# Patient Record
Sex: FEMALE | Race: WHITE | NOT HISPANIC OR LATINO | Employment: OTHER | URBAN - NONMETROPOLITAN AREA
[De-identification: names, ages, dates, MRNs, and addresses within clinical notes are randomized per-mention and may not be internally consistent; named-entity substitution may affect disease eponyms.]

---

## 2017-12-07 ENCOUNTER — APPOINTMENT (OUTPATIENT)
Dept: GENERAL RADIOLOGY | Facility: HOSPITAL | Age: 57
End: 2017-12-07

## 2017-12-07 ENCOUNTER — HOSPITAL ENCOUNTER (EMERGENCY)
Facility: HOSPITAL | Age: 57
Discharge: HOME | End: 2017-12-07
Attending: EMERGENCY MEDICINE | Admitting: EMERGENCY MEDICINE

## 2017-12-07 VITALS
WEIGHT: 150 LBS | SYSTOLIC BLOOD PRESSURE: 129 MMHG | TEMPERATURE: 97.8 F | DIASTOLIC BLOOD PRESSURE: 80 MMHG | OXYGEN SATURATION: 100 % | RESPIRATION RATE: 18 BRPM | HEART RATE: 91 BPM | HEIGHT: 65 IN | BODY MASS INDEX: 24.99 KG/M2

## 2017-12-07 DIAGNOSIS — S93.402A SPRAIN OF LEFT ANKLE, UNSPECIFIED LIGAMENT, INITIAL ENCOUNTER: Primary | ICD-10-CM

## 2017-12-07 DIAGNOSIS — S46.911A STRAIN OF RIGHT UPPER ARM, INITIAL ENCOUNTER: ICD-10-CM

## 2017-12-07 PROCEDURE — 73060 X-RAY EXAM OF HUMERUS: CPT

## 2017-12-07 PROCEDURE — 73060 X-RAY EXAM OF HUMERUS: CPT | Performed by: RADIOLOGY

## 2017-12-07 PROCEDURE — 99283 EMERGENCY DEPT VISIT LOW MDM: CPT

## 2017-12-07 PROCEDURE — 25010000002 KETOROLAC TROMETHAMINE PER 15 MG: Performed by: EMERGENCY MEDICINE

## 2017-12-07 PROCEDURE — 73090 X-RAY EXAM OF FOREARM: CPT

## 2017-12-07 PROCEDURE — 73090 X-RAY EXAM OF FOREARM: CPT | Performed by: RADIOLOGY

## 2017-12-07 PROCEDURE — 96372 THER/PROPH/DIAG INJ SC/IM: CPT

## 2017-12-07 PROCEDURE — 73610 X-RAY EXAM OF ANKLE: CPT | Performed by: RADIOLOGY

## 2017-12-07 PROCEDURE — 73610 X-RAY EXAM OF ANKLE: CPT

## 2017-12-07 RX ORDER — HYDROCODONE BITARTRATE AND ACETAMINOPHEN 5; 325 MG/1; MG/1
1 TABLET ORAL ONCE
Status: COMPLETED | OUTPATIENT
Start: 2017-12-07 | End: 2017-12-07

## 2017-12-07 RX ORDER — KETOROLAC TROMETHAMINE 30 MG/ML
30 INJECTION, SOLUTION INTRAMUSCULAR; INTRAVENOUS ONCE
Status: COMPLETED | OUTPATIENT
Start: 2017-12-07 | End: 2017-12-07

## 2017-12-07 RX ADMIN — HYDROCODONE BITARTRATE AND ACETAMINOPHEN 1 TABLET: 5; 325 TABLET ORAL at 22:52

## 2017-12-07 RX ADMIN — KETOROLAC TROMETHAMINE 30 MG: 30 INJECTION, SOLUTION INTRAMUSCULAR at 22:56

## 2017-12-08 NOTE — ED PROVIDER NOTES
Subjective   Patient is a 57 y.o. female presenting with lower extremity pain and upper extremity pain.   History provided by:  Patient   used: No    Lower Extremity Issue   Location:  Ankle  Ankle location:  L ankle  Associated symptoms: no fever    Upper Extremity Issue   Location:  Elbow  Elbow location:  R elbow  Injury: yes    Mechanism of injury: fall    Fall:     Fall occurred:  Walking (stepped off a sidewalk)    Impact surface:  [place]    Point of impact:  Unable to specify    Entrapped after fall: no    Pain details:     Quality:  Throbbing    Radiates to:  Does not radiate    Severity:  Moderate    Duration:  1 hour    Timing:  Constant    Progression:  Unchanged  Handedness:  Right-handed  Dislocation: no    Foreign body present:  No foreign bodies  Prior injury to area:  No  Relieved by:  Nothing  Worsened by:  Bearing weight and movement  Ineffective treatments:  None tried  Associated symptoms: decreased range of motion and swelling    Associated symptoms: no fever    Risk factors: no concern for non-accidental trauma and no frequent fractures        Review of Systems   Constitutional: Negative.  Negative for fever.   HENT: Negative.    Respiratory: Negative.    Cardiovascular: Negative.  Negative for chest pain.   Gastrointestinal: Negative.  Negative for abdominal pain.   Endocrine: Negative.    Genitourinary: Negative.  Negative for dysuria.   Musculoskeletal:        (+) left ankle pain, right elbow pain   Skin: Negative.    Neurological: Negative.    Psychiatric/Behavioral: Negative.    All other systems reviewed and are negative.      History reviewed. No pertinent past medical history.    No Known Allergies    Past Surgical History:   Procedure Laterality Date   • BLADDER REPAIR     • CHOLECYSTECTOMY     • GASTRIC BYPASS     • HYSTERECTOMY     • LASIK     • LUMBAR DISC SURGERY      L5 fusion       History reviewed. No pertinent family history.    Social History     Social  History   • Marital status:      Spouse name: N/A   • Number of children: N/A   • Years of education: N/A     Social History Main Topics   • Smoking status: Never Smoker   • Smokeless tobacco: Never Used   • Alcohol use No   • Drug use: No   • Sexual activity: Defer     Other Topics Concern   • None     Social History Narrative   • None           Objective   Physical Exam   Constitutional: She is oriented to person, place, and time. She appears well-developed and well-nourished. No distress.   HENT:   Head: Normocephalic and atraumatic.   Right Ear: External ear normal.   Left Ear: External ear normal.   Nose: Nose normal.   Eyes: Conjunctivae and EOM are normal. Pupils are equal, round, and reactive to light.   Neck: Normal range of motion. Neck supple. No JVD present. No tracheal deviation present.   Cardiovascular: Normal rate, regular rhythm and normal heart sounds.    No murmur heard.  Pulmonary/Chest: Effort normal and breath sounds normal. No respiratory distress. She has no wheezes.   Abdominal: Soft. Bowel sounds are normal. There is no tenderness.   Musculoskeletal: She exhibits no edema or deformity.        Right elbow: She exhibits decreased range of motion and swelling. Tenderness found.        Left ankle: She exhibits decreased range of motion and swelling. Tenderness. Lateral malleolus tenderness found.   Neurological: She is alert and oriented to person, place, and time. No cranial nerve deficit.   Skin: Skin is warm and dry. No rash noted. She is not diaphoretic. No erythema. No pallor.   Psychiatric: She has a normal mood and affect. Her behavior is normal. Thought content normal.   Nursing note and vitals reviewed.      Splint - Cast - Strapping  Date/Time: 12/7/2017 10:55 PM  Performed by: KALLIE SILVA  Authorized by: JUAN TOM     Consent:     Consent obtained:  Verbal    Consent given by:  Patient    Risks discussed:  Pain    Alternatives discussed:  Delayed treatment,  observation and referral  Universal protocol:     Procedure explained and questions answered to patient or proxy's satisfaction: yes      Relevant documents present and verified: yes      Test results available and properly labeled: yes      Imaging studies available: yes      Required blood products, implants, devices, and special equipment available: yes      Site/side marked: yes      Immediately prior to procedure a time out was called: yes      Patient identity confirmed:  Verbally with patient, arm band, provided demographic data and hospital-assigned identification number  Pre-procedure details:     Sensation:  Normal    Skin color:  Pink  Procedure details:     Laterality:  Right    Location:  Elbow    Elbow:  R elbow    Strapping: no      Supplies:  Elastic bandage  Post-procedure details:     Pain:  Unchanged    Sensation:  Normal    Skin color:  Pink    Patient tolerance of procedure:  Tolerated well, no immediate complications  Comments:      Tech applied wrap in my presence. Pt tolerated procedure well. No acute complications. Neurovascularly intact.  Splint - Cast - Strapping  Date/Time: 12/7/2017 10:59 PM  Performed by: KALLIE SILVA  Authorized by: JUAN TOM     Consent:     Consent obtained:  Verbal    Consent given by:  Patient    Risks discussed:  Pain and swelling    Alternatives discussed:  No treatment, alternative treatment and referral  Universal protocol:     Procedure explained and questions answered to patient or proxy's satisfaction: yes      Relevant documents present and verified: yes      Test results available and properly labeled: yes      Imaging studies available: yes      Required blood products, implants, devices, and special equipment available: yes      Site/side marked: yes      Immediately prior to procedure a time out was called: yes      Patient identity confirmed:  Verbally with patient, arm band, provided demographic data and hospital-assigned  identification number  Pre-procedure details:     Sensation:  Normal    Skin color:  Pink  Procedure details:     Laterality:  Left    Location:  Ankle    Ankle:  L ankle    Strapping: no      Splint type: walking boot.  Post-procedure details:     Pain:  Unchanged    Sensation:  Normal    Skin color:  Pink    Patient tolerance of procedure:  Tolerated well, no immediate complications  Comments:      Tech applied walking boot in my presence. No acute complications. Tolerated application well. Neurovascularly intact.             ED Course  ED Course                  MDM  Number of Diagnoses or Management Options  new and requires workup  new and requires workup     Amount and/or Complexity of Data Reviewed  Tests in the radiology section of CPT®: ordered and reviewed  Discuss the patient with other providers: yes    Risk of Complications, Morbidity, and/or Mortality  Presenting problems: moderate  Diagnostic procedures: moderate  Management options: moderate    Patient Progress  Patient progress: stable      Final diagnoses:   Sprain of left ankle, unspecified ligament, initial encounter   Strain of right upper arm, initial encounter            Federico Gramajo PA-C  12/08/17 0239

## 2017-12-08 NOTE — ED NOTES
Pt states that she was walking down a sidewalk when she stepped off of it by accident, states that she fell and twisted her left ankle, hurt her right elbow/forearm, states that she also hit the right side of her face. Pt denies loss of consciousness. Pt has swelling and tenderness noted to left ankle. No swelling noted to arm, pt is guarding right arm. Pt has no bruising or swelling noted to face     Karin Sutherland RN  12/07/17 8001

## 2017-12-08 NOTE — ED NOTES
Pt has ace wrap in place to right arm, states her arm feels better now. Pt has walking boot in place to left foot, pt states her pain is starting to improve. Neurovascular status remains in tact. NAD noted     Karin Sutherland RN  12/08/17 [id number]

## 2019-03-04 ENCOUNTER — HOSPITAL ENCOUNTER (EMERGENCY)
Facility: HOSPITAL | Age: 59
Discharge: HOME | End: 2019-03-04
Attending: EMERGENCY MEDICINE | Admitting: EMERGENCY MEDICINE

## 2019-03-04 ENCOUNTER — APPOINTMENT (OUTPATIENT)
Dept: GENERAL RADIOLOGY | Facility: HOSPITAL | Age: 59
End: 2019-03-04

## 2019-03-04 VITALS
RESPIRATION RATE: 20 BRPM | TEMPERATURE: 97 F | DIASTOLIC BLOOD PRESSURE: 75 MMHG | WEIGHT: 160 LBS | HEART RATE: 87 BPM | OXYGEN SATURATION: 100 % | HEIGHT: 65 IN | SYSTOLIC BLOOD PRESSURE: 156 MMHG | BODY MASS INDEX: 26.66 KG/M2

## 2019-03-04 DIAGNOSIS — S42.001A CLOSED NONDISPLACED FRACTURE OF RIGHT CLAVICLE, UNSPECIFIED PART OF CLAVICLE, INITIAL ENCOUNTER: Primary | ICD-10-CM

## 2019-03-04 PROCEDURE — 73060 X-RAY EXAM OF HUMERUS: CPT | Performed by: RADIOLOGY

## 2019-03-04 PROCEDURE — 73060 X-RAY EXAM OF HUMERUS: CPT

## 2019-03-04 PROCEDURE — 73000 X-RAY EXAM OF COLLAR BONE: CPT | Performed by: RADIOLOGY

## 2019-03-04 PROCEDURE — 73030 X-RAY EXAM OF SHOULDER: CPT | Performed by: RADIOLOGY

## 2019-03-04 PROCEDURE — 73030 X-RAY EXAM OF SHOULDER: CPT

## 2019-03-04 PROCEDURE — 99283 EMERGENCY DEPT VISIT LOW MDM: CPT

## 2019-03-04 PROCEDURE — 73000 X-RAY EXAM OF COLLAR BONE: CPT

## 2019-03-04 RX ORDER — HYDROCODONE BITARTRATE AND ACETAMINOPHEN 5; 325 MG/1; MG/1
1 TABLET ORAL EVERY 4 HOURS PRN
Qty: 12 TABLET | Refills: 0 | Status: ON HOLD | OUTPATIENT
Start: 2019-03-04 | End: 2020-08-30

## 2019-03-04 NOTE — ED NOTES
Orange fall bracelet placed on pt and education given, pt verb. understanding     Estephanie Kirkpatrick, TONG  03/04/19 8192

## 2019-03-04 NOTE — ED PROVIDER NOTES
Subjective   This is a 58-year-old female who comes in with chief complaint fall times 1 day ago.  Patient states that she tripped falling on bedroom floor injuring her right shoulder. Patient states that she had a mix drink, got up to use the bathroom when fell.  Patient denies any head injury loss conscious.  Patient only complaint is right shoulder and clavicle pain.         History provided by:  Patient   used: No    Fall   Mechanism of injury: fall    Time since incident:  1 day  Arrived directly from scene: no    Fall:     Fall occurred:  Tripped and walking    Impact surface:  Hard floor    Entrapped after fall: no    Suspicion of alcohol use: no    Suspicion of drug use: no    Tetanus status:  Unknown  Prior to arrival data:     Bystander interventions:  None    Patient ambulatory at scene: no      Blood loss:  None    Orientation at scene:  Person, place, time and situation    Loss of consciousness: no      Amnesic to event: no      Airway interventions:  None    Breathing interventions:  None    IV access status:  None    IO access:  None    Fluids administered:  None    Cardiac interventions:  None    Medications administered:  None    Immobilization:  None  Associated symptoms: no abdominal pain, no back pain, no difficulty breathing, no headaches, no hearing loss, no nausea and no neck pain    Risk factors: no AICD, no beta blocker therapy, no CABG, no COPD, no dialysis, no kidney disease, no pacemaker and not pregnant        Review of Systems   Constitutional: Negative.    HENT: Negative for hearing loss.    Eyes: Negative.    Gastrointestinal: Negative for abdominal pain and nausea.   Musculoskeletal: Positive for arthralgias, joint swelling and myalgias. Negative for back pain and neck pain.   Skin: Negative.  Negative for color change, pallor and wound.   Neurological: Negative for headaches.   All other systems reviewed and are negative.      No past medical history on  file.    No Known Allergies    Past Surgical History:   Procedure Laterality Date   • BLADDER REPAIR     • CHOLECYSTECTOMY     • GASTRIC BYPASS     • HYSTERECTOMY     • LASIK     • LUMBAR DISC SURGERY      L5 fusion       No family history on file.    Social History     Socioeconomic History   • Marital status:      Spouse name: Not on file   • Number of children: Not on file   • Years of education: Not on file   • Highest education level: Not on file   Tobacco Use   • Smoking status: Never Smoker   • Smokeless tobacco: Never Used   Substance and Sexual Activity   • Alcohol use: No   • Drug use: No   • Sexual activity: Defer           Objective   Physical Exam   Constitutional: She is oriented to person, place, and time. She appears well-developed and well-nourished. No distress.   HENT:   Head: Normocephalic.   Right Ear: External ear normal.   Left Ear: External ear normal.   Nose: Nose normal.   Mouth/Throat: Oropharynx is clear and moist. No oropharyngeal exudate.   Eyes: Conjunctivae and EOM are normal. Pupils are equal, round, and reactive to light. Right eye exhibits no discharge. Left eye exhibits no discharge. No scleral icterus.   Neck: Normal range of motion. Neck supple. No JVD present. No tracheal deviation present. No thyromegaly present.   Cardiovascular: Normal rate, regular rhythm, normal heart sounds and intact distal pulses. Exam reveals no gallop and no friction rub.   No murmur heard.  Pulmonary/Chest: Effort normal and breath sounds normal. No stridor. No respiratory distress. She has no wheezes. She has no rales.   Abdominal: Soft. Bowel sounds are normal. She exhibits no distension and no mass. There is no tenderness. There is no rebound and no guarding. No hernia.   Musculoskeletal: She exhibits edema and tenderness.        Right shoulder: She exhibits decreased range of motion, tenderness, swelling, pain and spasm.   Neurological: She is alert and oriented to person, place, and time.  She displays normal reflexes. No cranial nerve deficit. She exhibits normal muscle tone. Coordination normal.   Skin: Skin is warm and dry. Capillary refill takes less than 2 seconds. No rash noted. She is not diaphoretic. No erythema. No pallor.   Psychiatric: She has a normal mood and affect. Her behavior is normal. Judgment and thought content normal.   Nursing note and vitals reviewed.      Splint - Cast - Strapping  Date/Time: 3/4/2019 1:59 PM  Performed by: Preston Escoto PA-C  Authorized by: Khurram Bauer MD     Consent:     Consent obtained:  Verbal    Consent given by:  Patient    Risks discussed:  Discoloration    Alternatives discussed:  No treatment  Pre-procedure details:     Sensation:  Normal    Skin color:  Pink   Procedure details:     Laterality:  Right    Location:  Shoulder    Shoulder:  R shoulder    Strapping: no      Cast type:  Long arm    Supplies:  Prefabricated splint and sling  Post-procedure details:     Pain:  Improved    Sensation:  Normal    Skin color:  Pink     Patient tolerance of procedure:  Tolerated well, no immediate complications  Comments:      N/V intact                ED Course  ED Course as of Mar 04 1402   Mon Mar 04, 2019   1348 Distal right clavicle fracture.  []   1402 Ronaldo obtained   []      ED Course User Index  [] Preston Escoto PA-C                  [facility]      Final diagnoses:   Closed nondisplaced fracture of right clavicle, unspecified part of clavicle, initial encounter            Preston Escoto PA-C  03/04/19 1402

## 2020-08-30 ENCOUNTER — APPOINTMENT (OUTPATIENT)
Dept: CT IMAGING | Facility: HOSPITAL | Age: 60
End: 2020-08-30

## 2020-08-30 ENCOUNTER — APPOINTMENT (OUTPATIENT)
Dept: GENERAL RADIOLOGY | Facility: HOSPITAL | Age: 60
End: 2020-08-30

## 2020-08-30 ENCOUNTER — ANESTHESIA EVENT (OUTPATIENT)
Dept: PERIOP | Facility: HOSPITAL | Age: 60
End: 2020-08-30

## 2020-08-30 ENCOUNTER — HOSPITAL ENCOUNTER (INPATIENT)
Facility: HOSPITAL | Age: 60
LOS: 1 days | Discharge: HOME | End: 2020-08-31
Attending: FAMILY MEDICINE | Admitting: INTERNAL MEDICINE

## 2020-08-30 ENCOUNTER — ANESTHESIA (OUTPATIENT)
Dept: PERIOP | Facility: HOSPITAL | Age: 60
End: 2020-08-30

## 2020-08-30 DIAGNOSIS — N30.00 ACUTE CYSTITIS WITHOUT HEMATURIA: ICD-10-CM

## 2020-08-30 DIAGNOSIS — S42.211A CLOSED DISPLACED FRACTURE OF SURGICAL NECK OF RIGHT HUMERUS, UNSPECIFIED FRACTURE MORPHOLOGY, INITIAL ENCOUNTER: Primary | ICD-10-CM

## 2020-08-30 LAB
6-ACETYL MORPHINE: NEGATIVE
ABO GROUP BLD: NORMAL
ALBUMIN SERPL-MCNC: 4.17 G/DL (ref 3.5–5.2)
ALBUMIN/GLOB SERPL: 1.4 G/DL
ALP SERPL-CCNC: 87 U/L (ref 39–117)
ALT SERPL W P-5'-P-CCNC: 20 U/L (ref 1–33)
AMPHET+METHAMPHET UR QL: NEGATIVE
ANION GAP SERPL CALCULATED.3IONS-SCNC: 16.6 MMOL/L (ref 5–15)
APTT PPP: 26 SECONDS (ref 25.6–35.3)
AST SERPL-CCNC: 29 U/L (ref 1–32)
BACTERIA UR QL AUTO: ABNORMAL /HPF
BARBITURATES UR QL SCN: NEGATIVE
BASOPHILS # BLD AUTO: 0.04 10*3/MM3 (ref 0–0.2)
BASOPHILS NFR BLD AUTO: 0.4 % (ref 0–1.5)
BENZODIAZ UR QL SCN: NEGATIVE
BILIRUB SERPL-MCNC: 0.2 MG/DL (ref 0–1.2)
BILIRUB UR QL STRIP: NEGATIVE
BLD GP AB SCN SERPL QL: NEGATIVE
BUN SERPL-MCNC: 8 MG/DL (ref 6–20)
BUN/CREAT SERPL: 11.8 (ref 7–25)
BUPRENORPHINE SERPL-MCNC: NEGATIVE NG/ML
CALCIUM SPEC-SCNC: 8.9 MG/DL (ref 8.6–10.5)
CANNABINOIDS SERPL QL: NEGATIVE
CHLORIDE SERPL-SCNC: 102 MMOL/L (ref 98–107)
CLARITY UR: CLEAR
CO2 SERPL-SCNC: 19.4 MMOL/L (ref 22–29)
COCAINE UR QL: NEGATIVE
COLOR UR: YELLOW
CREAT SERPL-MCNC: 0.68 MG/DL (ref 0.57–1)
DEPRECATED RDW RBC AUTO: 53.4 FL (ref 37–54)
EOSINOPHIL # BLD AUTO: 0.02 10*3/MM3 (ref 0–0.4)
EOSINOPHIL NFR BLD AUTO: 0.2 % (ref 0.3–6.2)
ERYTHROCYTE [DISTWIDTH] IN BLOOD BY AUTOMATED COUNT: 18.4 % (ref 12.3–15.4)
ETHANOL BLD-MCNC: 194 MG/DL (ref 0–10)
ETHANOL UR QL: 0.19 %
GFR SERPL CREATININE-BSD FRML MDRD: 89 ML/MIN/1.73
GLOBULIN UR ELPH-MCNC: 3 GM/DL
GLUCOSE SERPL-MCNC: 103 MG/DL (ref 65–99)
GLUCOSE UR STRIP-MCNC: NEGATIVE MG/DL
HCT VFR BLD AUTO: 36.5 % (ref 34–46.6)
HGB BLD-MCNC: 11 G/DL (ref 12–15.9)
HGB UR QL STRIP.AUTO: NEGATIVE
HYALINE CASTS UR QL AUTO: ABNORMAL /LPF
IMM GRANULOCYTES # BLD AUTO: 0.05 10*3/MM3 (ref 0–0.05)
IMM GRANULOCYTES NFR BLD AUTO: 0.5 % (ref 0–0.5)
INR PPP: 0.95 (ref 0.9–1.1)
KETONES UR QL STRIP: NEGATIVE
LEUKOCYTE ESTERASE UR QL STRIP.AUTO: ABNORMAL
LYMPHOCYTES # BLD AUTO: 1.21 10*3/MM3 (ref 0.7–3.1)
LYMPHOCYTES NFR BLD AUTO: 11.9 % (ref 19.6–45.3)
MAGNESIUM SERPL-MCNC: 1.9 MG/DL (ref 1.6–2.6)
MCH RBC QN AUTO: 24.4 PG (ref 26.6–33)
MCHC RBC AUTO-ENTMCNC: 30.1 G/DL (ref 31.5–35.7)
MCV RBC AUTO: 80.9 FL (ref 79–97)
METHADONE UR QL SCN: NEGATIVE
MONOCYTES # BLD AUTO: 0.45 10*3/MM3 (ref 0.1–0.9)
MONOCYTES NFR BLD AUTO: 4.4 % (ref 5–12)
NEUTROPHILS NFR BLD AUTO: 8.36 10*3/MM3 (ref 1.7–7)
NEUTROPHILS NFR BLD AUTO: 82.6 % (ref 42.7–76)
NITRITE UR QL STRIP: NEGATIVE
NRBC BLD AUTO-RTO: 0 /100 WBC (ref 0–0.2)
OPIATES UR QL: NEGATIVE
OXYCODONE UR QL SCN: NEGATIVE
PCP UR QL SCN: NEGATIVE
PH UR STRIP.AUTO: 6 [PH] (ref 5–8)
PLATELET # BLD AUTO: 333 10*3/MM3 (ref 140–450)
PMV BLD AUTO: 9.1 FL (ref 6–12)
POTASSIUM SERPL-SCNC: 3.2 MMOL/L (ref 3.5–5.2)
PROT SERPL-MCNC: 7.2 G/DL (ref 6–8.5)
PROT UR QL STRIP: NEGATIVE
PROTHROMBIN TIME: 12.5 SECONDS (ref 11.9–14.1)
RBC # BLD AUTO: 4.51 10*6/MM3 (ref 3.77–5.28)
RBC # UR: ABNORMAL /HPF
REF LAB TEST METHOD: ABNORMAL
RH BLD: POSITIVE
SARS-COV-2 RDRP RESP QL NAA+PROBE: NOT DETECTED
SODIUM SERPL-SCNC: 138 MMOL/L (ref 136–145)
SP GR UR STRIP: <=1.005 (ref 1–1.03)
SQUAMOUS #/AREA URNS HPF: ABNORMAL /HPF
T&S EXPIRATION DATE: NORMAL
UROBILINOGEN UR QL STRIP: ABNORMAL
WBC # BLD AUTO: 10.13 10*3/MM3 (ref 3.4–10.8)
WBC UR QL AUTO: ABNORMAL /HPF

## 2020-08-30 PROCEDURE — 80307 DRUG TEST PRSMV CHEM ANLYZR: CPT | Performed by: PHYSICIAN ASSISTANT

## 2020-08-30 PROCEDURE — 85610 PROTHROMBIN TIME: CPT | Performed by: INTERNAL MEDICINE

## 2020-08-30 PROCEDURE — 25010000002 ROPIVACAINE PER 1 MG: Performed by: ANESTHESIOLOGY

## 2020-08-30 PROCEDURE — 94799 UNLISTED PULMONARY SVC/PX: CPT

## 2020-08-30 PROCEDURE — 81001 URINALYSIS AUTO W/SCOPE: CPT | Performed by: PHYSICIAN ASSISTANT

## 2020-08-30 PROCEDURE — 86901 BLOOD TYPING SEROLOGIC RH(D): CPT | Performed by: PHYSICIAN ASSISTANT

## 2020-08-30 PROCEDURE — 25010000002 CEFTRIAXONE: Performed by: PHYSICIAN ASSISTANT

## 2020-08-30 PROCEDURE — 25010000002 MIDAZOLAM PER 1 MG: Performed by: ANESTHESIOLOGY

## 2020-08-30 PROCEDURE — 86850 RBC ANTIBODY SCREEN: CPT | Performed by: PHYSICIAN ASSISTANT

## 2020-08-30 PROCEDURE — 87086 URINE CULTURE/COLONY COUNT: CPT | Performed by: PHYSICIAN ASSISTANT

## 2020-08-30 PROCEDURE — 73030 X-RAY EXAM OF SHOULDER: CPT

## 2020-08-30 PROCEDURE — 93005 ELECTROCARDIOGRAM TRACING: CPT | Performed by: PHYSICIAN ASSISTANT

## 2020-08-30 PROCEDURE — C1713 ANCHOR/SCREW BN/BN,TIS/BN: HCPCS | Performed by: ORTHOPAEDIC SURGERY

## 2020-08-30 PROCEDURE — 76000 FLUOROSCOPY <1 HR PHYS/QHP: CPT

## 2020-08-30 PROCEDURE — 25010000002 MORPHINE PER 10 MG: Performed by: FAMILY MEDICINE

## 2020-08-30 PROCEDURE — 25010000002 ONDANSETRON PER 1 MG: Performed by: NURSE ANESTHETIST, CERTIFIED REGISTERED

## 2020-08-30 PROCEDURE — 85730 THROMBOPLASTIN TIME PARTIAL: CPT | Performed by: INTERNAL MEDICINE

## 2020-08-30 PROCEDURE — 87186 SC STD MICRODIL/AGAR DIL: CPT | Performed by: PHYSICIAN ASSISTANT

## 2020-08-30 PROCEDURE — 0PSF04Z REPOSITION RIGHT HUMERAL SHAFT WITH INTERNAL FIXATION DEVICE, OPEN APPROACH: ICD-10-PCS | Performed by: ORTHOPAEDIC SURGERY

## 2020-08-30 PROCEDURE — 99223 1ST HOSP IP/OBS HIGH 75: CPT | Performed by: PHYSICIAN ASSISTANT

## 2020-08-30 PROCEDURE — 25010000002 BUPRENORPHINE PER 0.1 MG: Performed by: ANESTHESIOLOGY

## 2020-08-30 PROCEDURE — 83735 ASSAY OF MAGNESIUM: CPT | Performed by: PHYSICIAN ASSISTANT

## 2020-08-30 PROCEDURE — 25010000002 PROPOFOL 10 MG/ML EMULSION: Performed by: NURSE ANESTHETIST, CERTIFIED REGISTERED

## 2020-08-30 PROCEDURE — 25010000002 MORPHINE PER 10 MG: Performed by: ORTHOPAEDIC SURGERY

## 2020-08-30 PROCEDURE — 25010000002 FENTANYL CITRATE (PF) 100 MCG/2ML SOLUTION: Performed by: ANESTHESIOLOGY

## 2020-08-30 PROCEDURE — 76000 FLUOROSCOPY <1 HR PHYS/QHP: CPT | Performed by: RADIOLOGY

## 2020-08-30 PROCEDURE — 99284 EMERGENCY DEPT VISIT MOD MDM: CPT

## 2020-08-30 PROCEDURE — 86900 BLOOD TYPING SEROLOGIC ABO: CPT

## 2020-08-30 PROCEDURE — 87635 SARS-COV-2 COVID-19 AMP PRB: CPT | Performed by: NURSE PRACTITIONER

## 2020-08-30 PROCEDURE — 85025 COMPLETE CBC W/AUTO DIFF WBC: CPT | Performed by: PHYSICIAN ASSISTANT

## 2020-08-30 PROCEDURE — 70486 CT MAXILLOFACIAL W/O DYE: CPT

## 2020-08-30 PROCEDURE — 25010000002 MORPHINE PER 10 MG: Performed by: INTERNAL MEDICINE

## 2020-08-30 PROCEDURE — 71045 X-RAY EXAM CHEST 1 VIEW: CPT

## 2020-08-30 PROCEDURE — 80053 COMPREHEN METABOLIC PANEL: CPT | Performed by: PHYSICIAN ASSISTANT

## 2020-08-30 PROCEDURE — 73200 CT UPPER EXTREMITY W/O DYE: CPT

## 2020-08-30 PROCEDURE — 25010000002 ONDANSETRON PER 1 MG: Performed by: PHYSICIAN ASSISTANT

## 2020-08-30 PROCEDURE — 87077 CULTURE AEROBIC IDENTIFY: CPT | Performed by: PHYSICIAN ASSISTANT

## 2020-08-30 PROCEDURE — 93010 ELECTROCARDIOGRAM REPORT: CPT | Performed by: INTERNAL MEDICINE

## 2020-08-30 PROCEDURE — 73000 X-RAY EXAM OF COLLAR BONE: CPT

## 2020-08-30 PROCEDURE — 25010000002 DEXAMETHASONE PER 1 MG: Performed by: NURSE ANESTHETIST, CERTIFIED REGISTERED

## 2020-08-30 PROCEDURE — 86900 BLOOD TYPING SEROLOGIC ABO: CPT | Performed by: PHYSICIAN ASSISTANT

## 2020-08-30 PROCEDURE — 25010000002 FENTANYL CITRATE (PF) 100 MCG/2ML SOLUTION: Performed by: NURSE ANESTHETIST, CERTIFIED REGISTERED

## 2020-08-30 PROCEDURE — 86901 BLOOD TYPING SEROLOGIC RH(D): CPT

## 2020-08-30 DEVICE — SCRW LK S/TAP STRDRV 3.5X30MM
Type: IMPLANTABLE DEVICE | Site: HUMERUS | Status: NON-FUNCTIONAL
Removed: 2020-12-14

## 2020-08-30 DEVICE — SCRW CORT S/TAP 3.5X28MM: Type: IMPLANTABLE DEVICE | Site: HUMERUS | Status: NON-FUNCTIONAL

## 2020-08-30 DEVICE — GII QUICKANCHOR PLUS SIZE 2 (5 METRIC) PANACRYL BRAIDED ABSORBABLE SUTURE 36 INCHES (91CM), DOUBLE ARMED WITH CP-2 NEEDLES, WITH DISPOSABLE INSERTER.
Type: IMPLANTABLE DEVICE | Site: HUMERUS | Status: NON-FUNCTIONAL
Brand: GII QUICKANCHOR PANACRYL

## 2020-08-30 DEVICE — SCRW LK S/TAP STRDRV 3.5X42MM: Type: IMPLANTABLE DEVICE | Site: HUMERUS | Status: NON-FUNCTIONAL

## 2020-08-30 DEVICE — SCRW LK S/TAP STRDRV 3.5X48MM: Type: IMPLANTABLE DEVICE | Site: HUMERUS | Status: NON-FUNCTIONAL

## 2020-08-30 DEVICE — IMPLANTABLE DEVICE: Type: IMPLANTABLE DEVICE | Site: HUMERUS | Status: NON-FUNCTIONAL

## 2020-08-30 RX ORDER — NALOXONE HCL 0.4 MG/ML
0.4 VIAL (ML) INJECTION
Status: DISCONTINUED | OUTPATIENT
Start: 2020-08-30 | End: 2020-08-30

## 2020-08-30 RX ORDER — MIDAZOLAM HYDROCHLORIDE 1 MG/ML
1 INJECTION INTRAMUSCULAR; INTRAVENOUS
Status: DISCONTINUED | OUTPATIENT
Start: 2020-08-30 | End: 2020-08-30 | Stop reason: HOSPADM

## 2020-08-30 RX ORDER — SODIUM CHLORIDE 0.9 % (FLUSH) 0.9 %
10 SYRINGE (ML) INJECTION EVERY 12 HOURS SCHEDULED
Status: DISCONTINUED | OUTPATIENT
Start: 2020-08-30 | End: 2020-08-31 | Stop reason: HOSPADM

## 2020-08-30 RX ORDER — CEFAZOLIN SODIUM 2 G/50ML
2 SOLUTION INTRAVENOUS ONCE
Status: DISCONTINUED | OUTPATIENT
Start: 2020-08-30 | End: 2020-08-30 | Stop reason: HOSPADM

## 2020-08-30 RX ORDER — ACETAMINOPHEN 160 MG/5ML
650 SOLUTION ORAL EVERY 4 HOURS PRN
Status: DISCONTINUED | OUTPATIENT
Start: 2020-08-30 | End: 2020-08-31 | Stop reason: HOSPADM

## 2020-08-30 RX ORDER — BUPRENORPHINE HYDROCHLORIDE 0.32 MG/ML
INJECTION INTRAMUSCULAR; INTRAVENOUS AS NEEDED
Status: DISCONTINUED | OUTPATIENT
Start: 2020-08-30 | End: 2020-08-30 | Stop reason: SURG

## 2020-08-30 RX ORDER — MIDAZOLAM HYDROCHLORIDE 1 MG/ML
INJECTION INTRAMUSCULAR; INTRAVENOUS AS NEEDED
Status: DISCONTINUED | OUTPATIENT
Start: 2020-08-30 | End: 2020-08-30 | Stop reason: SURG

## 2020-08-30 RX ORDER — BUPRENORPHINE HYDROCHLORIDE 0.32 MG/ML
INJECTION INTRAMUSCULAR; INTRAVENOUS AS NEEDED
Status: DISCONTINUED | OUTPATIENT
Start: 2020-08-30 | End: 2020-08-30

## 2020-08-30 RX ORDER — HYDROCODONE BITARTRATE AND ACETAMINOPHEN 5; 325 MG/1; MG/1
1 TABLET ORAL EVERY 6 HOURS PRN
Status: DISCONTINUED | OUTPATIENT
Start: 2020-08-30 | End: 2020-08-31 | Stop reason: HOSPADM

## 2020-08-30 RX ORDER — ASPIRIN 81 MG/1
81 TABLET, CHEWABLE ORAL DAILY
COMMUNITY
End: 2023-02-03

## 2020-08-30 RX ORDER — SODIUM CHLORIDE 0.9 % (FLUSH) 0.9 %
10 SYRINGE (ML) INJECTION AS NEEDED
Status: DISCONTINUED | OUTPATIENT
Start: 2020-08-30 | End: 2020-08-31 | Stop reason: HOSPADM

## 2020-08-30 RX ORDER — ONDANSETRON 2 MG/ML
4 INJECTION INTRAMUSCULAR; INTRAVENOUS ONCE
Status: COMPLETED | OUTPATIENT
Start: 2020-08-30 | End: 2020-08-30

## 2020-08-30 RX ORDER — CHOLECALCIFEROL (VITAMIN D3) 125 MCG
5 CAPSULE ORAL NIGHTLY PRN
Status: DISCONTINUED | OUTPATIENT
Start: 2020-08-30 | End: 2020-08-31 | Stop reason: HOSPADM

## 2020-08-30 RX ORDER — SODIUM CHLORIDE 9 MG/ML
100 INJECTION, SOLUTION INTRAVENOUS CONTINUOUS
Status: DISCONTINUED | OUTPATIENT
Start: 2020-08-30 | End: 2020-08-31

## 2020-08-30 RX ORDER — SODIUM CHLORIDE 0.9 % (FLUSH) 0.9 %
10 SYRINGE (ML) INJECTION EVERY 12 HOURS SCHEDULED
Status: DISCONTINUED | OUTPATIENT
Start: 2020-08-30 | End: 2020-08-30 | Stop reason: HOSPADM

## 2020-08-30 RX ORDER — SODIUM CHLORIDE, SODIUM LACTATE, POTASSIUM CHLORIDE, CALCIUM CHLORIDE 600; 310; 30; 20 MG/100ML; MG/100ML; MG/100ML; MG/100ML
125 INJECTION, SOLUTION INTRAVENOUS CONTINUOUS
Status: DISCONTINUED | OUTPATIENT
Start: 2020-08-30 | End: 2020-08-31 | Stop reason: HOSPADM

## 2020-08-30 RX ORDER — ACETAMINOPHEN 650 MG/1
650 SUPPOSITORY RECTAL EVERY 4 HOURS PRN
Status: DISCONTINUED | OUTPATIENT
Start: 2020-08-30 | End: 2020-08-31 | Stop reason: HOSPADM

## 2020-08-30 RX ORDER — ASPIRIN 81 MG/1
81 TABLET, CHEWABLE ORAL DAILY
Status: CANCELLED | OUTPATIENT
Start: 2020-08-30

## 2020-08-30 RX ORDER — ROPIVACAINE HYDROCHLORIDE 5 MG/ML
INJECTION, SOLUTION EPIDURAL; INFILTRATION; PERINEURAL
Status: COMPLETED | OUTPATIENT
Start: 2020-08-30 | End: 2020-08-30

## 2020-08-30 RX ORDER — FENTANYL CITRATE 50 UG/ML
50 INJECTION, SOLUTION INTRAMUSCULAR; INTRAVENOUS
Status: COMPLETED | OUTPATIENT
Start: 2020-08-30 | End: 2020-08-30

## 2020-08-30 RX ORDER — SODIUM CHLORIDE 0.9 % (FLUSH) 0.9 %
10 SYRINGE (ML) INJECTION AS NEEDED
Status: DISCONTINUED | OUTPATIENT
Start: 2020-08-30 | End: 2020-08-30 | Stop reason: HOSPADM

## 2020-08-30 RX ORDER — FENTANYL CITRATE 50 UG/ML
INJECTION, SOLUTION INTRAMUSCULAR; INTRAVENOUS AS NEEDED
Status: DISCONTINUED | OUTPATIENT
Start: 2020-08-30 | End: 2020-08-30 | Stop reason: SURG

## 2020-08-30 RX ORDER — ONDANSETRON 2 MG/ML
4 INJECTION INTRAMUSCULAR; INTRAVENOUS EVERY 6 HOURS PRN
Status: DISCONTINUED | OUTPATIENT
Start: 2020-08-30 | End: 2020-08-31 | Stop reason: HOSPADM

## 2020-08-30 RX ORDER — OXYCODONE HYDROCHLORIDE AND ACETAMINOPHEN 5; 325 MG/1; MG/1
1 TABLET ORAL ONCE AS NEEDED
Status: DISCONTINUED | OUTPATIENT
Start: 2020-08-30 | End: 2020-08-30 | Stop reason: HOSPADM

## 2020-08-30 RX ORDER — PROPOFOL 10 MG/ML
VIAL (ML) INTRAVENOUS AS NEEDED
Status: DISCONTINUED | OUTPATIENT
Start: 2020-08-30 | End: 2020-08-30 | Stop reason: SURG

## 2020-08-30 RX ORDER — ONDANSETRON 2 MG/ML
4 INJECTION INTRAMUSCULAR; INTRAVENOUS AS NEEDED
Status: DISCONTINUED | OUTPATIENT
Start: 2020-08-30 | End: 2020-08-30 | Stop reason: HOSPADM

## 2020-08-30 RX ORDER — IPRATROPIUM BROMIDE AND ALBUTEROL SULFATE 2.5; .5 MG/3ML; MG/3ML
3 SOLUTION RESPIRATORY (INHALATION) ONCE AS NEEDED
Status: DISCONTINUED | OUTPATIENT
Start: 2020-08-30 | End: 2020-08-30 | Stop reason: HOSPADM

## 2020-08-30 RX ORDER — NALOXONE HCL 0.4 MG/ML
0.4 VIAL (ML) INJECTION
Status: DISCONTINUED | OUTPATIENT
Start: 2020-08-30 | End: 2020-08-31 | Stop reason: HOSPADM

## 2020-08-30 RX ORDER — POTASSIUM CHLORIDE 20MEQ/15ML
40 LIQUID (ML) ORAL ONCE
Status: DISCONTINUED | OUTPATIENT
Start: 2020-08-30 | End: 2020-08-31 | Stop reason: HOSPADM

## 2020-08-30 RX ORDER — CEFAZOLIN SODIUM 2 G/50ML
2 SOLUTION INTRAVENOUS EVERY 8 HOURS
Status: DISCONTINUED | OUTPATIENT
Start: 2020-08-31 | End: 2020-08-31 | Stop reason: HOSPADM

## 2020-08-30 RX ORDER — ACETAMINOPHEN 325 MG/1
650 TABLET ORAL EVERY 4 HOURS PRN
Status: DISCONTINUED | OUTPATIENT
Start: 2020-08-30 | End: 2020-08-31 | Stop reason: HOSPADM

## 2020-08-30 RX ORDER — AMOXICILLIN 250 MG
2 CAPSULE ORAL 2 TIMES DAILY
Status: DISCONTINUED | OUTPATIENT
Start: 2020-08-30 | End: 2020-08-31 | Stop reason: HOSPADM

## 2020-08-30 RX ORDER — DEXAMETHASONE SODIUM PHOSPHATE 4 MG/ML
INJECTION, SOLUTION INTRA-ARTICULAR; INTRALESIONAL; INTRAMUSCULAR; INTRAVENOUS; SOFT TISSUE AS NEEDED
Status: DISCONTINUED | OUTPATIENT
Start: 2020-08-30 | End: 2020-08-30 | Stop reason: SURG

## 2020-08-30 RX ORDER — ROCURONIUM BROMIDE 10 MG/ML
INJECTION, SOLUTION INTRAVENOUS AS NEEDED
Status: DISCONTINUED | OUTPATIENT
Start: 2020-08-30 | End: 2020-08-30 | Stop reason: SURG

## 2020-08-30 RX ORDER — MORPHINE SULFATE 2 MG/ML
1 INJECTION, SOLUTION INTRAMUSCULAR; INTRAVENOUS EVERY 4 HOURS PRN
Status: DISCONTINUED | OUTPATIENT
Start: 2020-08-30 | End: 2020-08-30

## 2020-08-30 RX ORDER — ONDANSETRON 2 MG/ML
INJECTION INTRAMUSCULAR; INTRAVENOUS AS NEEDED
Status: DISCONTINUED | OUTPATIENT
Start: 2020-08-30 | End: 2020-08-30 | Stop reason: SURG

## 2020-08-30 RX ORDER — SODIUM CHLORIDE, SODIUM LACTATE, POTASSIUM CHLORIDE, CALCIUM CHLORIDE 600; 310; 30; 20 MG/100ML; MG/100ML; MG/100ML; MG/100ML
INJECTION, SOLUTION INTRAVENOUS CONTINUOUS PRN
Status: DISCONTINUED | OUTPATIENT
Start: 2020-08-30 | End: 2020-08-30 | Stop reason: SURG

## 2020-08-30 RX ADMIN — FENTANYL CITRATE 50 MCG: 50 INJECTION INTRAMUSCULAR; INTRAVENOUS at 18:58

## 2020-08-30 RX ADMIN — ONDANSETRON 4 MG: 2 INJECTION INTRAMUSCULAR; INTRAVENOUS at 18:10

## 2020-08-30 RX ADMIN — FENTANYL CITRATE 100 MCG: 50 INJECTION INTRAMUSCULAR; INTRAVENOUS at 18:07

## 2020-08-30 RX ADMIN — FENTANYL CITRATE 100 MCG: 50 INJECTION INTRAMUSCULAR; INTRAVENOUS at 15:10

## 2020-08-30 RX ADMIN — CEFAZOLIN 2 G: 1 INJECTION, POWDER, FOR SOLUTION INTRAMUSCULAR; INTRAVENOUS; PARENTERAL at 16:17

## 2020-08-30 RX ADMIN — ROCURONIUM BROMIDE 20 MG: 10 SOLUTION INTRAVENOUS at 16:20

## 2020-08-30 RX ADMIN — SODIUM CHLORIDE, POTASSIUM CHLORIDE, SODIUM LACTATE AND CALCIUM CHLORIDE: 600; 310; 30; 20 INJECTION, SOLUTION INTRAVENOUS at 15:59

## 2020-08-30 RX ADMIN — FENTANYL CITRATE 50 MCG: 50 INJECTION INTRAMUSCULAR; INTRAVENOUS at 19:03

## 2020-08-30 RX ADMIN — DOCUSATE SODIUM 50 MG AND SENNOSIDES 8.6 MG 2 TABLET: 8.6; 5 TABLET, FILM COATED ORAL at 20:29

## 2020-08-30 RX ADMIN — DEXAMETHASONE SODIUM PHOSPHATE 4 MG: 4 INJECTION, SOLUTION INTRAMUSCULAR; INTRAVENOUS at 18:10

## 2020-08-30 RX ADMIN — SODIUM CHLORIDE 100 ML/HR: 9 INJECTION, SOLUTION INTRAVENOUS at 12:08

## 2020-08-30 RX ADMIN — SODIUM CHLORIDE, PRESERVATIVE FREE 10 ML: 5 INJECTION INTRAVENOUS at 20:29

## 2020-08-30 RX ADMIN — MORPHINE SULFATE 4 MG: 4 INJECTION, SOLUTION INTRAMUSCULAR; INTRAVENOUS at 13:25

## 2020-08-30 RX ADMIN — ONDANSETRON 4 MG: 2 INJECTION INTRAMUSCULAR; INTRAVENOUS at 05:55

## 2020-08-30 RX ADMIN — CEFTRIAXONE 1 G: 1 INJECTION, POWDER, FOR SOLUTION INTRAMUSCULAR; INTRAVENOUS at 07:12

## 2020-08-30 RX ADMIN — MORPHINE SULFATE 1 MG: 2 INJECTION, SOLUTION INTRAMUSCULAR; INTRAVENOUS at 09:40

## 2020-08-30 RX ADMIN — HYDROCODONE BITARTRATE AND ACETAMINOPHEN 1 TABLET: 5; 325 TABLET ORAL at 10:07

## 2020-08-30 RX ADMIN — ROPIVACAINE HYDROCHLORIDE 15 MG: 5 INJECTION, SOLUTION EPIDURAL; INFILTRATION; PERINEURAL at 15:10

## 2020-08-30 RX ADMIN — MORPHINE SULFATE 4 MG: 4 INJECTION, SOLUTION INTRAMUSCULAR; INTRAVENOUS at 05:55

## 2020-08-30 RX ADMIN — BUPRENORPHINE HYDROCHLORIDE 0.3 MCG: 0.32 INJECTION INTRAMUSCULAR; INTRAVENOUS at 17:10

## 2020-08-30 RX ADMIN — SODIUM CHLORIDE, POTASSIUM CHLORIDE, SODIUM LACTATE AND CALCIUM CHLORIDE 125 ML/HR: 600; 310; 30; 20 INJECTION, SOLUTION INTRAVENOUS at 20:33

## 2020-08-30 RX ADMIN — MIDAZOLAM HYDROCHLORIDE 2 MG: 1 INJECTION, SOLUTION INTRAMUSCULAR; INTRAVENOUS at 15:10

## 2020-08-30 RX ADMIN — MORPHINE SULFATE 4 MG: 4 INJECTION, SOLUTION INTRAMUSCULAR; INTRAVENOUS at 20:29

## 2020-08-30 RX ADMIN — PROPOFOL 140 MG: 10 INJECTION, EMULSION INTRAVENOUS at 16:20

## 2020-08-30 NOTE — ANESTHESIA PROCEDURE NOTES
Airway  Urgency: elective    Date/Time: 8/30/2020 4:21 PM  End Time:8/30/2020 4:21 PM  Airway not difficult    General Information and Staff    Patient location during procedure: OR  CRNA: Chandler Hoffman CRNA    Indications and Patient Condition  Indications for airway management: airway protection    Preoxygenated: yes  MILS maintained throughout  Mask difficulty assessment: 0 - not attempted    Final Airway Details  Final airway type: endotracheal airway      Successful airway: ETT  Cuffed: yes   Successful intubation technique: direct laryngoscopy  Endotracheal tube insertion site: oral  Blade: Albino  Blade size: 3  ETT size (mm): 7.0  Cormack-Lehane Classification: grade IIa - partial view of glottis  Placement verified by: chest auscultation, capnometry and palpation of cuff   Cuff volume (mL): 8  Measured from: lips  ETT/EBT  to lips (cm): 22  Number of attempts at approach: 1  Assessment: lips, teeth, and gum same as pre-op and atraumatic intubation

## 2020-08-30 NOTE — ANESTHESIA PROCEDURE NOTES
Peripheral Block    Pre-sedation assessment completed: 8/30/2020 3:10 PM    Patient location during procedure: holding area  Start time: 8/30/2020 3:10 PM  Stop time: 8/30/2020 3:16 PM  Reason for block: post-op pain management  Performed by  Anesthesiologist: Isaac Murry MD  Preanesthetic Checklist  Completed: patient identified, site marked, surgical consent, pre-op evaluation, timeout performed, IV checked, risks and benefits discussed and monitors and equipment checked  Prep:  Pt Position: supine  Sterile barriers:cap, gloves and sterile barriers  Prep: ChloraPrep  Patient monitoring: blood pressure monitoring, continuous pulse oximetry and EKG  Procedure  Sedation:yes  Performed under: MAC  Guidance:landmark technique  Images:still images not obtained  Loss of twitch: 0.5 mA  Laterality:right  Block Type:supraclavicular  Injection Technique:single-shotNeedle Gauge:20 G  Resistance on Injection: less than 15 psi    Medications Used: ropivacaine (NAROPIN) injection 0.5 %, 15 mg  Med admintered at 8/30/2020 3:10 PM      Post Assessment  Patient Tolerance:comfortable throughout block  Complications:no

## 2020-08-30 NOTE — ANESTHESIA PREPROCEDURE EVALUATION
Anesthesia Evaluation     Patient summary reviewed and Nursing notes reviewed   no history of anesthetic complications:  NPO Solid Status: > 8 hours  NPO Liquid Status: > 8 hours           Airway   Mallampati: I  TM distance: >3 FB  Neck ROM: full  No difficulty expected  Dental - normal exam     Pulmonary - negative pulmonary ROS and normal exam   Cardiovascular - negative cardio ROS and normal exam  Exercise tolerance: good (4-7 METS)    NYHA Classification: II        Neuro/Psych- negative ROS  GI/Hepatic/Renal/Endo - negative ROS     Musculoskeletal (-) negative ROS    Abdominal  - normal exam    Bowel sounds: normal.   Substance History - negative use     OB/GYN negative ob/gyn ROS         Other - negative ROS                       Anesthesia Plan    ASA 2     general with block     intravenous induction     Anesthetic plan, all risks, benefits, and alternatives have been provided, discussed and informed consent has been obtained with: patient.

## 2020-08-30 NOTE — ANESTHESIA POSTPROCEDURE EVALUATION
Patient: Ana Laura Masters    Procedure Summary     Date:  08/30/20 Room / Location:  [facility] OR  /  [facility] OR    Anesthesia Start:  1616 Anesthesia Stop:  1831    Procedure:  RIGHT SHOULDER HUMERUS PROXIMAL OPEN REDUCTION INTERNAL FIXATION WITH PLATE AND SCREW (Right Shoulder) Diagnosis:       Closed displaced fracture of surgical neck of right humerus, unspecified fracture morphology, initial encounter      (Closed displaced fracture of surgical neck of right humerus, unspecified fracture morphology, initial encounter [S42.211A])    Surgeon:  Eleazar Núñez MD Provider:  Isaac Murry MD    Anesthesia Type:  general with block ASA Status:  2          Anesthesia Type: general with block    Vitals  Vitals Value Taken Time   /86 8/30/2020  6:32 PM   Temp 97.1 °F (36.2 °C) 8/30/2020  6:32 PM   Pulse 89 8/30/2020  6:32 PM   Resp 17 8/30/2020  6:32 PM   SpO2 100 % 8/30/2020  6:32 PM           Post Anesthesia Care and Evaluation    Patient location during evaluation: PHASE II  Patient participation: complete - patient participated  Level of consciousness: awake and alert  Pain score: 4  Pain management: adequate  Airway patency: patent  Anesthetic complications: No anesthetic complications  PONV Status: controlled  Cardiovascular status: acceptable  Respiratory status: acceptable  Hydration status: acceptable

## 2020-08-31 ENCOUNTER — READMISSION MANAGEMENT (OUTPATIENT)
Dept: CALL CENTER | Facility: HOSPITAL | Age: 60
End: 2020-08-31

## 2020-08-31 VITALS
WEIGHT: 183.4 LBS | BODY MASS INDEX: 30.56 KG/M2 | OXYGEN SATURATION: 95 % | RESPIRATION RATE: 18 BRPM | DIASTOLIC BLOOD PRESSURE: 76 MMHG | HEART RATE: 87 BPM | HEIGHT: 65 IN | TEMPERATURE: 97.6 F | SYSTOLIC BLOOD PRESSURE: 149 MMHG

## 2020-08-31 LAB
25(OH)D3 SERPL-MCNC: 39.8 NG/ML (ref 30–100)
ABO GROUP BLD: NORMAL
ANION GAP SERPL CALCULATED.3IONS-SCNC: 10.4 MMOL/L (ref 5–15)
BUN SERPL-MCNC: 9 MG/DL (ref 6–20)
BUN/CREAT SERPL: 12.3 (ref 7–25)
CALCIUM SPEC-SCNC: 8.4 MG/DL (ref 8.6–10.5)
CHLORIDE SERPL-SCNC: 105 MMOL/L (ref 98–107)
CO2 SERPL-SCNC: 17.6 MMOL/L (ref 22–29)
CREAT SERPL-MCNC: 0.73 MG/DL (ref 0.57–1)
DEPRECATED RDW RBC AUTO: 59.7 FL (ref 37–54)
ERYTHROCYTE [DISTWIDTH] IN BLOOD BY AUTOMATED COUNT: 18.8 % (ref 12.3–15.4)
GFR SERPL CREATININE-BSD FRML MDRD: 82 ML/MIN/1.73
GLUCOSE BLDC GLUCOMTR-MCNC: 98 MG/DL (ref 70–130)
GLUCOSE SERPL-MCNC: 227 MG/DL (ref 65–99)
HBA1C MFR BLD: 5.3 % (ref 4.8–5.6)
HCT VFR BLD AUTO: 32.7 % (ref 34–46.6)
HGB BLD-MCNC: 9 G/DL (ref 12–15.9)
MAGNESIUM SERPL-MCNC: 1.8 MG/DL (ref 1.6–2.6)
MCH RBC QN AUTO: 23.9 PG (ref 26.6–33)
MCHC RBC AUTO-ENTMCNC: 27.5 G/DL (ref 31.5–35.7)
MCV RBC AUTO: 87 FL (ref 79–97)
PLATELET # BLD AUTO: 238 10*3/MM3 (ref 140–450)
PMV BLD AUTO: 9.9 FL (ref 6–12)
POTASSIUM SERPL-SCNC: 4.2 MMOL/L (ref 3.5–5.2)
RBC # BLD AUTO: 3.76 10*6/MM3 (ref 3.77–5.28)
RH BLD: POSITIVE
SODIUM SERPL-SCNC: 133 MMOL/L (ref 136–145)
WBC # BLD AUTO: 8.86 10*3/MM3 (ref 3.4–10.8)

## 2020-08-31 PROCEDURE — 83036 HEMOGLOBIN GLYCOSYLATED A1C: CPT | Performed by: PHYSICIAN ASSISTANT

## 2020-08-31 PROCEDURE — 83735 ASSAY OF MAGNESIUM: CPT | Performed by: ORTHOPAEDIC SURGERY

## 2020-08-31 PROCEDURE — 85027 COMPLETE CBC AUTOMATED: CPT | Performed by: ORTHOPAEDIC SURGERY

## 2020-08-31 PROCEDURE — 80048 BASIC METABOLIC PNL TOTAL CA: CPT | Performed by: ORTHOPAEDIC SURGERY

## 2020-08-31 PROCEDURE — 25010000002 ONDANSETRON PER 1 MG: Performed by: ORTHOPAEDIC SURGERY

## 2020-08-31 PROCEDURE — 86901 BLOOD TYPING SEROLOGIC RH(D): CPT

## 2020-08-31 PROCEDURE — 82306 VITAMIN D 25 HYDROXY: CPT | Performed by: ORTHOPAEDIC SURGERY

## 2020-08-31 PROCEDURE — 25010000002 MORPHINE PER 10 MG: Performed by: ORTHOPAEDIC SURGERY

## 2020-08-31 PROCEDURE — 82962 GLUCOSE BLOOD TEST: CPT

## 2020-08-31 PROCEDURE — 86900 BLOOD TYPING SEROLOGIC ABO: CPT

## 2020-08-31 PROCEDURE — 99239 HOSP IP/OBS DSCHRG MGMT >30: CPT | Performed by: PHYSICIAN ASSISTANT

## 2020-08-31 PROCEDURE — 25010000003 CEFAZOLIN SODIUM-DEXTROSE 2-3 GM-%(50ML) RECONSTITUTED SOLUTION: Performed by: ORTHOPAEDIC SURGERY

## 2020-08-31 RX ORDER — CEFDINIR 300 MG/1
300 CAPSULE ORAL 2 TIMES DAILY
Qty: 10 CAPSULE | Refills: 0 | Status: SHIPPED | OUTPATIENT
Start: 2020-08-31 | End: 2020-11-12

## 2020-08-31 RX ADMIN — CEFAZOLIN SODIUM 2 G: 2 SOLUTION INTRAVENOUS at 01:25

## 2020-08-31 RX ADMIN — MORPHINE SULFATE 4 MG: 4 INJECTION, SOLUTION INTRAMUSCULAR; INTRAVENOUS at 00:26

## 2020-08-31 RX ADMIN — HYDROCODONE BITARTRATE AND ACETAMINOPHEN 1 TABLET: 5; 325 TABLET ORAL at 16:31

## 2020-08-31 RX ADMIN — DOCUSATE SODIUM 50 MG AND SENNOSIDES 8.6 MG 2 TABLET: 8.6; 5 TABLET, FILM COATED ORAL at 08:23

## 2020-08-31 RX ADMIN — MORPHINE SULFATE 4 MG: 4 INJECTION, SOLUTION INTRAMUSCULAR; INTRAVENOUS at 06:38

## 2020-08-31 RX ADMIN — ONDANSETRON 4 MG: 2 INJECTION INTRAMUSCULAR; INTRAVENOUS at 08:32

## 2020-08-31 RX ADMIN — CEFAZOLIN SODIUM 2 G: 2 SOLUTION INTRAVENOUS at 08:23

## 2020-08-31 RX ADMIN — SODIUM CHLORIDE, PRESERVATIVE FREE 10 ML: 5 INJECTION INTRAVENOUS at 08:24

## 2020-08-31 RX ADMIN — HYDROCODONE BITARTRATE AND ACETAMINOPHEN 1 TABLET: 5; 325 TABLET ORAL at 10:39

## 2020-09-01 ENCOUNTER — TRANSITIONAL CARE MANAGEMENT TELEPHONE ENCOUNTER (OUTPATIENT)
Dept: CALL CENTER | Facility: HOSPITAL | Age: 60
End: 2020-09-01

## 2020-09-01 LAB — BACTERIA SPEC AEROBE CULT: ABNORMAL

## 2020-09-01 NOTE — OUTREACH NOTE
Call Center TCM Note      Responses   [facility] patient discharged from?  [facility]   Does the patient have one of the following disease processes/diagnoses(primary or secondary)?  General Surgery   TCM attempt successful?  Yes   Call start time  1741   Discharge diagnosis  S/P fall w/ rt shoulder injury,  s/p right shoulder proximal humerus orif with plate and screws    Meds reviewed with patient/caregiver?  Yes   Is the patient having any side effects they believe may be caused by any medication additions or changes?  No   Does the patient have all medications related to this admission filled (includes all antibiotics, pain medications, etc.)  Yes   Is the patient taking all medications as directed (includes completed medication regime)?  Yes   Does the patient have a follow up appointment scheduled with their surgeon?  No   What is preventing the patient from scheduling follow up appointments?  Waiting on return call   Nursing Interventions  Educated patient on importance of making appointment, Advised patient to make appointment, Verified appointment date/time/provider   Has the patient kept scheduled appointments due by today?  N/A   Has home health visited the patient within 72 hours of discharge?  N/A   Psychosocial issues?  No   Did the patient receive a copy of their discharge instructions?  Yes   Nursing interventions  Reviewed instructions with patient   What is the patient's perception of their health status since discharge?  Improving   Nursing interventions  Nurse provided patient education   Is the patient /caregiver able to teach back basic post-op care?  Take showers only when approved by MD-sponge bathe until then, No tub bath, swimming, or hot tub until instructed by MD   Is the patient/caregiver able to teach back signs and symptoms of incisional infection?  Increased redness, swelling or pain at the incisonal site, Increased drainage or bleeding, Fever   Is the patient/caregiver able to  teach back steps to recovery at home?  Set small, achievable goals for return to baseline health, Rest and rebuild strength, gradually increase activity, Make a list of questions for surgeon's appointment   Is the patient/caregiver able to teach back the hierarchy of who to call/visit for symptoms/problems? PCP, Specialist, Home health nurse, Urgent Care, ED, 911  Yes          Azael Jackman RN    9/1/2020, 17:47

## 2020-09-04 ENCOUNTER — OFFICE VISIT (OUTPATIENT)
Dept: FAMILY MEDICINE CLINIC | Facility: CLINIC | Age: 60
End: 2020-09-04

## 2020-09-04 VITALS
HEART RATE: 93 BPM | WEIGHT: 189 LBS | OXYGEN SATURATION: 98 % | SYSTOLIC BLOOD PRESSURE: 112 MMHG | HEIGHT: 65 IN | TEMPERATURE: 97.3 F | DIASTOLIC BLOOD PRESSURE: 82 MMHG | BODY MASS INDEX: 31.49 KG/M2

## 2020-09-04 DIAGNOSIS — S42.211A CLOSED DISPLACED FRACTURE OF SURGICAL NECK OF RIGHT HUMERUS, UNSPECIFIED FRACTURE MORPHOLOGY, INITIAL ENCOUNTER: Primary | ICD-10-CM

## 2020-09-04 DIAGNOSIS — N39.0 ACUTE UTI: ICD-10-CM

## 2020-09-04 PROCEDURE — 99203 OFFICE O/P NEW LOW 30 MIN: CPT | Performed by: NURSE PRACTITIONER

## 2020-09-04 RX ORDER — IBUPROFEN 800 MG/1
800 TABLET ORAL EVERY 6 HOURS PRN
COMMUNITY
End: 2020-09-04 | Stop reason: SDUPTHER

## 2020-09-04 RX ORDER — CHOLECALCIFEROL (VITAMIN D3) 125 MCG
10 CAPSULE ORAL NIGHTLY PRN
COMMUNITY

## 2020-09-04 RX ORDER — IBUPROFEN 800 MG/1
800 TABLET ORAL EVERY 8 HOURS PRN
Qty: 90 TABLET | Refills: 1 | OUTPATIENT
Start: 2020-09-04 | End: 2022-05-17

## 2020-09-04 NOTE — PROGRESS NOTES
Subjective   Ana Laura Masters is a 60 y.o. female.     Chief Complaint: Transitional Care Management    History of Present Illness   Patient is here to establish care today.  Patient has not been following with a primary care provider.  Patient was hospitalized at [facility] on 8/30/2020 and discharged home on 8/31/2020.  The following notes were reviewed from her hospital discharge summary:  Ms. Masters is a 60 y.o. female presented to [facility] complaining of fall with subsequent shoulder pain. She had experienced a recent death in the family and had mixed an alcoholic beverage and became intoxicated. She reported tripping on her rug and falling into her breaker box. She came to the ED for further evaluation and therapy. She denies regular alcohol intake.  Please see the admitting history and physical for further details.    She was admitted to the medical/surgical unit, made NPO status, and given PRN IV analgesics and IV fluids. She had mild hypokalemia, which was replaced. She was noted to have asymptomatic bacteruria with very few WBCs and it was therefore not treated. Orthopedic surgery was consulted and she underwent ORIF of the right humerus on the day of admission which she tolerated well without any acute complications. She was monitored overnight without any significant events. Vitamin D is sufficient. On 08/31/20, she was ambulating and using the restroom on her own. She was anxious for discharge home second to a death in the family. Orthopedic surgery evaluated and felt she was stable for discharge home. She is to keep the right upper extremity in the sling, change her dressing daily, and keep the incision dry. Follow up with orthopedic surgery in 10 days. The patient is to stop by their office today to  a prescription for pain medications.   After discharge, it was noted that the patient's urine culture is growing >100,000 CFU gram - bacilli. Omnicef 500mg PO BID was sent  in to her [facility] pharmacy. I attempted to contact the patient over the phone with no answer. Will try again tomorrow. She is to follow up with PCP regarding final culture results. She was set up with PAULA Andre at discharge.     Patient states that she is doing really well since her surgery.  She has a follow-up appointment with Dr. rodríguez, orthopedic surgeon, on 9/15/2020 to have staples removed from her surgical procedure.  She was given a prescription for narcotic pain medicine; however, she states that ibuprofen helps with her pain and she does not take any of the narcotics.  She is doing well and her pain is minimal at this point.  Patient is also taking the Omnicef that was prescribed for her for her UTI.  She denies any issues with dysuria.  She states that she will continue taking the Omnicef until it is completely finished.    Family History   Problem Relation Age of Onset   • Stomach cancer Mother    • No Known Problems Father        Social History     Socioeconomic History   • Marital status:      Spouse name: Not on file   • Number of children: Not on file   • Years of education: Not on file   • Highest education level: Not on file   Tobacco Use   • Smoking status: Never Smoker   • Smokeless tobacco: Never Used   Substance and Sexual Activity   • Alcohol use: No   • Drug use: No   • Sexual activity: Defer       Past Medical History:   Diagnosis Date   • Shoulder fracture        Review of Systems   Constitutional: Negative.    HENT: Negative.    Respiratory: Negative.    Cardiovascular: Negative.    Gastrointestinal: Negative.    Musculoskeletal: Negative.    Skin: Negative.    Neurological: Negative.    Psychiatric/Behavioral: Negative.        Objective   Physical Exam   Constitutional: She is oriented to person, place, and time. She appears well-developed and well-nourished.   Neck: Normal range of motion. Neck supple.   Cardiovascular: Normal rate, regular rhythm and normal heart sounds.  "  Pulmonary/Chest: Effort normal and breath sounds normal.   Neurological: She is alert and oriented to person, place, and time.   Skin: Skin is warm and dry.   Right shoulder surgical incision clean and dry; no drainage noted dressing intact; patient wearing arm sling   Psychiatric: She has a normal mood and affect. Her behavior is normal. Judgment and thought content normal.   Nursing note and vitals reviewed.      Procedures    Vitals: Blood pressure 112/82, pulse 93, temperature 97.3 °F (36.3 °C), height 165.1 cm (65\"), weight 85.7 kg (189 lb), SpO2 98 %, not currently breastfeeding.    Body mass index is 31.45 kg/m².     Allergies:   Allergies   Allergen Reactions   • Vancomycin Rash        During this visit the following were done:  Labs Reviewed []    Labs Ordered []    Radiology Reports Reviewed []    Radiology Ordered []    PCP Records Reviewed []    Referring Provider Records Reviewed []    ER Records Reviewed []    Hospital Records Reviewed []    History Obtained From Family []    Radiology Images Reviewed []    Other Reviewed []    Records Requested []      Assessment/Plan   Ana Laura was seen today for transitional care management.    Diagnoses and all orders for this visit:    Closed displaced fracture of surgical neck of right humerus, unspecified fracture morphology, initial encounter  -     ibuprofen (ADVIL,MOTRIN) 800 MG tablet; Take 1 tablet by mouth Every 8 (Eight) Hours As Needed for Mild Pain  or Moderate Pain .    Acute UTI      Patient to keep her appointment with Dr. rodríguez is scheduled.  Patient to completely finish antibiotics for UTI.  Patient will return to the office in 1 month for flu immunization.         "

## 2020-09-11 ENCOUNTER — APPOINTMENT (OUTPATIENT)
Dept: GENERAL RADIOLOGY | Facility: HOSPITAL | Age: 60
End: 2020-09-11

## 2020-09-11 ENCOUNTER — APPOINTMENT (OUTPATIENT)
Dept: ULTRASOUND IMAGING | Facility: HOSPITAL | Age: 60
End: 2020-09-11

## 2020-09-11 ENCOUNTER — APPOINTMENT (OUTPATIENT)
Dept: CT IMAGING | Facility: HOSPITAL | Age: 60
End: 2020-09-11

## 2020-09-11 ENCOUNTER — HOSPITAL ENCOUNTER (EMERGENCY)
Facility: HOSPITAL | Age: 60
Discharge: HOME | End: 2020-09-11
Attending: EMERGENCY MEDICINE | Admitting: EMERGENCY MEDICINE

## 2020-09-11 VITALS
RESPIRATION RATE: 18 BRPM | HEART RATE: 88 BPM | HEIGHT: 65 IN | OXYGEN SATURATION: 99 % | BODY MASS INDEX: 30.82 KG/M2 | WEIGHT: 185 LBS | SYSTOLIC BLOOD PRESSURE: 177 MMHG | DIASTOLIC BLOOD PRESSURE: 77 MMHG | TEMPERATURE: 98.8 F

## 2020-09-11 DIAGNOSIS — Z87.81 HISTORY OF HUMERUS FRACTURE: ICD-10-CM

## 2020-09-11 DIAGNOSIS — M79.601 PAIN OF RIGHT UPPER EXTREMITY: Primary | ICD-10-CM

## 2020-09-11 LAB
ALBUMIN SERPL-MCNC: 4.22 G/DL (ref 3.5–5.2)
ALBUMIN/GLOB SERPL: 1.3 G/DL
ALP SERPL-CCNC: 111 U/L (ref 39–117)
ALT SERPL W P-5'-P-CCNC: 19 U/L (ref 1–33)
ANION GAP SERPL CALCULATED.3IONS-SCNC: 14.1 MMOL/L (ref 5–15)
ANISOCYTOSIS BLD QL: NORMAL
APTT PPP: 27.3 SECONDS (ref 25.6–35.3)
AST SERPL-CCNC: 27 U/L (ref 1–32)
BASOPHILS # BLD AUTO: 0.04 10*3/MM3 (ref 0–0.2)
BASOPHILS NFR BLD AUTO: 0.4 % (ref 0–1.5)
BILIRUB SERPL-MCNC: 0.4 MG/DL (ref 0–1.2)
BUN SERPL-MCNC: 13 MG/DL (ref 6–20)
BUN/CREAT SERPL: 17.8 (ref 7–25)
CALCIUM SPEC-SCNC: 9.5 MG/DL (ref 8.6–10.5)
CHLORIDE SERPL-SCNC: 106 MMOL/L (ref 98–107)
CO2 SERPL-SCNC: 20.9 MMOL/L (ref 22–29)
CREAT SERPL-MCNC: 0.73 MG/DL (ref 0.57–1)
D DIMER PPP FEU-MCNC: 3.13 MCGFEU/ML (ref 0–0.5)
DEPRECATED RDW RBC AUTO: 59.6 FL (ref 37–54)
EOSINOPHIL # BLD AUTO: 0.12 10*3/MM3 (ref 0–0.4)
EOSINOPHIL NFR BLD AUTO: 1.3 % (ref 0.3–6.2)
ERYTHROCYTE [DISTWIDTH] IN BLOOD BY AUTOMATED COUNT: 19.9 % (ref 12.3–15.4)
ETHANOL BLD-MCNC: <10 MG/DL (ref 0–10)
ETHANOL UR QL: <0.01 %
GFR SERPL CREATININE-BSD FRML MDRD: 82 ML/MIN/1.73
GLOBULIN UR ELPH-MCNC: 3.3 GM/DL
GLUCOSE SERPL-MCNC: 98 MG/DL (ref 65–99)
HCT VFR BLD AUTO: 35.6 % (ref 34–46.6)
HGB BLD-MCNC: 10.1 G/DL (ref 12–15.9)
HOLD SPECIMEN: NORMAL
HOLD SPECIMEN: NORMAL
HYPOCHROMIA BLD QL: NORMAL
IMM GRANULOCYTES # BLD AUTO: 0.03 10*3/MM3 (ref 0–0.05)
IMM GRANULOCYTES NFR BLD AUTO: 0.3 % (ref 0–0.5)
INR PPP: 0.93 (ref 0.9–1.1)
LYMPHOCYTES # BLD AUTO: 1.38 10*3/MM3 (ref 0.7–3.1)
LYMPHOCYTES NFR BLD AUTO: 15.4 % (ref 19.6–45.3)
MCH RBC QN AUTO: 23.7 PG (ref 26.6–33)
MCHC RBC AUTO-ENTMCNC: 28.4 G/DL (ref 31.5–35.7)
MCV RBC AUTO: 83.4 FL (ref 79–97)
MONOCYTES # BLD AUTO: 0.63 10*3/MM3 (ref 0.1–0.9)
MONOCYTES NFR BLD AUTO: 7 % (ref 5–12)
NEUTROPHILS NFR BLD AUTO: 6.77 10*3/MM3 (ref 1.7–7)
NEUTROPHILS NFR BLD AUTO: 75.6 % (ref 42.7–76)
NRBC BLD AUTO-RTO: 0 /100 WBC (ref 0–0.2)
NT-PROBNP SERPL-MCNC: 64.4 PG/ML (ref 0–900)
PLAT MORPH BLD: NORMAL
PLATELET # BLD AUTO: 504 10*3/MM3 (ref 140–450)
PMV BLD AUTO: 9.2 FL (ref 6–12)
POTASSIUM SERPL-SCNC: 4.3 MMOL/L (ref 3.5–5.2)
PROT SERPL-MCNC: 7.5 G/DL (ref 6–8.5)
PROTHROMBIN TIME: 12.2 SECONDS (ref 11.9–14.1)
RBC # BLD AUTO: 4.27 10*6/MM3 (ref 3.77–5.28)
SODIUM SERPL-SCNC: 141 MMOL/L (ref 136–145)
TROPONIN T SERPL-MCNC: <0.01 NG/ML (ref 0–0.03)
WBC # BLD AUTO: 8.97 10*3/MM3 (ref 3.4–10.8)
WHOLE BLOOD HOLD SPECIMEN: NORMAL
WHOLE BLOOD HOLD SPECIMEN: NORMAL

## 2020-09-11 PROCEDURE — 93010 ELECTROCARDIOGRAM REPORT: CPT | Performed by: INTERNAL MEDICINE

## 2020-09-11 PROCEDURE — 71045 X-RAY EXAM CHEST 1 VIEW: CPT

## 2020-09-11 PROCEDURE — 84484 ASSAY OF TROPONIN QUANT: CPT | Performed by: EMERGENCY MEDICINE

## 2020-09-11 PROCEDURE — 99284 EMERGENCY DEPT VISIT MOD MDM: CPT

## 2020-09-11 PROCEDURE — 0 IOVERSOL 74 % SOLUTION: Performed by: EMERGENCY MEDICINE

## 2020-09-11 PROCEDURE — 73030 X-RAY EXAM OF SHOULDER: CPT | Performed by: RADIOLOGY

## 2020-09-11 PROCEDURE — 71275 CT ANGIOGRAPHY CHEST: CPT

## 2020-09-11 PROCEDURE — 85007 BL SMEAR W/DIFF WBC COUNT: CPT | Performed by: EMERGENCY MEDICINE

## 2020-09-11 PROCEDURE — 73080 X-RAY EXAM OF ELBOW: CPT | Performed by: RADIOLOGY

## 2020-09-11 PROCEDURE — 83880 ASSAY OF NATRIURETIC PEPTIDE: CPT | Performed by: EMERGENCY MEDICINE

## 2020-09-11 PROCEDURE — 93005 ELECTROCARDIOGRAM TRACING: CPT | Performed by: EMERGENCY MEDICINE

## 2020-09-11 PROCEDURE — 85610 PROTHROMBIN TIME: CPT | Performed by: EMERGENCY MEDICINE

## 2020-09-11 PROCEDURE — 85730 THROMBOPLASTIN TIME PARTIAL: CPT | Performed by: EMERGENCY MEDICINE

## 2020-09-11 PROCEDURE — 73090 X-RAY EXAM OF FOREARM: CPT | Performed by: RADIOLOGY

## 2020-09-11 PROCEDURE — 73030 X-RAY EXAM OF SHOULDER: CPT

## 2020-09-11 PROCEDURE — 80307 DRUG TEST PRSMV CHEM ANLYZR: CPT | Performed by: EMERGENCY MEDICINE

## 2020-09-11 PROCEDURE — 93971 EXTREMITY STUDY: CPT

## 2020-09-11 PROCEDURE — 73080 X-RAY EXAM OF ELBOW: CPT

## 2020-09-11 PROCEDURE — 73090 X-RAY EXAM OF FOREARM: CPT

## 2020-09-11 PROCEDURE — 85025 COMPLETE CBC W/AUTO DIFF WBC: CPT | Performed by: EMERGENCY MEDICINE

## 2020-09-11 PROCEDURE — 71045 X-RAY EXAM CHEST 1 VIEW: CPT | Performed by: RADIOLOGY

## 2020-09-11 PROCEDURE — 85379 FIBRIN DEGRADATION QUANT: CPT | Performed by: EMERGENCY MEDICINE

## 2020-09-11 PROCEDURE — 80053 COMPREHEN METABOLIC PANEL: CPT | Performed by: EMERGENCY MEDICINE

## 2020-09-11 RX ORDER — SODIUM CHLORIDE 0.9 % (FLUSH) 0.9 %
10 SYRINGE (ML) INJECTION AS NEEDED
Status: DISCONTINUED | OUTPATIENT
Start: 2020-09-11 | End: 2020-09-11 | Stop reason: HOSPADM

## 2020-09-11 RX ADMIN — IOVERSOL 70 ML: 741 INJECTION INTRA-ARTERIAL; INTRAVENOUS at 20:26

## 2020-09-11 NOTE — ED PROVIDER NOTES
Subjective   Patient is a 60-year-old female who had open reduction internal fixation of a displaced right humeral neck fracture on August 31.  She presents today complaining that the nerve block to her right arm just wore off day before yesterday, and since that time she has been having pain in her right elbow and proximal right forearm, wonders if she had broken her arm here as well.  She also reports concerned that she might have a blood clot in her right arm, as she does have a remote history of DVT/pulmonary embolism after her gastric bypass surgery several years ago; she states that she has never been on anticoagulation other than an aspirin 81 mg daily.  She reports that she noted 2 days ago that when she exerts herself she feels mildly dyspneic.  She denies fever, chills, diaphoresis, chest pain, cough, abdominal pain, vomiting, numbness, tingling, weakness, other symptoms or other complaints.          Review of Systems   Constitutional: Negative for chills, diaphoresis and fever.   HENT: Negative for ear pain, sore throat and trouble swallowing.    Eyes: Negative for photophobia and pain.   Respiratory: Positive for shortness of breath. Negative for cough, wheezing and stridor.    Cardiovascular: Negative for chest pain and palpitations.   Gastrointestinal: Negative for abdominal distention, abdominal pain, blood in stool, diarrhea, nausea and vomiting.   Endocrine: Negative for polydipsia and polyphagia.   Genitourinary: Negative for difficulty urinating and flank pain.   Musculoskeletal: Negative for back pain, neck pain and neck stiffness.   Skin: Negative for pallor.   Neurological: Negative for seizures, syncope and speech difficulty.   Psychiatric/Behavioral: Negative for confusion.   All other systems reviewed and are negative.      Past Medical History:   Diagnosis Date   • Shoulder fracture        Allergies   Allergen Reactions   • Vancomycin Rash       Past Surgical History:   Procedure Laterality  Date   • BLADDER REPAIR     • CHOLECYSTECTOMY     • GASTRIC BYPASS     • HYSTERECTOMY     • LASIK     • LUMBAR DISC SURGERY      L5 fusion   • ORIF HUMERUS FRACTURE Right 8/30/2020    Procedure: RIGHT SHOULDER HUMERUS PROXIMAL OPEN REDUCTION INTERNAL FIXATION WITH PLATE AND SCREW;  Surgeon: Eleazar Núñez MD;  Location: [facility];  Service: Orthopedics;  Laterality: Right;   • TOTAL SHOULDER REPLACEMENT      right        Family History   Problem Relation Age of Onset   • Stomach cancer Mother    • No Known Problems Father        Social History     Socioeconomic History   • Marital status:      Spouse name: Not on file   • Number of children: Not on file   • Years of education: Not on file   • Highest education level: Not on file   Tobacco Use   • Smoking status: Never Smoker   • Smokeless tobacco: Never Used   Substance and Sexual Activity   • Alcohol use: No   • Drug use: No   • Sexual activity: Defer           Objective   Physical Exam   Constitutional: She is oriented to person, place, and time. She appears well-developed and well-nourished. No distress.   Patient guards her right upper extremity, does not otherwise to be in apparent distress.   HENT:   Head: Normocephalic and atraumatic.   Eyes: Pupils are equal, round, and reactive to light. EOM are normal. No scleral icterus.   Neck: Normal range of motion. Neck supple. No neck rigidity. No tracheal deviation present.   Cardiovascular: Normal rate, regular rhythm and intact distal pulses.   Pulmonary/Chest: Effort normal and breath sounds normal. No respiratory distress. She exhibits no tenderness.   Abdominal: Soft. Bowel sounds are normal. There is no tenderness. There is no rebound and no guarding.   Musculoskeletal: Normal range of motion.   There is old bruising about the right shoulder and upper arm.  Range of motion is painful.  She will not allow much range of motion of the shoulder, she is able to flex and extend the elbow, as well as  supinate and pronate the arm.  Distal neurovascular is intact.  There is no redness, edema, or increased warmth.   Neurological: She is alert and oriented to person, place, and time. She has normal strength. No sensory deficit. She exhibits normal muscle tone. Coordination normal. GCS eye subscore is 4. GCS verbal subscore is 5. GCS motor subscore is 6.   Skin: Skin is warm and dry. Capillary refill takes less than 2 seconds. She is not diaphoretic. No cyanosis. No pallor.   Psychiatric: She has a normal mood and affect. Her behavior is normal.   Nursing note and vitals reviewed.      Procedures  EKG shows sinus rhythm with rate 79.  No apparent acute ischemia.  CT Chest Pulmonary Embolism   Final Result   1. Negative for pulmonary embolus.      2. No acute findings in the chest.      Signer Name: Kevin Cortez MD    Signed: 9/11/2020 8:42 PM    Workstation Name: [id number]     [facility]      US Venous Doppler Upper Extremity Right (duplex)   Final Result   Negative examination.  No evidence of right upper extremity venous thrombosis.      Signer Name: Luis Alberto Christian MD    Signed: 9/11/2020 6:42 PM    Workstation Name: [id number]     [facility]      XR Shoulder 2+ View Right   Final Result   As above.       COMMUNICATION: Per this written report.       This report was finalized on 9/11/2020 5:02 PM by Dr. Daniel Baptiste MD.          XR Forearm 2 View Right   Final Result   No acute or destructive bony abnormality.       COMMUNICATION: Per this written report.       This report was finalized on 9/11/2020 5:02 PM by Dr. Daniel Baptiste MD.          XR Elbow 3+ View Right   Final Result   No acute or destructive bony abnormality.       COMMUNICATION: Per this written report.       This report was finalized on 9/11/2020 5:02 PM by Dr. Daniel Baptiste MD.          XR Chest 1 View   Final Result   No radiographic evidence of acute cardiac or pulmonary   disease.       This  report was finalized on 9/11/2020 5:02 PM by Dr. Daniel Baptiste MD.            Results for orders placed or performed during the hospital encounter of 09/11/20   Comprehensive Metabolic Panel   Result Value Ref Range    Glucose 98 65 - 99 mg/dL    BUN 13 6 - 20 mg/dL    Creatinine 0.73 0.57 - 1.00 mg/dL    Sodium 141 136 - 145 mmol/L    Potassium 4.3 3.5 - 5.2 mmol/L    Chloride 106 98 - 107 mmol/L    CO2 20.9 (L) 22.0 - 29.0 mmol/L    Calcium 9.5 8.6 - 10.5 mg/dL    Total Protein 7.5 6.0 - 8.5 g/dL    Albumin 4.22 3.50 - 5.20 g/dL    ALT (SGPT) 19 1 - 33 U/L    AST (SGOT) 27 1 - 32 U/L    Alkaline Phosphatase 111 39 - 117 U/L    Total Bilirubin 0.4 0.0 - 1.2 mg/dL    eGFR Non African Amer 82 >60 mL/min/1.73    Globulin 3.3 gm/dL    A/G Ratio 1.3 g/dL    BUN/Creatinine Ratio 17.8 7.0 - 25.0    Anion Gap 14.1 5.0 - 15.0 mmol/L   Protime-INR   Result Value Ref Range    Protime 12.2 11.9 - 14.1 Seconds    INR 0.93 0.90 - 1.10   aPTT   Result Value Ref Range    PTT 27.3 25.6 - 35.3 seconds   BNP   Result Value Ref Range    proBNP 64.4 0.0 - 900.0 pg/mL   D-dimer, Quantitative   Result Value Ref Range    D-Dimer, Quantitative 3.13 (C) 0.00 - 0.50 MCGFEU/mL   Troponin   Result Value Ref Range    Troponin T <0.010 0.000 - 0.030 ng/mL   CBC Auto Differential   Result Value Ref Range    WBC 8.97 3.40 - 10.80 10*3/mm3    RBC 4.27 3.77 - 5.28 10*6/mm3    Hemoglobin 10.1 (L) 12.0 - 15.9 g/dL    Hematocrit 35.6 34.0 - 46.6 %    MCV 83.4 79.0 - 97.0 fL    MCH 23.7 (L) 26.6 - 33.0 pg    MCHC 28.4 (L) 31.5 - 35.7 g/dL    RDW 19.9 (H) 12.3 - 15.4 %    RDW-SD 59.6 (H) 37.0 - 54.0 fl    MPV 9.2 6.0 - 12.0 fL    Platelets 504 (H) 140 - 450 10*3/mm3    Neutrophil % 75.6 42.7 - 76.0 %    Lymphocyte % 15.4 (L) 19.6 - 45.3 %    Monocyte % 7.0 5.0 - 12.0 %    Eosinophil % 1.3 0.3 - 6.2 %    Basophil % 0.4 0.0 - 1.5 %    Immature Grans % 0.3 0.0 - 0.5 %    Neutrophils, Absolute 6.77 1.70 - 7.00 10*3/mm3    Lymphocytes, Absolute 1.38 0.70 -  3.10 10*3/mm3    Monocytes, Absolute 0.63 0.10 - 0.90 10*3/mm3    Eosinophils, Absolute 0.12 0.00 - 0.40 10*3/mm3    Basophils, Absolute 0.04 0.00 - 0.20 10*3/mm3    Immature Grans, Absolute 0.03 0.00 - 0.05 10*3/mm3    nRBC 0.0 0.0 - 0.2 /100 WBC   Ethanol   Result Value Ref Range    Ethanol <10 0 - 10 mg/dL    Ethanol % <0.010 %   Scan Slide   Result Value Ref Range    Anisocytosis Mod/2+ None Seen    Hypochromia Mod/2+ None Seen    Platelet Morphology Normal Normal   Light Blue Top   Result Value Ref Range    Extra Tube hold for add-on    Green Top (Gel)   Result Value Ref Range    Extra Tube Hold for add-ons.    Lavender Top   Result Value Ref Range    Extra Tube hold for add-on    Gold Top - SST   Result Value Ref Range    Extra Tube Hold for add-ons.                 ED Course  ED Course as of Sep 11 2118   Fri Sep 11, 2020   1942 Endorsed to Dr. Oliveira at shift change.    [CM]      ED Course User Index  [CM] Khurram Bauer MD                                           MDM  Number of Diagnoses or Management Options  History of humerus fracture:   Pain of right upper extremity:      Amount and/or Complexity of Data Reviewed  Clinical lab tests: reviewed  Tests in the radiology section of CPT®: reviewed  Tests in the medicine section of CPT®: reviewed    Risk of Complications, Morbidity, and/or Mortality  Presenting problems: high  Diagnostic procedures: high  Management options: moderate        Final diagnoses:   Pain of right upper extremity   History of humerus fracture            Ismael Oliveira MD  09/11/20 2118

## 2020-09-12 NOTE — ED NOTES
Assisted patient in lacing her sling to Left upper extremity on prior to arrival.     Johnny Marmolejo, RN  09/11/20 2137       Johnny Marmolejo, RN  09/11/20 2137

## 2020-11-12 ENCOUNTER — OFFICE VISIT (OUTPATIENT)
Dept: FAMILY MEDICINE CLINIC | Facility: CLINIC | Age: 60
End: 2020-11-12

## 2020-11-12 VITALS
HEART RATE: 98 BPM | OXYGEN SATURATION: 98 % | HEIGHT: 65 IN | DIASTOLIC BLOOD PRESSURE: 78 MMHG | WEIGHT: 183.2 LBS | SYSTOLIC BLOOD PRESSURE: 130 MMHG | TEMPERATURE: 96.9 F | BODY MASS INDEX: 30.52 KG/M2

## 2020-11-12 DIAGNOSIS — Z23 IMMUNIZATION DUE: ICD-10-CM

## 2020-11-12 DIAGNOSIS — M79.601 RIGHT ARM PAIN: Primary | ICD-10-CM

## 2020-11-12 PROCEDURE — 90471 IMMUNIZATION ADMIN: CPT | Performed by: NURSE PRACTITIONER

## 2020-11-12 PROCEDURE — 90686 IIV4 VACC NO PRSV 0.5 ML IM: CPT | Performed by: NURSE PRACTITIONER

## 2020-11-12 PROCEDURE — 99213 OFFICE O/P EST LOW 20 MIN: CPT | Performed by: NURSE PRACTITIONER

## 2020-11-12 RX ORDER — TRAMADOL HYDROCHLORIDE 50 MG/1
50 TABLET ORAL EVERY 6 HOURS PRN
Qty: 12 TABLET | Refills: 0 | OUTPATIENT
Start: 2020-11-12 | End: 2022-05-17

## 2020-11-12 NOTE — PROGRESS NOTES
Subjective   Ana Laura Masters is a 60 y.o. female.     Chief Complaint: Arm Pain    Arm Pain   The incident occurred more than 1 week ago. The incident occurred at home. The injury mechanism was a fall. The pain is present in the right shoulder. The quality of the pain is described as aching and stabbing. The pain does not radiate. The pain is at a severity of 9/10. The pain has been fluctuating since the incident. Pertinent negatives include no numbness or tingling. The symptoms are aggravated by movement. She has tried NSAIDs for the symptoms. The treatment provided no relief.   Patient had surgical intervention on her right shoulder by Dr Núñez on 8/31.  She did well for a couple of weeks and started having a lot more pain in her shoulder.  She returned to see Dr Núñez and had xrays done and was told that the screws he placed were backing out.  He has apparently done a DEXA scan also and she has a follow up appt scheduled to see him on 11/24 to evaluate for any further intervention.  She is having a lot of pain and has requested pain medication until she can return to Dr Núñez.     Family History   Problem Relation Age of Onset   • Stomach cancer Mother    • No Known Problems Father        Social History     Socioeconomic History   • Marital status:      Spouse name: Not on file   • Number of children: Not on file   • Years of education: Not on file   • Highest education level: Not on file   Tobacco Use   • Smoking status: Never Smoker   • Smokeless tobacco: Never Used   Substance and Sexual Activity   • Alcohol use: No   • Drug use: No   • Sexual activity: Defer       Past Medical History:   Diagnosis Date   • Shoulder fracture        Review of Systems   Constitutional: Negative.    HENT: Negative.    Respiratory: Negative.    Cardiovascular: Negative.    Gastrointestinal: Negative.    Musculoskeletal: Positive for arthralgias.   Skin: Negative.    Neurological: Negative.  Negative for tingling and numbness.  "  Psychiatric/Behavioral: Negative.        Objective   Physical Exam  Vitals signs and nursing note reviewed.   Constitutional:       Appearance: She is well-developed.   Neck:      Musculoskeletal: Normal range of motion and neck supple.   Cardiovascular:      Rate and Rhythm: Normal rate and regular rhythm.      Heart sounds: Normal heart sounds.   Pulmonary:      Effort: Pulmonary effort is normal.      Breath sounds: Normal breath sounds.   Musculoskeletal:      Comments: LROM to right arm/shoulder; patient wearing brace to shoulder as instructed by Dr Núñez   Skin:     General: Skin is warm and dry.   Neurological:      Mental Status: She is alert and oriented to person, place, and time.   Psychiatric:         Behavior: Behavior normal.         Thought Content: Thought content normal.         Judgment: Judgment normal.         Procedures    Vitals: Blood pressure 130/78, pulse 98, temperature 96.9 °F (36.1 °C), height 165.1 cm (65\"), weight 83.1 kg (183 lb 3.2 oz), SpO2 98 %, not currently breastfeeding.    Body mass index is 30.49 kg/m².     Allergies:   Allergies   Allergen Reactions   • Vancomycin Rash        During this visit the following were done:  Labs Reviewed []    Labs Ordered []    Radiology Reports Reviewed []    Radiology Ordered []    PCP Records Reviewed []    Referring Provider Records Reviewed []    ER Records Reviewed []    Hospital Records Reviewed []    History Obtained From Family []    Radiology Images Reviewed []    Other Reviewed []    Records Requested []      Assessment/Plan   Diagnoses and all orders for this visit:    1. Right arm pain (Primary)  -     traMADol (ULTRAM) 50 MG tablet; Take 1 tablet by mouth Every 6 (Six) Hours As Needed for Moderate Pain .  Dispense: 12 tablet; Refill: 0    2. Immunization due  -     FluLaval Quad >6 Months ([id number])      Keep appointment with Dr Núñez as scheduled         "

## 2020-11-13 ENCOUNTER — CLINICAL SUPPORT (OUTPATIENT)
Dept: FAMILY MEDICINE CLINIC | Facility: CLINIC | Age: 60
End: 2020-11-13

## 2020-11-13 DIAGNOSIS — Z23 IMMUNIZATION DUE: Primary | ICD-10-CM

## 2020-11-13 PROCEDURE — 90732 PPSV23 VACC 2 YRS+ SUBQ/IM: CPT | Performed by: NURSE PRACTITIONER

## 2020-11-13 PROCEDURE — 90471 IMMUNIZATION ADMIN: CPT | Performed by: NURSE PRACTITIONER

## 2020-12-14 ENCOUNTER — HOSPITAL ENCOUNTER (EMERGENCY)
Facility: HOSPITAL | Age: 60
Discharge: HOME | End: 2020-12-14
Attending: EMERGENCY MEDICINE | Admitting: EMERGENCY MEDICINE

## 2020-12-14 ENCOUNTER — APPOINTMENT (OUTPATIENT)
Dept: GENERAL RADIOLOGY | Facility: HOSPITAL | Age: 60
End: 2020-12-14

## 2020-12-14 VITALS
OXYGEN SATURATION: 98 % | DIASTOLIC BLOOD PRESSURE: 80 MMHG | HEIGHT: 65 IN | BODY MASS INDEX: 30.82 KG/M2 | HEART RATE: 90 BPM | TEMPERATURE: 97.3 F | RESPIRATION RATE: 18 BRPM | WEIGHT: 185 LBS | SYSTOLIC BLOOD PRESSURE: 150 MMHG

## 2020-12-14 DIAGNOSIS — S46.911A STRAIN OF RIGHT SHOULDER, INITIAL ENCOUNTER: Primary | ICD-10-CM
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
## 2020-12-14 PROCEDURE — 73060 X-RAY EXAM OF HUMERUS: CPT

## 2020-12-14 PROCEDURE — 73030 X-RAY EXAM OF SHOULDER: CPT | Performed by: RADIOLOGY

## 2020-12-14 PROCEDURE — 73030 X-RAY EXAM OF SHOULDER: CPT

## 2020-12-14 PROCEDURE — 73060 X-RAY EXAM OF HUMERUS: CPT | Performed by: RADIOLOGY

## 2020-12-14 PROCEDURE — 99282 EMERGENCY DEPT VISIT SF MDM: CPT
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
## 2021-10-10 ENCOUNTER — APPOINTMENT (OUTPATIENT)
Dept: GENERAL RADIOLOGY | Facility: HOSPITAL | Age: 61
End: 2021-10-10

## 2021-10-10 ENCOUNTER — HOSPITAL ENCOUNTER (EMERGENCY)
Facility: HOSPITAL | Age: 61
Discharge: HOME | End: 2021-10-10
Admitting: EMERGENCY MEDICINE

## 2021-10-10 VITALS
HEART RATE: 88 BPM | OXYGEN SATURATION: 98 % | DIASTOLIC BLOOD PRESSURE: 97 MMHG | SYSTOLIC BLOOD PRESSURE: 161 MMHG | BODY MASS INDEX: 28.32 KG/M2 | WEIGHT: 170 LBS | RESPIRATION RATE: 18 BRPM | HEIGHT: 65 IN | TEMPERATURE: 98.5 F

## 2021-10-10 DIAGNOSIS — M25.511 ACUTE PAIN OF RIGHT SHOULDER: Primary | ICD-10-CM
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
## 2021-10-10 PROCEDURE — 25010000002 KETOROLAC TROMETHAMINE PER 15 MG: Performed by: PHYSICIAN ASSISTANT

## 2021-10-10 PROCEDURE — 96372 THER/PROPH/DIAG INJ SC/IM: CPT

## 2021-10-10 PROCEDURE — 25010000002 ORPHENADRINE CITRATE PER 60 MG: Performed by: PHYSICIAN ASSISTANT

## 2021-10-10 PROCEDURE — 73030 X-RAY EXAM OF SHOULDER: CPT

## 2021-10-10 PROCEDURE — 25010000002 METHYLPREDNISOLONE PER 80 MG: Performed by: PHYSICIAN ASSISTANT

## 2021-10-10 PROCEDURE — 99283 EMERGENCY DEPT VISIT LOW MDM: CPT

## 2021-10-10 RX ORDER — KETOROLAC TROMETHAMINE 30 MG/ML
60 INJECTION, SOLUTION INTRAMUSCULAR; INTRAVENOUS ONCE
Status: COMPLETED | OUTPATIENT
Start: 2021-10-10 | End: 2021-10-10

## 2021-10-10 RX ORDER — METHYLPREDNISOLONE ACETATE 80 MG/ML
120 INJECTION, SUSPENSION INTRA-ARTICULAR; INTRALESIONAL; INTRAMUSCULAR; SOFT TISSUE ONCE
Status: COMPLETED | OUTPATIENT
Start: 2021-10-10 | End: 2021-10-10

## 2021-10-10 RX ORDER — CYCLOBENZAPRINE HCL 10 MG
10 TABLET ORAL 3 TIMES DAILY PRN
Qty: 21 TABLET | Refills: 0 | OUTPATIENT
Start: 2021-10-10 | End: 2022-05-17

## 2021-10-10 RX ORDER — IBUPROFEN 600 MG/1
600 TABLET ORAL 3 TIMES DAILY
Qty: 30 TABLET | Refills: 0 | Status: SHIPPED | OUTPATIENT
Start: 2021-10-10 | End: 2022-05-17 | Stop reason: SDUPTHER

## 2021-10-10 RX ORDER — ORPHENADRINE CITRATE 30 MG/ML
60 INJECTION INTRAMUSCULAR; INTRAVENOUS ONCE
Status: COMPLETED | OUTPATIENT
Start: 2021-10-10 | End: 2021-10-10

## 2021-10-10 RX ADMIN — KETOROLAC TROMETHAMINE 60 MG: 30 INJECTION, SOLUTION INTRAMUSCULAR; INTRAVENOUS at 21:34

## 2021-10-10 RX ADMIN — METHYLPREDNISOLONE ACETATE 120 MG: 80 INJECTION, SUSPENSION INTRA-ARTICULAR; INTRALESIONAL; INTRAMUSCULAR; SOFT TISSUE at 21:30

## 2021-10-10 RX ADMIN — ORPHENADRINE CITRATE 60 MG: 30 INJECTION INTRAMUSCULAR; INTRAVENOUS at 21:37

## 2021-10-10 NOTE — ED NOTES
MEDICAL SCREENING:    Reason for Visit: Fall, shoulder pain    Patient initially seen in triage.  The patient was advised further evaluation and diagnostic testing will be needed, some of the treatment and testing will be initiated in the lobby in order to begin the process.  The patient will be returned to the waiting area for the time being and possibly be re-assessed by a subsequent ED provider.  The patient will be brought back to the treatment area in as timely manner as possible.       Johana Cesar, APRN  10/10/21 9584

## 2021-10-11 NOTE — ED PROVIDER NOTES
Subjective     History provided by:  Patient   used: No    Shoulder Injury  Location:  Right shoulder pain   Severity:  Mild  Onset quality:  Sudden  Duration:  1 hour  Timing:  Constant  Progression:  Worsening  Chronicity:  New  Context:  Patient states that her dog caused her to fall this evening and she landed on her shoulder. Pt has hx of shoulder surgery on the same shoulder. fabrizio reports decreased ROM and pain with movement.    Relieved by:  Rest   Worsened by:  Movement       Review of Systems   Constitutional: Negative.    HENT: Negative.    Eyes: Negative.    Respiratory: Negative.    Cardiovascular: Negative.    Gastrointestinal: Negative.    Endocrine: Negative.    Genitourinary: Negative.    Musculoskeletal:        Right shoulder pain    Skin: Negative.    Allergic/Immunologic: Negative.    Neurological: Negative.    Hematological: Negative.    Psychiatric/Behavioral: Negative.    All other systems reviewed and are negative.      Past Medical History:   Diagnosis Date   • Shoulder fracture        Allergies   Allergen Reactions   • Vancomycin Rash       Past Surgical History:   Procedure Laterality Date   • BLADDER REPAIR     • CHOLECYSTECTOMY     • GASTRIC BYPASS     • HYSTERECTOMY     • LASIK     • LUMBAR DISC SURGERY      L5 fusion   • ORIF HUMERUS FRACTURE Right 8/30/2020    Procedure: RIGHT SHOULDER HUMERUS PROXIMAL OPEN REDUCTION INTERNAL FIXATION WITH PLATE AND SCREW;  Surgeon: Eleazar Núñez MD;  Location: [facility];  Service: Orthopedics;  Laterality: Right;   • TOTAL SHOULDER REPLACEMENT      right        Family History   Problem Relation Age of Onset   • Stomach cancer Mother    • No Known Problems Father        Social History     Socioeconomic History   • Marital status:    Tobacco Use   • Smoking status: Never Smoker   • Smokeless tobacco: Never Used   Substance and Sexual Activity   • Alcohol use: No   • Drug use: No   • Sexual activity: Defer            Objective   Physical Exam  Vitals and nursing note reviewed.   Constitutional:       Appearance: Normal appearance. She is normal weight.   HENT:      Head: Normocephalic and atraumatic.      Right Ear: External ear normal.      Left Ear: External ear normal.      Nose: Nose normal.      Mouth/Throat:      Mouth: Mucous membranes are moist.      Pharynx: Oropharynx is clear.   Eyes:      Extraocular Movements: Extraocular movements intact.      Conjunctiva/sclera: Conjunctivae normal.      Pupils: Pupils are equal, round, and reactive to light.   Cardiovascular:      Rate and Rhythm: Normal rate and regular rhythm.      Pulses: Normal pulses.      Heart sounds: Normal heart sounds.   Pulmonary:      Effort: Pulmonary effort is normal.      Breath sounds: Normal breath sounds.   Abdominal:      General: Abdomen is flat. Bowel sounds are normal.      Palpations: Abdomen is soft.   Musculoskeletal:         General: Normal range of motion.      Right shoulder: Tenderness present.      Cervical back: Normal range of motion and neck supple.   Skin:     General: Skin is warm and dry.      Capillary Refill: Capillary refill takes less than 2 seconds.   Neurological:      General: No focal deficit present.      Mental Status: She is alert and oriented to person, place, and time. Mental status is at baseline.   Psychiatric:         Mood and Affect: Mood normal.         Behavior: Behavior normal.         Thought Content: Thought content normal.         Judgment: Judgment normal.         Procedures           ED Course  ED Course as of 10/10/21 2300   Sun Oct 10, 2021   2121 XR Shoulder 2+ View Right  IMPRESSION:     1. No acute appearing fracture. No dislocation.  2. Old fracture deformities of the right humeral head and neck as well as the distal right clavicle.  3. Mild AC joint arthrosis.     Signer Name: Andi Woods MD   Signed: 10/10/2021 7:55 PM   Workstation Name: [id number]    [facility]          Specimen Collected: 10/10/21 19:55         [ML]      ED Course User Index  [ML] Karin Erazo PA MDM  Number of Diagnoses or Management Options     Amount and/or Complexity of Data Reviewed  Tests in the radiology section of CPT®: reviewed    Risk of Complications, Morbidity, and/or Mortality  Presenting problems: low  Diagnostic procedures: low  Management options: low    Patient Progress  Patient progress: improved      Final diagnoses:   Acute pain of right shoulder   Fall, initial encounter       ED Disposition  ED Disposition     ED Disposition Condition Comment    Discharge Stable           Annita Adames, APRN  [address] [facility] [address]  [facility] [address]  [phone]    Schedule an appointment as soon as possible for a visit in 1 day           Medication List      New Prescriptions    cyclobenzaprine 10 MG tablet  Commonly known as: FLEXERIL  Take 1 tablet by mouth 3 (Three) Times a Day As Needed for Muscle Spasms.        Changed    * ibuprofen 800 MG tablet  Commonly known as: ADVIL,MOTRIN  Take 1 tablet by mouth Every 8 (Eight) Hours As Needed for Mild Pain  or Moderate Pain .  What changed: Another medication with the same name was added. Make sure you understand how and when to take each.     * ibuprofen 600 MG tablet  Commonly known as: ADVIL,MOTRIN  Take 1 tablet by mouth 3 (Three) Times a Day.  What changed: You were already taking a medication with the same name, and this prescription was added. Make sure you understand how and when to take each.         * This list has 2 medication(s) that are the same as other medications prescribed for you. Read the directions carefully, and ask your doctor or other care provider to review them with you.               Where to Get Your Medications      You can get these medications from any pharmacy    Bring a paper prescription for each of these medications  · cyclobenzaprine 10 MG  tablet  · ibuprofen 600 MG tablet          Karin Erazo PA  10/10/21 0888

## 2021-10-11 NOTE — ED NOTES
Report to Nima RN triage nurse, pt getting medical screening by provider at this time     Estephanie Kirkpatrick, RN  10/10/21 2034

## 2022-05-17 ENCOUNTER — APPOINTMENT (OUTPATIENT)
Dept: GENERAL RADIOLOGY | Facility: HOSPITAL | Age: 62
End: 2022-05-17

## 2022-05-17 ENCOUNTER — HOSPITAL ENCOUNTER (EMERGENCY)
Facility: HOSPITAL | Age: 62
Discharge: HOME | End: 2022-05-17
Attending: EMERGENCY MEDICINE | Admitting: EMERGENCY MEDICINE

## 2022-05-17 VITALS
SYSTOLIC BLOOD PRESSURE: 143 MMHG | BODY MASS INDEX: 28.32 KG/M2 | HEIGHT: 65 IN | HEART RATE: 98 BPM | RESPIRATION RATE: 18 BRPM | TEMPERATURE: 97.3 F | DIASTOLIC BLOOD PRESSURE: 99 MMHG | OXYGEN SATURATION: 100 % | WEIGHT: 170 LBS

## 2022-05-17 DIAGNOSIS — J20.9 ACUTE BRONCHITIS, UNSPECIFIED ORGANISM: ICD-10-CM

## 2022-05-17 DIAGNOSIS — R07.81 PLEURITIC CHEST PAIN: Primary | ICD-10-CM

## 2022-05-17 LAB
A-A DO2: 30.2 MMHG (ref 0–300)
ALBUMIN SERPL-MCNC: 3.78 G/DL (ref 3.5–5.2)
ALBUMIN/GLOB SERPL: 1.1 G/DL
ALP SERPL-CCNC: 103 U/L (ref 39–117)
ALT SERPL W P-5'-P-CCNC: 19 U/L (ref 1–33)
ANION GAP SERPL CALCULATED.3IONS-SCNC: 12 MMOL/L (ref 5–15)
ANISOCYTOSIS BLD QL: NORMAL
ARTERIAL PATENCY WRIST A: ABNORMAL
AST SERPL-CCNC: 31 U/L (ref 1–32)
ATMOSPHERIC PRESS: 727 MMHG
BASE EXCESS BLDA CALC-SCNC: 1.4 MMOL/L (ref 0–2)
BASOPHILS # BLD AUTO: 0.04 10*3/MM3 (ref 0–0.2)
BASOPHILS NFR BLD AUTO: 0.6 % (ref 0–1.5)
BDY SITE: ABNORMAL
BILIRUB SERPL-MCNC: 0.5 MG/DL (ref 0–1.2)
BODY TEMPERATURE: 0 C
BUN SERPL-MCNC: 9 MG/DL (ref 8–23)
BUN/CREAT SERPL: 13.2 (ref 7–25)
CALCIUM SPEC-SCNC: 9.3 MG/DL (ref 8.6–10.5)
CHLORIDE SERPL-SCNC: 101 MMOL/L (ref 98–107)
CO2 BLDA-SCNC: 25 MMOL/L (ref 22–33)
CO2 SERPL-SCNC: 20 MMOL/L (ref 22–29)
COHGB MFR BLD: 0.9 % (ref 0–5)
CREAT SERPL-MCNC: 0.68 MG/DL (ref 0.57–1)
DEPRECATED RDW RBC AUTO: 58.8 FL (ref 37–54)
EGFRCR SERPLBLD CKD-EPI 2021: 99.8 ML/MIN/1.73
EOSINOPHIL # BLD AUTO: 0.02 10*3/MM3 (ref 0–0.4)
EOSINOPHIL NFR BLD AUTO: 0.3 % (ref 0.3–6.2)
ERYTHROCYTE [DISTWIDTH] IN BLOOD BY AUTOMATED COUNT: 21.4 % (ref 12.3–15.4)
GLOBULIN UR ELPH-MCNC: 3.3 GM/DL
GLUCOSE SERPL-MCNC: 115 MG/DL (ref 65–99)
HCO3 BLDA-SCNC: 24.1 MMOL/L (ref 20–26)
HCT VFR BLD AUTO: 41.8 % (ref 34–46.6)
HCT VFR BLD CALC: 33.2 % (ref 38–51)
HGB BLD-MCNC: 11.6 G/DL (ref 12–15.9)
HGB BLDA-MCNC: 10.8 G/DL (ref 13.5–17.5)
HYPOCHROMIA BLD QL: NORMAL
IMM GRANULOCYTES # BLD AUTO: 0.05 10*3/MM3 (ref 0–0.05)
IMM GRANULOCYTES NFR BLD AUTO: 0.7 % (ref 0–0.5)
INHALED O2 CONCENTRATION: 21 %
LYMPHOCYTES # BLD AUTO: 0.98 10*3/MM3 (ref 0.7–3.1)
LYMPHOCYTES NFR BLD AUTO: 13.5 % (ref 19.6–45.3)
Lab: ABNORMAL
MCH RBC QN AUTO: 22 PG (ref 26.6–33)
MCHC RBC AUTO-ENTMCNC: 27.8 G/DL (ref 31.5–35.7)
MCV RBC AUTO: 79.3 FL (ref 79–97)
METHGB BLD QL: 0 % (ref 0–3)
MICROCYTES BLD QL: NORMAL
MODALITY: ABNORMAL
MONOCYTES # BLD AUTO: 0.61 10*3/MM3 (ref 0.1–0.9)
MONOCYTES NFR BLD AUTO: 8.4 % (ref 5–12)
NEUTROPHILS NFR BLD AUTO: 5.54 10*3/MM3 (ref 1.7–7)
NEUTROPHILS NFR BLD AUTO: 76.5 % (ref 42.7–76)
NOTE: ABNORMAL
NRBC BLD AUTO-RTO: 0 /100 WBC (ref 0–0.2)
OXYHGB MFR BLDV: 95.5 % (ref 94–99)
PCO2 BLDA: 30.6 MM HG (ref 35–45)
PCO2 TEMP ADJ BLD: ABNORMAL MM[HG]
PH BLDA: 7.5 PH UNITS (ref 7.35–7.45)
PH, TEMP CORRECTED: ABNORMAL
PLAT MORPH BLD: NORMAL
PLATELET # BLD AUTO: 346 10*3/MM3 (ref 140–450)
PMV BLD AUTO: 9 FL (ref 6–12)
PO2 BLDA: 78 MM HG (ref 83–108)
PO2 TEMP ADJ BLD: ABNORMAL MM[HG]
POTASSIUM SERPL-SCNC: 3.8 MMOL/L (ref 3.5–5.2)
PROT SERPL-MCNC: 7.1 G/DL (ref 6–8.5)
RBC # BLD AUTO: 5.27 10*6/MM3 (ref 3.77–5.28)
SAO2 % BLDCOA: 96.4 % (ref 94–99)
SODIUM SERPL-SCNC: 133 MMOL/L (ref 136–145)
TROPONIN T SERPL-MCNC: <0.01 NG/ML (ref 0–0.03)
TROPONIN T SERPL-MCNC: <0.01 NG/ML (ref 0–0.03)
VENTILATOR MODE: ABNORMAL
WBC NRBC COR # BLD: 7.24 10*3/MM3 (ref 3.4–10.8)

## 2022-05-17 PROCEDURE — 85007 BL SMEAR W/DIFF WBC COUNT: CPT | Performed by: EMERGENCY MEDICINE

## 2022-05-17 PROCEDURE — 85025 COMPLETE CBC W/AUTO DIFF WBC: CPT | Performed by: EMERGENCY MEDICINE

## 2022-05-17 PROCEDURE — 82375 ASSAY CARBOXYHB QUANT: CPT

## 2022-05-17 PROCEDURE — 99284 EMERGENCY DEPT VISIT MOD MDM: CPT

## 2022-05-17 PROCEDURE — 87040 BLOOD CULTURE FOR BACTERIA: CPT | Performed by: EMERGENCY MEDICINE

## 2022-05-17 PROCEDURE — 83050 HGB METHEMOGLOBIN QUAN: CPT

## 2022-05-17 PROCEDURE — 36415 COLL VENOUS BLD VENIPUNCTURE: CPT

## 2022-05-17 PROCEDURE — 36600 WITHDRAWAL OF ARTERIAL BLOOD: CPT

## 2022-05-17 PROCEDURE — 82805 BLOOD GASES W/O2 SATURATION: CPT

## 2022-05-17 PROCEDURE — 93010 ELECTROCARDIOGRAM REPORT: CPT | Performed by: INTERNAL MEDICINE

## 2022-05-17 PROCEDURE — 71045 X-RAY EXAM CHEST 1 VIEW: CPT

## 2022-05-17 PROCEDURE — 93005 ELECTROCARDIOGRAM TRACING: CPT | Performed by: EMERGENCY MEDICINE

## 2022-05-17 PROCEDURE — 25010000002 KETOROLAC TROMETHAMINE PER 15 MG: Performed by: EMERGENCY MEDICINE

## 2022-05-17 PROCEDURE — 71045 X-RAY EXAM CHEST 1 VIEW: CPT | Performed by: RADIOLOGY

## 2022-05-17 PROCEDURE — 80053 COMPREHEN METABOLIC PANEL: CPT | Performed by: EMERGENCY MEDICINE

## 2022-05-17 PROCEDURE — 96372 THER/PROPH/DIAG INJ SC/IM: CPT

## 2022-05-17 PROCEDURE — 84484 ASSAY OF TROPONIN QUANT: CPT | Performed by: EMERGENCY MEDICINE

## 2022-05-17 RX ORDER — GUAIFENESIN AND CODEINE PHOSPHATE 100; 10 MG/5ML; MG/5ML
5 SOLUTION ORAL 4 TIMES DAILY PRN
Qty: 120 ML | Refills: 0 | Status: SHIPPED | OUTPATIENT
Start: 2022-05-17 | End: 2023-02-03

## 2022-05-17 RX ORDER — KETOROLAC TROMETHAMINE 30 MG/ML
60 INJECTION, SOLUTION INTRAMUSCULAR; INTRAVENOUS ONCE
Status: COMPLETED | OUTPATIENT
Start: 2022-05-17 | End: 2022-05-17

## 2022-05-17 RX ORDER — IBUPROFEN 600 MG/1
600 TABLET ORAL EVERY 6 HOURS PRN
Qty: 30 TABLET | Refills: 0 | Status: SHIPPED | OUTPATIENT
Start: 2022-05-17 | End: 2023-02-03

## 2022-05-17 RX ORDER — SODIUM CHLORIDE 0.9 % (FLUSH) 0.9 %
10 SYRINGE (ML) INJECTION AS NEEDED
Status: DISCONTINUED | OUTPATIENT
Start: 2022-05-17 | End: 2022-05-17 | Stop reason: HOSPADM

## 2022-05-17 RX ADMIN — KETOROLAC TROMETHAMINE 60 MG: 30 INJECTION, SOLUTION INTRAMUSCULAR at 18:34

## 2022-05-17 NOTE — ED PROVIDER NOTES
Subjective   62-year-old white female complains of chest pain.  Patient started with right ear pain 3 to 4 days ago.  She then began to have nonproductive cough, and today had right-sided chest pain.  The pain is worse with cough or deep breathing.  She denies any nausea, diaphoresis, radiation of the pain.  She has not had any fever, chills, leg pain or other complaints.          Review of Systems   All other systems reviewed and are negative.      Past Medical History:   Diagnosis Date   • Shoulder fracture        Allergies   Allergen Reactions   • Vancomycin Rash       Past Surgical History:   Procedure Laterality Date   • BLADDER REPAIR     • CHOLECYSTECTOMY     • GASTRIC BYPASS     • HYSTERECTOMY     • LASIK     • LUMBAR DISC SURGERY      L5 fusion   • ORIF HUMERUS FRACTURE Right 8/30/2020    Procedure: RIGHT SHOULDER HUMERUS PROXIMAL OPEN REDUCTION INTERNAL FIXATION WITH PLATE AND SCREW;  Surgeon: Eleazar Núñez MD;  Location: [facility];  Service: Orthopedics;  Laterality: Right;   • TOTAL SHOULDER REPLACEMENT      right        Family History   Problem Relation Age of Onset   • Stomach cancer Mother    • No Known Problems Father        Social History     Socioeconomic History   • Marital status:    Tobacco Use   • Smoking status: Never Smoker   • Smokeless tobacco: Never Used   Substance and Sexual Activity   • Alcohol use: No   • Drug use: No   • Sexual activity: Defer           Objective   Physical Exam  Vitals and nursing note reviewed. Exam conducted with a chaperone present.   Constitutional:       General: She is not in acute distress.     Appearance: Normal appearance. She is normal weight. She is not diaphoretic.   HENT:      Head: Normocephalic and atraumatic.      Right Ear: A middle ear effusion (Mild serous effusion) is present.      Left Ear: Tympanic membrane normal.      Mouth/Throat:      Mouth: Mucous membranes are moist.      Pharynx: Oropharynx is clear.   Neck:      Vascular: No  JVD.      Trachea: No tracheal deviation.   Cardiovascular:      Rate and Rhythm: Normal rate and regular rhythm.  No extrasystoles are present.     Heart sounds: Normal heart sounds. No murmur heard.    No friction rub. No gallop.   Pulmonary:      Effort: Pulmonary effort is normal. No respiratory distress.      Breath sounds: Normal breath sounds. No wheezing or rales.   Chest:      Chest wall: No tenderness.   Abdominal:      General: Bowel sounds are normal.      Palpations: Abdomen is soft.      Tenderness: There is no abdominal tenderness.   Musculoskeletal:         General: Normal range of motion.      Cervical back: Neck supple.      Right lower leg: No edema.      Left lower leg: No edema.   Skin:     General: Skin is warm and dry.      Coloration: Skin is not pale.   Neurological:      Mental Status: She is alert and oriented to person, place, and time.   Psychiatric:         Mood and Affect: Mood normal.         Behavior: Behavior normal.         Procedures  Results for orders placed or performed during the hospital encounter of 05/17/22   Comprehensive Metabolic Panel    Specimen: Arm, Right; Blood   Result Value Ref Range    Glucose 115 (H) 65 - 99 mg/dL    BUN 9 8 - 23 mg/dL    Creatinine 0.68 0.57 - 1.00 mg/dL    Sodium 133 (L) 136 - 145 mmol/L    Potassium 3.8 3.5 - 5.2 mmol/L    Chloride 101 98 - 107 mmol/L    CO2 20.0 (L) 22.0 - 29.0 mmol/L    Calcium 9.3 8.6 - 10.5 mg/dL    Total Protein 7.1 6.0 - 8.5 g/dL    Albumin 3.78 3.50 - 5.20 g/dL    ALT (SGPT) 19 1 - 33 U/L    AST (SGOT) 31 1 - 32 U/L    Alkaline Phosphatase 103 39 - 117 U/L    Total Bilirubin 0.5 0.0 - 1.2 mg/dL    Globulin 3.3 gm/dL    A/G Ratio 1.1 g/dL    BUN/Creatinine Ratio 13.2 7.0 - 25.0    Anion Gap 12.0 5.0 - 15.0 mmol/L    eGFR 99.8 >60.0 mL/min/1.73   Troponin    Specimen: Arm, Right; Blood   Result Value Ref Range    Troponin T <0.010 0.000 - 0.030 ng/mL   CBC Auto Differential    Specimen: Arm, Right; Blood   Result Value  Ref Range    WBC 7.24 3.40 - 10.80 10*3/mm3    RBC 5.27 3.77 - 5.28 10*6/mm3    Hemoglobin 11.6 (L) 12.0 - 15.9 g/dL    Hematocrit 41.8 34.0 - 46.6 %    MCV 79.3 79.0 - 97.0 fL    MCH 22.0 (L) 26.6 - 33.0 pg    MCHC 27.8 (L) 31.5 - 35.7 g/dL    RDW 21.4 (H) 12.3 - 15.4 %    RDW-SD 58.8 (H) 37.0 - 54.0 fl    MPV 9.0 6.0 - 12.0 fL    Platelets 346 140 - 450 10*3/mm3    Neutrophil % 76.5 (H) 42.7 - 76.0 %    Lymphocyte % 13.5 (L) 19.6 - 45.3 %    Monocyte % 8.4 5.0 - 12.0 %    Eosinophil % 0.3 0.3 - 6.2 %    Basophil % 0.6 0.0 - 1.5 %    Immature Grans % 0.7 (H) 0.0 - 0.5 %    Neutrophils, Absolute 5.54 1.70 - 7.00 10*3/mm3    Lymphocytes, Absolute 0.98 0.70 - 3.10 10*3/mm3    Monocytes, Absolute 0.61 0.10 - 0.90 10*3/mm3    Eosinophils, Absolute 0.02 0.00 - 0.40 10*3/mm3    Basophils, Absolute 0.04 0.00 - 0.20 10*3/mm3    Immature Grans, Absolute 0.05 0.00 - 0.05 10*3/mm3    nRBC 0.0 0.0 - 0.2 /100 WBC   Blood Gas, Arterial With Co-Ox    Specimen: Arterial Blood   Result Value Ref Range    Site Right Brachial     Elvis's Test N/A     pH, Arterial 7.504 (H) 7.350 - 7.450 pH units    pCO2, Arterial 30.6 (L) 35.0 - 45.0 mm Hg    pO2, Arterial 78.0 (L) 83.0 - 108.0 mm Hg    HCO3, Arterial 24.1 20.0 - 26.0 mmol/L    Base Excess, Arterial 1.4 0.0 - 2.0 mmol/L    O2 Saturation, Arterial 96.4 94.0 - 99.0 %    Hemoglobin, Blood Gas 10.8 (L) 13.5 - 17.5 g/dL    Hematocrit, Blood Gas 33.2 (L) 38.0 - 51.0 %    Oxyhemoglobin 95.5 94 - 99 %    Methemoglobin 0.00 0.00 - 3.00 %    Carboxyhemoglobin 0.9 0 - 5 %    A-a DO2 30.2 0.0 - 300.0 mmHg    CO2 Content 25.0 22 - 33 mmol/L    Temperature 0.0 C    Barometric Pressure for Blood Gas 727 mmHg    Modality Room Air     FIO2 21 %    Ventilator Mode NA     Note      Collected by [id number]     pH, Temp Corrected      pCO2, Temperature Corrected      pO2, Temperature Corrected     Scan Slide    Specimen: Arm, Right; Blood   Result Value Ref Range    Anisocytosis Mod/2+ None Seen     Hypochromia Mod/2+ None Seen    Microcytes Slight/1+ None Seen    Platelet Morphology Normal Normal     XR Chest 1 View    Result Date: 5/17/2022  Narrative: XR CHEST 1 VW-  CLINICAL INDICATION: cp   COMPARISON: 09/11/2020  TECHNIQUE: Single frontal view of the chest.  FINDINGS:  LUNGS: Lungs are adequately aerated.  HEART AND MEDIASTINUM: Heart and mediastinal contours are unremarkable   SKELETON: Bony and soft tissue structures are unremarkable.        Impression: No radiographic evidence of acute cardiac or pulmonary disease.  This report was finalized on 5/17/2022 4:15 PM by Dr. Radames Chen MD.               ED Course  ED Course as of 05/17/22 1832   Tue May 17, 2022   1556 ECG 12 Lead  Sinus tachycardia with frequent PVCs in a pattern of trigeminy.  Rate 127.  Normal axis.  Normal QT interval.  No acute ischemic changes.  Abnormal EKG.  Interpreted by me.  Normal  Electronically signed by Ismael Oliveira MD, 05/17/22, 3:57 PM EDT.   [BC]   1602 EKG interpretation: Sinus rhythm with frequent PVCs.  The patient is in bigeminy for much of this tracing.  There are no ischemic changes noted. [DS]      ED Course User Index  [BC] Ismael Oliveira MD  [DS] Michel Garvey MD                                               HEART Score (for prediction of 6-week risk of major adverse cardiac event) reviewed and/or performed as part of the patient evaluation and treatment planning process.  The result associated with this review/performance is: 1       MDM  Number of Diagnoses or Management Options     Amount and/or Complexity of Data Reviewed  Clinical lab tests: reviewed  Tests in the radiology section of CPT®: reviewed  Tests in the medicine section of CPT®: reviewed    Risk of Complications, Morbidity, and/or Mortality  Presenting problems: high  Diagnostic procedures: moderate  Management options: moderate        Final diagnoses:   Pleuritic chest pain   Acute bronchitis, unspecified organism       ED  Disposition  ED Disposition     ED Disposition   Discharge    Condition   Stable    Comment   --             Annita Adames, APRN  [address]  [address]  [place] [address]  [phone]    In 1 week           Medication List      New Prescriptions    guaiFENesin-codeine 100-10 MG/5ML liquid  Commonly known as: GUAIFENESIN AC  Take 5 mL by mouth 4 (Four) Times a Day As Needed for Cough.        Changed    ibuprofen 600 MG tablet  Commonly known as: ADVIL,MOTRIN  Take 1 tablet by mouth Every 6 (Six) Hours As Needed for Mild Pain .  What changed:   · when to take this  · reasons to take this  · Another medication with the same name was removed. Continue taking this medication, and follow the directions you see here.        Stop    cyclobenzaprine 10 MG tablet  Commonly known as: FLEXERIL     traMADol 50 MG tablet  Commonly known as: ULTRAM           Where to Get Your Medications      These medications were sent to [facility] [address] [place] [address] - [phone]  - [phone] [place] [id number]    Phone: [phone]   · guaiFENesin-codeine 100-10 MG/5ML liquid  · ibuprofen 600 MG tablet          Ismael Oliveira MD  05/17/22 3805

## 2022-05-18 LAB
QT INTERVAL: 296 MS
QT INTERVAL: 372 MS
QTC INTERVAL: 430 MS
QTC INTERVAL: 462 MS

## 2022-05-22 LAB
BACTERIA SPEC AEROBE CULT: NORMAL
BACTERIA SPEC AEROBE CULT: NORMAL

## 2022-10-12 ENCOUNTER — APPOINTMENT (OUTPATIENT)
Dept: GENERAL RADIOLOGY | Facility: HOSPITAL | Age: 62
End: 2022-10-12

## 2022-10-12 ENCOUNTER — HOSPITAL ENCOUNTER (EMERGENCY)
Facility: HOSPITAL | Age: 62
Discharge: HOME | End: 2022-10-12
Attending: EMERGENCY MEDICINE | Admitting: EMERGENCY MEDICINE

## 2022-10-12 VITALS
BODY MASS INDEX: 28.32 KG/M2 | TEMPERATURE: 98 F | WEIGHT: 170 LBS | DIASTOLIC BLOOD PRESSURE: 108 MMHG | OXYGEN SATURATION: 100 % | SYSTOLIC BLOOD PRESSURE: 170 MMHG | HEIGHT: 65 IN | HEART RATE: 113 BPM | RESPIRATION RATE: 18 BRPM

## 2022-10-12 DIAGNOSIS — S82.891A CLOSED FRACTURE OF RIGHT ANKLE, INITIAL ENCOUNTER: Primary | ICD-10-CM

## 2022-10-12 PROCEDURE — 73610 X-RAY EXAM OF ANKLE: CPT

## 2022-10-12 PROCEDURE — 73630 X-RAY EXAM OF FOOT: CPT

## 2022-10-12 PROCEDURE — 99283 EMERGENCY DEPT VISIT LOW MDM: CPT

## 2022-10-12 PROCEDURE — 73630 X-RAY EXAM OF FOOT: CPT | Performed by: RADIOLOGY

## 2022-10-12 PROCEDURE — 73610 X-RAY EXAM OF ANKLE: CPT | Performed by: RADIOLOGY

## 2022-10-12 RX ORDER — HYDROCODONE BITARTRATE AND ACETAMINOPHEN 5; 325 MG/1; MG/1
1 TABLET ORAL ONCE
Status: COMPLETED | OUTPATIENT
Start: 2022-10-12 | End: 2022-10-12

## 2022-10-12 RX ORDER — HYDROCODONE BITARTRATE AND ACETAMINOPHEN 5; 325 MG/1; MG/1
1 TABLET ORAL EVERY 6 HOURS PRN
Qty: 12 TABLET | Refills: 0 | Status: SHIPPED | OUTPATIENT
Start: 2022-10-12 | End: 2022-10-15

## 2022-10-12 RX ADMIN — HYDROCODONE BITARTRATE AND ACETAMINOPHEN 1 TABLET: 5; 325 TABLET ORAL at 19:50

## 2022-10-12 NOTE — DISCHARGE INSTRUCTIONS
Please remain in splint and utilize your crutches. Please utilize rest, ice, elevation and pain medication. Please follow up with ortho or return to ER if symptoms worsen.

## 2022-10-12 NOTE — ED PROVIDER NOTES
Subjective   History of Present Illness  This is a 62 year old female patient who presents to the ER with chief complaint of right ankle injury. 1 week ago, the patient fell into a hole that her dog had dug in her yard, twisting her right ankle. She has been utilize conservative treatments at home but unfortunately today, she woke up with increased pain and swelling in the right ankle and right foot. Pain aggravated by ambulating and weight bearing. No true alleviating factors. No numbness/tingling.         Review of Systems   Constitutional: Negative.  Negative for fever.   HENT: Negative.    Respiratory: Negative.    Cardiovascular: Negative.  Negative for chest pain.   Gastrointestinal: Negative.  Negative for abdominal pain.   Endocrine: Negative.    Genitourinary: Negative.  Negative for dysuria.   Musculoskeletal: Negative for arthralgias, back pain, gait problem, joint swelling, myalgias, neck pain and neck stiffness.        Right foot/ankle injury    Skin: Negative.    Neurological: Negative.    Psychiatric/Behavioral: Negative.    All other systems reviewed and are negative.      Past Medical History:   Diagnosis Date   • Shoulder fracture        Allergies   Allergen Reactions   • Vancomycin Rash       Past Surgical History:   Procedure Laterality Date   • BLADDER REPAIR     • CHOLECYSTECTOMY     • GASTRIC BYPASS     • HYSTERECTOMY     • LASIK     • LUMBAR DISC SURGERY      L5 fusion   • ORIF HUMERUS FRACTURE Right 8/30/2020    Procedure: RIGHT SHOULDER HUMERUS PROXIMAL OPEN REDUCTION INTERNAL FIXATION WITH PLATE AND SCREW;  Surgeon: Eleazar Núñez MD;  Location: [facility];  Service: Orthopedics;  Laterality: Right;   • TOTAL SHOULDER REPLACEMENT      right        Family History   Problem Relation Age of Onset   • Stomach cancer Mother    • No Known Problems Father        Social History     Socioeconomic History   • Marital status:    Tobacco Use   • Smoking status: Never   • Smokeless tobacco:  Never   Substance and Sexual Activity   • Alcohol use: No   • Drug use: No   • Sexual activity: Defer           Objective   Physical Exam  Vitals and nursing note reviewed.   Constitutional:       General: She is not in acute distress.     Appearance: She is well-developed. She is not diaphoretic.   HENT:      Head: Normocephalic and atraumatic.      Right Ear: External ear normal.      Left Ear: External ear normal.      Nose: Nose normal.   Eyes:      Conjunctiva/sclera: Conjunctivae normal.      Pupils: Pupils are equal, round, and reactive to light.   Neck:      Vascular: No JVD.      Trachea: No tracheal deviation.   Cardiovascular:      Rate and Rhythm: Normal rate and regular rhythm.      Heart sounds: Normal heart sounds. No murmur heard.  Pulmonary:      Effort: Pulmonary effort is normal. No respiratory distress.      Breath sounds: Normal breath sounds. No wheezing.   Abdominal:      General: Bowel sounds are normal.      Palpations: Abdomen is soft.      Tenderness: There is no abdominal tenderness.   Musculoskeletal:         General: Swelling, tenderness and signs of injury present. No deformity.      Cervical back: Normal range of motion and neck supple.      Comments: Right foot and ankle skin intact with no abrasion. Edema and bruising noted. Diffuse tenderness to palpation. Active but limited, painful ROM. Neurovascular status and sensation RLE intact.    Skin:     General: Skin is warm and dry.      Coloration: Skin is not pale.      Findings: No erythema or rash.   Neurological:      Mental Status: She is alert and oriented to person, place, and time.      Cranial Nerves: No cranial nerve deficit.   Psychiatric:         Behavior: Behavior normal.         Thought Content: Thought content normal.         Splint - Cast - Strapping    Date/Time: 10/12/2022 7:27 PM  Performed by: Ellie Dunlap PA  Authorized by: Darrell Kapoor MD     Consent:     Consent obtained:  Verbal    Consent  given by:  Patient    Risks, benefits, and alternatives were discussed: yes      Risks discussed:  Discoloration, numbness, pain and swelling    Alternatives discussed:  Referral, observation, alternative treatment, delayed treatment and no treatment  Universal protocol:     Imaging studies available: yes      Patient identity confirmed:  Verbally with patient and arm band  Pre-procedure details:     Distal neurologic exam:  Normal    Distal perfusion: distal pulses strong    Procedure details:     Location:  Ankle    Ankle location:  R ankle    Supplies:  Fiberglass    Attestation: Splint applied and adjusted personally by me    Post-procedure details:     Distal neurologic exam:  Normal    Distal perfusion: distal pulses strong      Procedure completion:  Tolerated    Post-procedure imaging: not applicable                 ED Course  ED Course as of 10/12/22 1933   Wed Oct 12, 2022   1855 XR Foot 3+ View Right  IMPRESSION:    No acute findings in the right foot.     This report was finalized on 10/12/2022 6:40 PM by Dr. Radames Chen MD [MM]   1855 XR Ankle 3+ View Right  IMPRESSION:    Extensive soft tissue swelling laterally which appears to be  associated with a nondisplaced lateral malleolar fracture.     This report was finalized on 10/12/2022 6:39 PM by Dr. Radames Chen MD [MM]   1928 Patient diagnosed with right ankle fracture. Will be d/c home in splint. She already has crutches at home. Will be given rx for norco. Will f/u with ortho or return to ER if symptoms worsen.  [MM]      ED Course User Index  [MM] Ellie uDnlap PA                                           MDM  Number of Diagnoses or Management Options     Amount and/or Complexity of Data Reviewed  Tests in the radiology section of CPT®: ordered and reviewed        Final diagnoses:   Closed fracture of right ankle, initial encounter       ED Disposition  ED Disposition     ED Disposition   Discharge    Condition   Stable    Comment   --              Theodore Hewitt, DO  [address] [place] [address] [phone]    In 2 days           Medication List      No changes were made to your prescriptions during this visit.          Ellie Dunlap PA  10/12/22 1933

## 2022-11-13 ENCOUNTER — APPOINTMENT (OUTPATIENT)
Dept: GENERAL RADIOLOGY | Facility: HOSPITAL | Age: 62
End: 2022-11-13

## 2022-11-13 ENCOUNTER — APPOINTMENT (OUTPATIENT)
Dept: CT IMAGING | Facility: HOSPITAL | Age: 62
End: 2022-11-13

## 2022-11-13 ENCOUNTER — HOSPITAL ENCOUNTER (EMERGENCY)
Facility: HOSPITAL | Age: 62
Discharge: HOME | End: 2022-11-13
Attending: FAMILY MEDICINE | Admitting: STUDENT IN AN ORGANIZED HEALTH CARE EDUCATION/TRAINING PROGRAM

## 2022-11-13 VITALS
RESPIRATION RATE: 16 BRPM | TEMPERATURE: 98.9 F | SYSTOLIC BLOOD PRESSURE: 200 MMHG | OXYGEN SATURATION: 96 % | DIASTOLIC BLOOD PRESSURE: 90 MMHG | BODY MASS INDEX: 30.82 KG/M2 | HEART RATE: 112 BPM | HEIGHT: 65 IN | WEIGHT: 185 LBS

## 2022-11-13 DIAGNOSIS — K76.0 HEPATIC STEATOSIS: ICD-10-CM

## 2022-11-13 DIAGNOSIS — E27.8 ADRENAL NODULE: ICD-10-CM

## 2022-11-13 DIAGNOSIS — N30.00 ACUTE CYSTITIS WITHOUT HEMATURIA: ICD-10-CM

## 2022-11-13 DIAGNOSIS — K20.90 ESOPHAGITIS: Primary | ICD-10-CM

## 2022-11-13 DIAGNOSIS — K56.7 ILEUS: ICD-10-CM

## 2022-11-13 DIAGNOSIS — I10 HYPERTENSION, UNSPECIFIED TYPE: ICD-10-CM

## 2022-11-13 LAB
A-A DO2: 24.2 MMHG (ref 0–300)
ABO GROUP BLD: NORMAL
ALBUMIN SERPL-MCNC: 3.9 G/DL (ref 3.5–5.2)
ALBUMIN/GLOB SERPL: 1.1 G/DL
ALP SERPL-CCNC: 110 U/L (ref 39–117)
ALT SERPL W P-5'-P-CCNC: 80 U/L (ref 1–33)
ANION GAP SERPL CALCULATED.3IONS-SCNC: 15.9 MMOL/L (ref 5–15)
ANISOCYTOSIS BLD QL: NORMAL
APTT PPP: 28.4 SECONDS (ref 26.5–34.5)
ARTERIAL PATENCY WRIST A: POSITIVE
AST SERPL-CCNC: 84 U/L (ref 1–32)
ATMOSPHERIC PRESS: 731 MMHG
BACTERIA UR QL AUTO: ABNORMAL /HPF
BASE EXCESS BLDA CALC-SCNC: -0.3 MMOL/L (ref 0–2)
BASOPHILS # BLD AUTO: 0.03 10*3/MM3 (ref 0–0.2)
BASOPHILS NFR BLD AUTO: 0.5 % (ref 0–1.5)
BDY SITE: ABNORMAL
BILIRUB SERPL-MCNC: 0.8 MG/DL (ref 0–1.2)
BILIRUB UR QL STRIP: NEGATIVE
BLD GP AB SCN SERPL QL: NEGATIVE
BODY TEMPERATURE: 0 C
BUN SERPL-MCNC: 7 MG/DL (ref 8–23)
BUN/CREAT SERPL: 10.4 (ref 7–25)
CALCIUM SPEC-SCNC: 9.4 MG/DL (ref 8.6–10.5)
CHLORIDE SERPL-SCNC: 97 MMOL/L (ref 98–107)
CLARITY UR: CLEAR
CO2 BLDA-SCNC: 22.3 MMOL/L (ref 22–33)
CO2 SERPL-SCNC: 21.1 MMOL/L (ref 22–29)
COHGB MFR BLD: 1.1 % (ref 0–5)
COLOR UR: YELLOW
CREAT SERPL-MCNC: 0.67 MG/DL (ref 0.57–1)
D-LACTATE SERPL-SCNC: 0.9 MMOL/L (ref 0.5–2)
D-LACTATE SERPL-SCNC: 2.2 MMOL/L (ref 0.5–2)
DEPRECATED RDW RBC AUTO: 60.4 FL (ref 37–54)
EGFRCR SERPLBLD CKD-EPI 2021: 100.2 ML/MIN/1.73
EOSINOPHIL # BLD AUTO: 0.01 10*3/MM3 (ref 0–0.4)
EOSINOPHIL NFR BLD AUTO: 0.2 % (ref 0.3–6.2)
ERYTHROCYTE [DISTWIDTH] IN BLOOD BY AUTOMATED COUNT: 22.6 % (ref 12.3–15.4)
FLUAV RNA RESP QL NAA+PROBE: NOT DETECTED
FLUBV RNA RESP QL NAA+PROBE: NOT DETECTED
GLOBULIN UR ELPH-MCNC: 3.4 GM/DL
GLUCOSE SERPL-MCNC: 124 MG/DL (ref 65–99)
GLUCOSE UR STRIP-MCNC: NEGATIVE MG/DL
HCO3 BLDA-SCNC: 21.5 MMOL/L (ref 20–26)
HCT VFR BLD AUTO: 33.2 % (ref 34–46.6)
HCT VFR BLD AUTO: 36.6 % (ref 34–46.6)
HCT VFR BLD CALC: 33.6 % (ref 38–51)
HGB BLD-MCNC: 10.9 G/DL (ref 12–15.9)
HGB BLD-MCNC: 9.9 G/DL (ref 12–15.9)
HGB BLDA-MCNC: 11 G/DL (ref 13.5–17.5)
HGB UR QL STRIP.AUTO: NEGATIVE
HOLD SPECIMEN: NORMAL
HOLD SPECIMEN: NORMAL
HYALINE CASTS UR QL AUTO: ABNORMAL /LPF
HYPOCHROMIA BLD QL: NORMAL
IMM GRANULOCYTES # BLD AUTO: 0.02 10*3/MM3 (ref 0–0.05)
IMM GRANULOCYTES NFR BLD AUTO: 0.3 % (ref 0–0.5)
INHALED O2 CONCENTRATION: 21 %
INR PPP: 0.96 (ref 0.9–1.1)
KETONES UR QL STRIP: ABNORMAL
LEUKOCYTE ESTERASE UR QL STRIP.AUTO: ABNORMAL
LIPASE SERPL-CCNC: 155 U/L (ref 13–60)
LYMPHOCYTES # BLD AUTO: 0.48 10*3/MM3 (ref 0.7–3.1)
LYMPHOCYTES NFR BLD AUTO: 8 % (ref 19.6–45.3)
Lab: ABNORMAL
MCH RBC QN AUTO: 22.8 PG (ref 26.6–33)
MCHC RBC AUTO-ENTMCNC: 29.8 G/DL (ref 31.5–35.7)
MCV RBC AUTO: 76.4 FL (ref 79–97)
METHGB BLD QL: 0.2 % (ref 0–3)
MICROCYTES BLD QL: NORMAL
MODALITY: ABNORMAL
MONOCYTES # BLD AUTO: 0.64 10*3/MM3 (ref 0.1–0.9)
MONOCYTES NFR BLD AUTO: 10.7 % (ref 5–12)
NEUTROPHILS NFR BLD AUTO: 4.79 10*3/MM3 (ref 1.7–7)
NEUTROPHILS NFR BLD AUTO: 80.3 % (ref 42.7–76)
NITRITE UR QL STRIP: NEGATIVE
NOTE: ABNORMAL
NOTIFIED BY: ABNORMAL
NOTIFIED WHO: ABNORMAL
NRBC BLD AUTO-RTO: 0 /100 WBC (ref 0–0.2)
OXYHGB MFR BLDV: 96.5 % (ref 94–99)
PCO2 BLDA: 26 MM HG (ref 35–45)
PCO2 TEMP ADJ BLD: ABNORMAL MM[HG]
PH BLDA: 7.53 PH UNITS (ref 7.35–7.45)
PH UR STRIP.AUTO: 7 [PH] (ref 5–8)
PH, TEMP CORRECTED: ABNORMAL
PLAT MORPH BLD: NORMAL
PLATELET # BLD AUTO: 319 10*3/MM3 (ref 140–450)
PMV BLD AUTO: 9.1 FL (ref 6–12)
PO2 BLDA: 90 MM HG (ref 83–108)
PO2 TEMP ADJ BLD: ABNORMAL MM[HG]
POTASSIUM SERPL-SCNC: 4.1 MMOL/L (ref 3.5–5.2)
PROCALCITONIN SERPL-MCNC: 0.09 NG/ML (ref 0–0.25)
PROT SERPL-MCNC: 7.3 G/DL (ref 6–8.5)
PROT UR QL STRIP: NEGATIVE
PROTHROMBIN TIME: 13 SECONDS (ref 12.1–14.7)
QT INTERVAL: 358 MS
QTC INTERVAL: 477 MS
RBC # BLD AUTO: 4.79 10*6/MM3 (ref 3.77–5.28)
RBC # UR STRIP: ABNORMAL /HPF
REF LAB TEST METHOD: ABNORMAL
RH BLD: POSITIVE
SAO2 % BLDCOA: 97.8 % (ref 94–99)
SARS-COV-2 RNA RESP QL NAA+PROBE: NOT DETECTED
SODIUM SERPL-SCNC: 134 MMOL/L (ref 136–145)
SP GR UR STRIP: 1.02 (ref 1–1.03)
SQUAMOUS #/AREA URNS HPF: ABNORMAL /HPF
T&S EXPIRATION DATE: NORMAL
TROPONIN T SERPL-MCNC: <0.01 NG/ML (ref 0–0.03)
UROBILINOGEN UR QL STRIP: ABNORMAL
VENTILATOR MODE: ABNORMAL
WBC # UR STRIP: ABNORMAL /HPF
WBC NRBC COR # BLD: 5.97 10*3/MM3 (ref 3.4–10.8)
WHOLE BLOOD HOLD COAG: NORMAL
WHOLE BLOOD HOLD SPECIMEN: NORMAL

## 2022-11-13 PROCEDURE — 83605 ASSAY OF LACTIC ACID: CPT | Performed by: FAMILY MEDICINE

## 2022-11-13 PROCEDURE — 85730 THROMBOPLASTIN TIME PARTIAL: CPT | Performed by: FAMILY MEDICINE

## 2022-11-13 PROCEDURE — 85007 BL SMEAR W/DIFF WBC COUNT: CPT | Performed by: FAMILY MEDICINE

## 2022-11-13 PROCEDURE — 86900 BLOOD TYPING SEROLOGIC ABO: CPT | Performed by: FAMILY MEDICINE

## 2022-11-13 PROCEDURE — 86901 BLOOD TYPING SEROLOGIC RH(D): CPT | Performed by: FAMILY MEDICINE

## 2022-11-13 PROCEDURE — 85014 HEMATOCRIT: CPT | Performed by: STUDENT IN AN ORGANIZED HEALTH CARE EDUCATION/TRAINING PROGRAM

## 2022-11-13 PROCEDURE — 85018 HEMOGLOBIN: CPT | Performed by: STUDENT IN AN ORGANIZED HEALTH CARE EDUCATION/TRAINING PROGRAM

## 2022-11-13 PROCEDURE — 71045 X-RAY EXAM CHEST 1 VIEW: CPT

## 2022-11-13 PROCEDURE — 93010 ELECTROCARDIOGRAM REPORT: CPT | Performed by: INTERNAL MEDICINE

## 2022-11-13 PROCEDURE — 25010000002 ONDANSETRON PER 1 MG: Performed by: FAMILY MEDICINE

## 2022-11-13 PROCEDURE — 0 IOPAMIDOL PER 1 ML: Performed by: FAMILY MEDICINE

## 2022-11-13 PROCEDURE — 85025 COMPLETE CBC W/AUTO DIFF WBC: CPT | Performed by: FAMILY MEDICINE

## 2022-11-13 PROCEDURE — 85610 PROTHROMBIN TIME: CPT | Performed by: FAMILY MEDICINE

## 2022-11-13 PROCEDURE — 74177 CT ABD & PELVIS W/CONTRAST: CPT

## 2022-11-13 PROCEDURE — 83050 HGB METHEMOGLOBIN QUAN: CPT

## 2022-11-13 PROCEDURE — P9612 CATHETERIZE FOR URINE SPEC: HCPCS

## 2022-11-13 PROCEDURE — 99284 EMERGENCY DEPT VISIT MOD MDM: CPT

## 2022-11-13 PROCEDURE — 86850 RBC ANTIBODY SCREEN: CPT | Performed by: FAMILY MEDICINE

## 2022-11-13 PROCEDURE — 96374 THER/PROPH/DIAG INJ IV PUSH: CPT

## 2022-11-13 PROCEDURE — 87636 SARSCOV2 & INF A&B AMP PRB: CPT | Performed by: FAMILY MEDICINE

## 2022-11-13 PROCEDURE — 81001 URINALYSIS AUTO W/SCOPE: CPT | Performed by: FAMILY MEDICINE

## 2022-11-13 PROCEDURE — 63710000001 ONDANSETRON ODT 4 MG TABLET DISPERSIBLE: Performed by: STUDENT IN AN ORGANIZED HEALTH CARE EDUCATION/TRAINING PROGRAM

## 2022-11-13 PROCEDURE — 84484 ASSAY OF TROPONIN QUANT: CPT | Performed by: FAMILY MEDICINE

## 2022-11-13 PROCEDURE — 83690 ASSAY OF LIPASE: CPT | Performed by: FAMILY MEDICINE

## 2022-11-13 PROCEDURE — 36415 COLL VENOUS BLD VENIPUNCTURE: CPT

## 2022-11-13 PROCEDURE — 84145 PROCALCITONIN (PCT): CPT | Performed by: FAMILY MEDICINE

## 2022-11-13 PROCEDURE — 93005 ELECTROCARDIOGRAM TRACING: CPT | Performed by: FAMILY MEDICINE

## 2022-11-13 PROCEDURE — 80053 COMPREHEN METABOLIC PANEL: CPT | Performed by: FAMILY MEDICINE

## 2022-11-13 PROCEDURE — 82375 ASSAY CARBOXYHB QUANT: CPT

## 2022-11-13 PROCEDURE — 87040 BLOOD CULTURE FOR BACTERIA: CPT | Performed by: FAMILY MEDICINE

## 2022-11-13 PROCEDURE — 82805 BLOOD GASES W/O2 SATURATION: CPT

## 2022-11-13 PROCEDURE — 36600 WITHDRAWAL OF ARTERIAL BLOOD: CPT

## 2022-11-13 RX ORDER — PHENOBARBITAL, HYOSCYAMINE SULFATE, ATROPINE SULFATE AND SCOPOLAMINE HYDROBROMIDE .0194; .1037; 16.2; .0065 MG/1; MG/1; MG/1; MG/1
2 TABLET ORAL ONCE
Status: COMPLETED | OUTPATIENT
Start: 2022-11-13 | End: 2022-11-13

## 2022-11-13 RX ORDER — NITROFURANTOIN 25; 75 MG/1; MG/1
100 CAPSULE ORAL 2 TIMES DAILY
Qty: 10 CAPSULE | Refills: 0 | Status: SHIPPED | OUTPATIENT
Start: 2022-11-13 | End: 2022-11-18

## 2022-11-13 RX ORDER — ONDANSETRON 2 MG/ML
4 INJECTION INTRAMUSCULAR; INTRAVENOUS ONCE
Status: COMPLETED | OUTPATIENT
Start: 2022-11-13 | End: 2022-11-13

## 2022-11-13 RX ORDER — ONDANSETRON 4 MG/1
4 TABLET, ORALLY DISINTEGRATING ORAL ONCE
Status: COMPLETED | OUTPATIENT
Start: 2022-11-13 | End: 2022-11-13

## 2022-11-13 RX ORDER — PANTOPRAZOLE SODIUM 40 MG/10ML
40 INJECTION, POWDER, LYOPHILIZED, FOR SOLUTION INTRAVENOUS ONCE
Status: DISCONTINUED | OUTPATIENT
Start: 2022-11-13 | End: 2022-11-13

## 2022-11-13 RX ORDER — METOCLOPRAMIDE 10 MG/1
5 TABLET ORAL ONCE
Status: COMPLETED | OUTPATIENT
Start: 2022-11-13 | End: 2022-11-13

## 2022-11-13 RX ORDER — METOCLOPRAMIDE HYDROCHLORIDE 5 MG/ML
5 INJECTION INTRAMUSCULAR; INTRAVENOUS ONCE
Status: DISCONTINUED | OUTPATIENT
Start: 2022-11-13 | End: 2022-11-13

## 2022-11-13 RX ORDER — ALUMINA, MAGNESIA, AND SIMETHICONE 2400; 2400; 240 MG/30ML; MG/30ML; MG/30ML
15 SUSPENSION ORAL ONCE
Status: COMPLETED | OUTPATIENT
Start: 2022-11-13 | End: 2022-11-13

## 2022-11-13 RX ORDER — SODIUM CHLORIDE 0.9 % (FLUSH) 0.9 %
10 SYRINGE (ML) INJECTION AS NEEDED
Status: DISCONTINUED | OUTPATIENT
Start: 2022-11-13 | End: 2022-11-13 | Stop reason: HOSPADM

## 2022-11-13 RX ORDER — HYDRALAZINE HYDROCHLORIDE 10 MG/1
20 TABLET, FILM COATED ORAL ONCE
Status: COMPLETED | OUTPATIENT
Start: 2022-11-13 | End: 2022-11-13

## 2022-11-13 RX ORDER — LIDOCAINE HYDROCHLORIDE 20 MG/ML
15 SOLUTION OROPHARYNGEAL ONCE
Status: COMPLETED | OUTPATIENT
Start: 2022-11-13 | End: 2022-11-13

## 2022-11-13 RX ORDER — NITROFURANTOIN 25; 75 MG/1; MG/1
100 CAPSULE ORAL ONCE
Status: COMPLETED | OUTPATIENT
Start: 2022-11-13 | End: 2022-11-13

## 2022-11-13 RX ADMIN — ONDANSETRON 4 MG: 2 INJECTION INTRAMUSCULAR; INTRAVENOUS at 05:49

## 2022-11-13 RX ADMIN — HYDRALAZINE HYDROCHLORIDE 20 MG: 10 TABLET, FILM COATED ORAL at 07:40

## 2022-11-13 RX ADMIN — ALUMINUM HYDROXIDE, MAGNESIUM HYDROXIDE, AND DIMETHICONE 15 ML: 400; 400; 40 SUSPENSION ORAL at 08:03

## 2022-11-13 RX ADMIN — METOCLOPRAMIDE 5 MG: 10 TABLET ORAL at 08:03

## 2022-11-13 RX ADMIN — PHENOBARBITAL, HYOSCYAMINE SULFATE, ATROPINE SULFATE, SCOPOLAMINE HYDROBROMIDE 32.4 MG: 16.2; .1037; .0194; .0065 TABLET ORAL at 08:03

## 2022-11-13 RX ADMIN — SODIUM CHLORIDE 1000 ML: 9 INJECTION, SOLUTION INTRAVENOUS at 04:55

## 2022-11-13 RX ADMIN — NITROFURANTOIN MONOHYDRATE/MACROCRYSTALLINE 100 MG: 25; 75 CAPSULE ORAL at 07:40

## 2022-11-13 RX ADMIN — IOPAMIDOL 80 ML: 755 INJECTION, SOLUTION INTRAVENOUS at 06:32

## 2022-11-13 RX ADMIN — ONDANSETRON 4 MG: 4 TABLET, ORALLY DISINTEGRATING ORAL at 07:40

## 2022-11-13 RX ADMIN — LIDOCAINE HYDROCHLORIDE 15 ML: 20 SOLUTION ORAL; TOPICAL at 08:03

## 2022-11-13 NOTE — DISCHARGE INSTRUCTIONS
Imaging shows inflammation of the esophagus.  Recommend continue taking omeprazole twice daily before meals.  Please follow-up with your primary care provider and also with GI specialist.  Referral placed to Dr. Carolina Burks.  Please call to schedule an appointment.    Urinalysis consistent with UTI.  Given you have pain in the area of the bladder, will treat for infection with Macrobid for 5 days.  Take no completion of follow-up with your primary care provider for reassessment.    Incidental findings noted on CT imaging include fatty liver disease and left adrenal nodule.  Please follow-up with your primary care provider to further discuss.    Please also note that your blood pressure is significantly elevated.  Recommend obtaining a blood pressure cuff, measuring her blood pressure 3-5 times daily, maintain blood pressures in a log book and follow-up with your primary care provider to discuss.    May try Maalox to help soothe the pain of your esophagus.  Do not take any of your other home medications within 2 hours of using Maalox as it may decrease absorption.    CT Abdomen Pelvis With Contrast    Result Date: 11/13/2022  1. Small hiatal hernia with evidence of lower esophagitis. 2. Gastric bypass with small bowel ileus. No bowel obstruction. 3. Colonic diverticulosis without diverticulitis. Normal appendix. 4. Hysterectomy and cholecystectomy. 5. Severe hepatic steatosis. 6. Enlarging indeterminate left adrenal nodule. Nonemergent follow-up with adrenal protocol CT or MRI is recommended.

## 2022-11-13 NOTE — ED PROVIDER NOTES
Subjective   History of Present Illness  62-year-old female with a history of gastric bypass presents the emergency room complaints of abdominal pain.  Patient reports that yesterday morning she woke up and went out to eat with friends for breakfast.  She states she felt fine and then around 2 PM started having dry heaves.  She states she had multiple episodes of dry heaving.  She states she started having epigastric abdominal pain.  She states that she has noticed dark-colored stool that began around 6 PM yesterday she states that she had another episode this morning.  She states she did not take any medication for relief of symptoms she denies chest pain shortness of breath.  She does convey that she had gastric bypass in 2006 at [facility] in [facility].  She states that in 2011 she developed a gastric ulcer ulcer from her gastric bypass and required 4 units of blood transfusion.  She states that she is not on anticoagulation.  She denies taking antiplatelets.    Abdominal Pain  Pain location:  Epigastric  Pain quality: aching and cramping    Pain radiates to:  Does not radiate  Pain severity:  Moderate  Timing:  Constant  Chronicity:  New  Relieved by:  Nothing  Ineffective treatments:  None tried  Associated symptoms: nausea    Associated symptoms: no chest pain, no constipation, no cough, no diarrhea, no dysuria, no fatigue, no fever, no flatus, no shortness of breath and no vomiting    Risk factors: multiple surgeries        Review of Systems   Constitutional: Negative for fatigue and fever.   Respiratory: Negative for cough and shortness of breath.    Cardiovascular: Negative for chest pain.   Gastrointestinal: Positive for abdominal pain and nausea. Negative for constipation, diarrhea, flatus and vomiting.   Genitourinary: Negative for dysuria.   All other systems reviewed and are negative.      Past Medical History:   Diagnosis Date   • Shoulder fracture        Allergies   Allergen Reactions    • Vancomycin Rash       Past Surgical History:   Procedure Laterality Date   • BLADDER REPAIR     • CHOLECYSTECTOMY     • GASTRIC BYPASS     • HYSTERECTOMY     • LASIK     • LUMBAR DISC SURGERY      L5 fusion   • ORIF HUMERUS FRACTURE Right 8/30/2020    Procedure: RIGHT SHOULDER HUMERUS PROXIMAL OPEN REDUCTION INTERNAL FIXATION WITH PLATE AND SCREW;  Surgeon: Eleazar Núñez MD;  Location: [facility];  Service: Orthopedics;  Laterality: Right;   • TOTAL SHOULDER REPLACEMENT      right        Family History   Problem Relation Age of Onset   • Stomach cancer Mother    • No Known Problems Father        Social History     Socioeconomic History   • Marital status:    Tobacco Use   • Smoking status: Never   • Smokeless tobacco: Never   Substance and Sexual Activity   • Alcohol use: No   • Drug use: No   • Sexual activity: Defer           Objective   Physical Exam  Vitals and nursing note reviewed.   Constitutional:       Appearance: She is not ill-appearing.   HENT:      Head: Normocephalic and atraumatic.      Mouth/Throat:      Mouth: Mucous membranes are moist.   Eyes:      Extraocular Movements: Extraocular movements intact.      Pupils: Pupils are equal, round, and reactive to light.   Cardiovascular:      Rate and Rhythm: Regular rhythm. Tachycardia present.      Heart sounds: No murmur heard.  Pulmonary:      Breath sounds: Normal breath sounds.   Abdominal:      General: Bowel sounds are normal.      Palpations: Abdomen is soft.      Tenderness: There is abdominal tenderness in the epigastric area. There is no guarding or rebound. Negative signs include Rovsing's sign.   Genitourinary:     Rectum: Normal. No tenderness.      Comments: Brown-colored stool on rectal examination.  Skin:     General: Skin is warm and dry.      Capillary Refill: Capillary refill takes less than 2 seconds.   Neurological:      General: No focal deficit present.      Mental Status: She is alert.      Cranial Nerves: No  cranial nerve deficit.   Psychiatric:         Mood and Affect: Mood normal.         Procedures           ED Course  ED Course as of 11/13/22 0852   Sun Nov 13, 2022   0434 Patient's ABG shows elevated pH H&H stable. [BB]   0525 Patient's coags unremarkable. [BB]   0532 EKG sinus tachycardia ventricular 107 parable 122 QRS 64  normal axis no ST elevation. [BB]   0544 Patient elevated lipase 155. [BB]   0548 Troponin negative x1 set. [BB]   0557 Patient's procalcitonin is unremarkable.  Lactic acid slight elevation at 2.2. [BB]   0649 CT Abdomen Pelvis With Contrast    Result Date: 11/13/2022  1. Small hiatal hernia with evidence of lower esophagitis. 2. Gastric bypass with small bowel ileus. No bowel obstruction. 3. Colonic diverticulosis without diverticulitis. Normal appendix. 4. Hysterectomy and cholecystectomy. 5. Severe hepatic steatosis. 6. Enlarging indeterminate left adrenal nodule. Nonemergent follow-up with adrenal protocol CT or MRI is recommended. Signer Name: Bi Herrera MD  Signed: 11/13/2022 6:45 AM  Workstation Name: [facility]  Radiology [facility]    XR Chest 1 View    Result Date: 11/13/2022  No acute cardiopulmonary findings. Signer Name: Bi Herrera MD  Signed: 11/13/2022 6:25 AM  Workstation Name: [facility]  Radiology [facility]     [KP]   0728 Patient reassessed, resting comfortably.  Patient here with epigastric abdominal aching with dark stool which she is concerned could be bleeding ulcer similar to life-threatening episode in 2011.  CT imaging notable for esophagitis which is likely source of pain.  Unable to definitively rule out ulcer however hemoglobin is roughly stable from 6 months ago.  Patient received 1 L fluid bolus, expect hemoglobin to drop due to fluids but will reevaluate hemoglobin.  Patient takes omeprazole twice daily before meals.  Will administer medical  For nausea and ileus and GI cocktail for esophagitis.  Patient's blood  pressure significantly elevated in setting of likely mild GI bleeding, hydralazine ordered.  Patient has borderline criteria for UTI and although she denies dysuria she does have suprapubic abdominal aching for 3 days.  No prior urine culture susceptibilities on file.  We will treat for UTI with Macrobid.  Incidental findings of left adrenal lesion and hepatic steatosis noted. [KP]   0845 Lactate: 0.9 [KP]   0845 Hemoglobin(!): 9.9  Mild decrease in hemoglobin, consistent with drop from 1 L fluid bolus. [KP]   0849 Patient reports feeling significantly better after GI cocktail.  Patient feels ready for going home.  Mild elevated heart rate remains at 108 but no other SIRS criteria, not septic.  Patient referred to GI specialist Dr. Burks.  Patient also follow-up with primary care provider regarding incidental findings which have been discussed.  Patient will log her blood pressures for the next couple of days and discussed them with your primary care provider.  Patient agreeable plan, all questions answered. [KP]      ED Course User Index  [BB] Franklin Schulz MD  [KP] Marco Haro MD                                           MDM  Number of Diagnoses or Management Options  Acute cystitis without hematuria: new and requires workup  Adrenal nodule (HCC): new and requires workup  Esophagitis: new and requires workup  Hepatic steatosis: new and requires workup  Hypertension, unspecified type: new and requires workup  Ileus (HCC): new and requires workup     Amount and/or Complexity of Data Reviewed  Clinical lab tests: reviewed  Tests in the radiology section of CPT®: reviewed  Tests in the medicine section of CPT®: reviewed  Decide to obtain previous medical records or to obtain history from someone other than the patient: yes        Final diagnoses:   Esophagitis   Ileus (HCC)   Adrenal nodule (HCC)   Hepatic steatosis   Acute cystitis without hematuria   Hypertension, unspecified type       ED  Disposition  ED Disposition     ED Disposition   Discharge    Condition   Stable    Comment   --             Annita Adames, APRN  [address]  [address]  [place] [id number]  [phone]    Schedule an appointment as soon as possible for a visit       [facility] Emergency Department  [address] [place] [id number] [phone]    If symptoms worsen    Niru Wilks MD  [address]  [address]  [place] [id number]  [phone]    Schedule an appointment as soon as possible for a visit            Medication List      No changes were made to your prescriptions during this visit.          Marco Haro MD  11/13/22 0890

## 2022-11-18 LAB
BACTERIA SPEC AEROBE CULT: NORMAL
BACTERIA SPEC AEROBE CULT: NORMAL

## 2023-01-17 ENCOUNTER — APPOINTMENT (OUTPATIENT)
Dept: CT IMAGING | Facility: HOSPITAL | Age: 63
End: 2023-01-17
Payer: MEDICAID

## 2023-01-17 ENCOUNTER — HOSPITAL ENCOUNTER (EMERGENCY)
Facility: HOSPITAL | Age: 63
Discharge: HOME | End: 2023-01-17
Attending: STUDENT IN AN ORGANIZED HEALTH CARE EDUCATION/TRAINING PROGRAM | Admitting: STUDENT IN AN ORGANIZED HEALTH CARE EDUCATION/TRAINING PROGRAM
Payer: MEDICAID

## 2023-01-17 ENCOUNTER — APPOINTMENT (OUTPATIENT)
Dept: GENERAL RADIOLOGY | Facility: HOSPITAL | Age: 63
End: 2023-01-17
Payer: MEDICAID

## 2023-01-17 VITALS
DIASTOLIC BLOOD PRESSURE: 93 MMHG | RESPIRATION RATE: 18 BRPM | WEIGHT: 180 LBS | BODY MASS INDEX: 29.99 KG/M2 | OXYGEN SATURATION: 99 % | HEIGHT: 65 IN | SYSTOLIC BLOOD PRESSURE: 167 MMHG | HEART RATE: 108 BPM | TEMPERATURE: 98.4 F

## 2023-01-17 DIAGNOSIS — R42 VERTIGO: ICD-10-CM

## 2023-01-17 DIAGNOSIS — N39.0 ACUTE UTI: ICD-10-CM

## 2023-01-17 DIAGNOSIS — T40.725A ADVERSE EFFECT OF SYNTHETIC CANNABINOID, INITIAL ENCOUNTER: Primary | ICD-10-CM

## 2023-01-17 LAB
ALBUMIN SERPL-MCNC: 3.2 G/DL (ref 3.5–5.2)
ALBUMIN/GLOB SERPL: 1 G/DL
ALP SERPL-CCNC: 122 U/L (ref 39–117)
ALT SERPL W P-5'-P-CCNC: 31 U/L (ref 1–33)
AMPHET+METHAMPHET UR QL: NEGATIVE
AMPHETAMINES UR QL: NEGATIVE
ANION GAP SERPL CALCULATED.3IONS-SCNC: 18 MMOL/L (ref 5–15)
AST SERPL-CCNC: 48 U/L (ref 1–32)
BACTERIA UR QL AUTO: ABNORMAL /HPF
BARBITURATES UR QL SCN: NEGATIVE
BASOPHILS # BLD AUTO: 0.03 10*3/MM3 (ref 0–0.2)
BASOPHILS NFR BLD AUTO: 0.3 % (ref 0–1.5)
BENZODIAZ UR QL SCN: NEGATIVE
BILIRUB SERPL-MCNC: 0.5 MG/DL (ref 0–1.2)
BILIRUB UR QL STRIP: ABNORMAL
BUN SERPL-MCNC: 5 MG/DL (ref 8–23)
BUN/CREAT SERPL: 8.6 (ref 7–25)
BUPRENORPHINE SERPL-MCNC: NEGATIVE NG/ML
CALCIUM SPEC-SCNC: 8.6 MG/DL (ref 8.6–10.5)
CANNABINOIDS SERPL QL: POSITIVE
CHLORIDE SERPL-SCNC: 100 MMOL/L (ref 98–107)
CLARITY UR: ABNORMAL
CO2 SERPL-SCNC: 17 MMOL/L (ref 22–29)
COCAINE UR QL: NEGATIVE
COLOR UR: ABNORMAL
CREAT SERPL-MCNC: 0.58 MG/DL (ref 0.57–1)
DEPRECATED RDW RBC AUTO: 54.6 FL (ref 37–54)
EGFRCR SERPLBLD CKD-EPI 2021: 103.7 ML/MIN/1.73
EOSINOPHIL # BLD AUTO: 0.02 10*3/MM3 (ref 0–0.4)
EOSINOPHIL NFR BLD AUTO: 0.2 % (ref 0.3–6.2)
ERYTHROCYTE [DISTWIDTH] IN BLOOD BY AUTOMATED COUNT: 20.6 % (ref 12.3–15.4)
GLOBULIN UR ELPH-MCNC: 3.2 GM/DL
GLUCOSE SERPL-MCNC: 131 MG/DL (ref 65–99)
GLUCOSE UR STRIP-MCNC: NEGATIVE MG/DL
HCT VFR BLD AUTO: 35.6 % (ref 34–46.6)
HGB BLD-MCNC: 11 G/DL (ref 12–15.9)
HGB UR QL STRIP.AUTO: NEGATIVE
HOLD SPECIMEN: NORMAL
HOLD SPECIMEN: NORMAL
HYALINE CASTS UR QL AUTO: ABNORMAL /LPF
IMM GRANULOCYTES # BLD AUTO: 0.04 10*3/MM3 (ref 0–0.05)
IMM GRANULOCYTES NFR BLD AUTO: 0.4 % (ref 0–0.5)
INR PPP: 0.95 (ref 0.9–1.1)
KETONES UR QL STRIP: ABNORMAL
LEUKOCYTE ESTERASE UR QL STRIP.AUTO: ABNORMAL
LYMPHOCYTES # BLD AUTO: 0.69 10*3/MM3 (ref 0.7–3.1)
LYMPHOCYTES NFR BLD AUTO: 7.1 % (ref 19.6–45.3)
MCH RBC QN AUTO: 23.2 PG (ref 26.6–33)
MCHC RBC AUTO-ENTMCNC: 30.9 G/DL (ref 31.5–35.7)
MCV RBC AUTO: 75.1 FL (ref 79–97)
METHADONE UR QL SCN: NEGATIVE
MONOCYTES # BLD AUTO: 0.42 10*3/MM3 (ref 0.1–0.9)
MONOCYTES NFR BLD AUTO: 4.3 % (ref 5–12)
NEUTROPHILS NFR BLD AUTO: 8.51 10*3/MM3 (ref 1.7–7)
NEUTROPHILS NFR BLD AUTO: 87.7 % (ref 42.7–76)
NITRITE UR QL STRIP: POSITIVE
NRBC BLD AUTO-RTO: 0 /100 WBC (ref 0–0.2)
NT-PROBNP SERPL-MCNC: <36 PG/ML (ref 0–900)
OPIATES UR QL: NEGATIVE
OXYCODONE UR QL SCN: NEGATIVE
PCP UR QL SCN: NEGATIVE
PH UR STRIP.AUTO: 6 [PH] (ref 5–8)
PLATELET # BLD AUTO: 416 10*3/MM3 (ref 140–450)
PMV BLD AUTO: 8.3 FL (ref 6–12)
POTASSIUM SERPL-SCNC: 3.5 MMOL/L (ref 3.5–5.2)
PROPOXYPH UR QL: NEGATIVE
PROT SERPL-MCNC: 6.4 G/DL (ref 6–8.5)
PROT UR QL STRIP: ABNORMAL
PROTHROMBIN TIME: 12.9 SECONDS (ref 12.1–14.7)
QT INTERVAL: 268 MS
QTC INTERVAL: 439 MS
RBC # BLD AUTO: 4.74 10*6/MM3 (ref 3.77–5.28)
RBC # UR STRIP: ABNORMAL /HPF
REF LAB TEST METHOD: ABNORMAL
SODIUM SERPL-SCNC: 135 MMOL/L (ref 136–145)
SP GR UR STRIP: 1.02 (ref 1–1.03)
SQUAMOUS #/AREA URNS HPF: ABNORMAL /HPF
TRICYCLICS UR QL SCN: NEGATIVE
TROPONIN T SERPL-MCNC: <0.01 NG/ML (ref 0–0.03)
UROBILINOGEN UR QL STRIP: ABNORMAL
WBC # UR STRIP: ABNORMAL /HPF
WBC NRBC COR # BLD: 9.71 10*3/MM3 (ref 3.4–10.8)
WHOLE BLOOD HOLD COAG: NORMAL
WHOLE BLOOD HOLD SPECIMEN: NORMAL

## 2023-01-17 PROCEDURE — 70450 CT HEAD/BRAIN W/O DYE: CPT

## 2023-01-17 PROCEDURE — 80306 DRUG TEST PRSMV INSTRMNT: CPT | Performed by: STUDENT IN AN ORGANIZED HEALTH CARE EDUCATION/TRAINING PROGRAM

## 2023-01-17 PROCEDURE — 93005 ELECTROCARDIOGRAM TRACING: CPT | Performed by: STUDENT IN AN ORGANIZED HEALTH CARE EDUCATION/TRAINING PROGRAM

## 2023-01-17 PROCEDURE — 83880 ASSAY OF NATRIURETIC PEPTIDE: CPT | Performed by: STUDENT IN AN ORGANIZED HEALTH CARE EDUCATION/TRAINING PROGRAM

## 2023-01-17 PROCEDURE — 96374 THER/PROPH/DIAG INJ IV PUSH: CPT

## 2023-01-17 PROCEDURE — 71045 X-RAY EXAM CHEST 1 VIEW: CPT | Performed by: RADIOLOGY

## 2023-01-17 PROCEDURE — 99284 EMERGENCY DEPT VISIT MOD MDM: CPT

## 2023-01-17 PROCEDURE — 80053 COMPREHEN METABOLIC PANEL: CPT | Performed by: STUDENT IN AN ORGANIZED HEALTH CARE EDUCATION/TRAINING PROGRAM

## 2023-01-17 PROCEDURE — 25010000002 ONDANSETRON PER 1 MG: Performed by: STUDENT IN AN ORGANIZED HEALTH CARE EDUCATION/TRAINING PROGRAM

## 2023-01-17 PROCEDURE — 81001 URINALYSIS AUTO W/SCOPE: CPT | Performed by: STUDENT IN AN ORGANIZED HEALTH CARE EDUCATION/TRAINING PROGRAM

## 2023-01-17 PROCEDURE — 84484 ASSAY OF TROPONIN QUANT: CPT | Performed by: STUDENT IN AN ORGANIZED HEALTH CARE EDUCATION/TRAINING PROGRAM

## 2023-01-17 PROCEDURE — 87088 URINE BACTERIA CULTURE: CPT | Performed by: STUDENT IN AN ORGANIZED HEALTH CARE EDUCATION/TRAINING PROGRAM

## 2023-01-17 PROCEDURE — 96375 TX/PRO/DX INJ NEW DRUG ADDON: CPT

## 2023-01-17 PROCEDURE — 87086 URINE CULTURE/COLONY COUNT: CPT | Performed by: STUDENT IN AN ORGANIZED HEALTH CARE EDUCATION/TRAINING PROGRAM

## 2023-01-17 PROCEDURE — 85610 PROTHROMBIN TIME: CPT | Performed by: STUDENT IN AN ORGANIZED HEALTH CARE EDUCATION/TRAINING PROGRAM

## 2023-01-17 PROCEDURE — 93010 ELECTROCARDIOGRAM REPORT: CPT | Performed by: INTERNAL MEDICINE

## 2023-01-17 PROCEDURE — 70450 CT HEAD/BRAIN W/O DYE: CPT | Performed by: RADIOLOGY

## 2023-01-17 PROCEDURE — 85025 COMPLETE CBC W/AUTO DIFF WBC: CPT | Performed by: STUDENT IN AN ORGANIZED HEALTH CARE EDUCATION/TRAINING PROGRAM

## 2023-01-17 PROCEDURE — 71045 X-RAY EXAM CHEST 1 VIEW: CPT

## 2023-01-17 PROCEDURE — 87186 SC STD MICRODIL/AGAR DIL: CPT | Performed by: STUDENT IN AN ORGANIZED HEALTH CARE EDUCATION/TRAINING PROGRAM

## 2023-01-17 RX ORDER — ONDANSETRON 2 MG/ML
4 INJECTION INTRAMUSCULAR; INTRAVENOUS ONCE
Status: COMPLETED | OUTPATIENT
Start: 2023-01-17 | End: 2023-01-17

## 2023-01-17 RX ORDER — METOPROLOL TARTRATE 5 MG/5ML
5 INJECTION INTRAVENOUS ONCE
Status: COMPLETED | OUTPATIENT
Start: 2023-01-17 | End: 2023-01-17

## 2023-01-17 RX ORDER — LABETALOL HYDROCHLORIDE 5 MG/ML
10 INJECTION, SOLUTION INTRAVENOUS ONCE
Status: COMPLETED | OUTPATIENT
Start: 2023-01-17 | End: 2023-01-17

## 2023-01-17 RX ORDER — SODIUM CHLORIDE 0.9 % (FLUSH) 0.9 %
10 SYRINGE (ML) INJECTION AS NEEDED
Status: DISCONTINUED | OUTPATIENT
Start: 2023-01-17 | End: 2023-01-17 | Stop reason: HOSPADM

## 2023-01-17 RX ORDER — NITROFURANTOIN 25; 75 MG/1; MG/1
100 CAPSULE ORAL 2 TIMES DAILY
Qty: 13 CAPSULE | Refills: 0 | Status: SHIPPED | OUTPATIENT
Start: 2023-01-17 | End: 2023-02-03

## 2023-01-17 RX ORDER — LORAZEPAM 2 MG/ML
0.5 INJECTION INTRAMUSCULAR ONCE
Status: DISCONTINUED | OUTPATIENT
Start: 2023-01-17 | End: 2023-01-17

## 2023-01-17 RX ORDER — MECLIZINE HYDROCHLORIDE 25 MG/1
25 TABLET ORAL 3 TIMES DAILY PRN
Qty: 20 TABLET | Refills: 0 | Status: SHIPPED | OUTPATIENT
Start: 2023-01-17 | End: 2023-02-03

## 2023-01-17 RX ORDER — NITROFURANTOIN 25; 75 MG/1; MG/1
100 CAPSULE ORAL ONCE
Status: COMPLETED | OUTPATIENT
Start: 2023-01-17 | End: 2023-01-17

## 2023-01-17 RX ORDER — MECLIZINE HCL 12.5 MG/1
25 TABLET ORAL ONCE
Status: COMPLETED | OUTPATIENT
Start: 2023-01-17 | End: 2023-01-17

## 2023-01-17 RX ADMIN — ONDANSETRON 4 MG: 2 INJECTION INTRAMUSCULAR; INTRAVENOUS at 14:59

## 2023-01-17 RX ADMIN — METOPROLOL TARTRATE 5 MG: 1 INJECTION, SOLUTION INTRAVENOUS at 13:41

## 2023-01-17 RX ADMIN — Medication 10 MG: at 15:19

## 2023-01-17 RX ADMIN — MECLIZINE HCL 12.5 MG 25 MG: 12.5 TABLET ORAL at 15:59

## 2023-01-17 RX ADMIN — SODIUM CHLORIDE 1000 ML: 9 INJECTION, SOLUTION INTRAVENOUS at 14:59

## 2023-01-17 RX ADMIN — NITROFURANTOIN MONOHYDRATE/MACROCRYSTALLINE 100 MG: 25; 75 CAPSULE ORAL at 16:16

## 2023-01-17 NOTE — ED PROVIDER NOTES
Subjective   History of Present Illness  62-year-old female who presents to the with a primary complaint of persistent dizziness in syncopal episodes over the last 2 to 3 days.  Denied chest pain.  Patient noted she felt like her heart rate was elevated.  No significant nausea.  Patient noted symptoms are worsened with standing up straight and ambulation.  No chest pain with symptoms.  No neurological deficits.  No numbness.  No paresthesia.  No vision changes.  No headache.  No meningismal symptoms.  No fever.  No obvious aggravating or alleviating factors.  Vitals stable        Review of Systems   Neurological: Positive for dizziness and syncope.   All other systems reviewed and are negative.      Past Medical History:   Diagnosis Date   • Shoulder fracture        Allergies   Allergen Reactions   • Vancomycin Rash       Past Surgical History:   Procedure Laterality Date   • BLADDER REPAIR     • CHOLECYSTECTOMY     • GASTRIC BYPASS     • HYSTERECTOMY     • LASIK     • LUMBAR DISC SURGERY      L5 fusion   • ORIF HUMERUS FRACTURE Right 8/30/2020    Procedure: RIGHT SHOULDER HUMERUS PROXIMAL OPEN REDUCTION INTERNAL FIXATION WITH PLATE AND SCREW;  Surgeon: Eleazar Núñez MD;  Location: [facility];  Service: Orthopedics;  Laterality: Right;   • TOTAL SHOULDER REPLACEMENT      right        Family History   Problem Relation Age of Onset   • Stomach cancer Mother    • No Known Problems Father        Social History     Socioeconomic History   • Marital status:    Tobacco Use   • Smoking status: Never   • Smokeless tobacco: Never   Substance and Sexual Activity   • Alcohol use: No   • Drug use: No   • Sexual activity: Defer           Objective   Physical Exam  Constitutional:       General: She is not in acute distress.     Appearance: Normal appearance. She is not ill-appearing.   HENT:      Head: Normocephalic and atraumatic.      Right Ear: External ear normal.      Left Ear: External ear normal.      Nose:  Nose normal.      Mouth/Throat:      Mouth: Mucous membranes are moist.   Eyes:      Extraocular Movements: Extraocular movements intact.      Pupils: Pupils are equal, round, and reactive to light.   Cardiovascular:      Rate and Rhythm: Regular rhythm. Tachycardia present.      Heart sounds: No murmur heard.  Pulmonary:      Effort: Pulmonary effort is normal. No respiratory distress.      Breath sounds: Normal breath sounds. No wheezing.   Abdominal:      General: Bowel sounds are normal.      Palpations: Abdomen is soft.      Tenderness: There is no abdominal tenderness.   Musculoskeletal:         General: No deformity or signs of injury. Normal range of motion.      Cervical back: Normal range of motion and neck supple.   Skin:     General: Skin is warm and dry.      Findings: No erythema.   Neurological:      General: No focal deficit present.      Mental Status: She is alert and oriented to person, place, and time. Mental status is at baseline.      Cranial Nerves: No cranial nerve deficit.   Psychiatric:         Mood and Affect: Mood normal.         Behavior: Behavior normal.         Thought Content: Thought content normal.         Procedures           ED Course  ED Course as of 01/17/23 1647   Tue Jan 17, 2023   1337 EKG notes sinus tachycardia.  162 bpm.  No acute ST elevation.  QTc 439    Electronically signed by Jonathan Tinsley DO, 01/17/23, 1:37 PM EST.   [SF]      ED Course User Index  [SF] Jonathan Tinsley DO                                           Medical Decision Making  CBC and CMP unremarkable.  Troponin within normal is.  EKG noted persistent tachycardia.  Heart rate improved with IV fluids and beta-blockers.  Patient has significant symptom improvement with antiemetics and meclizine.  Concern for possible vertigo.  Head CT without contrast notes no acute abnormality.  Chest x-ray unremarkable.  Urinalysis notes concern for acute UTI.  UDS was also positive for cannabinoids.  Patient admitted  to using delta 8 Gummies and close correlation to onset of her symptoms.  Cannot rule out possible adverse effect to cannabinoids.  Work up and results were discussed throughly with the patient.  The patient will be discharged for further monitoring and management with their PCP.  Red flags, warning signs, worsening symptoms, and when to return to the ER discussed with and understood by the patient.  Patient will follow up with their PCP in a timely manner.  Vitals stable at discharge.    Adverse effect of synthetic cannabinoid, initial encounter: complicated acute illness or injury  Vertigo: complicated acute illness or injury  Amount and/or Complexity of Data Reviewed  Labs: ordered.  Radiology: ordered.  ECG/medicine tests: ordered.      Risk  Prescription drug management.          Final diagnoses:   Adverse effect of synthetic cannabinoid, initial encounter   Vertigo   Acute UTI       ED Disposition  ED Disposition     None          No follow-up provider specified.       Medication List      New Prescriptions    nitrofurantoin (macrocrystal-monohydrate) 100 MG capsule  Commonly known as: MACROBID  Take 1 capsule by mouth 2 (Two) Times a Day.           Where to Get Your Medications      These medications were sent to [facility] [address] [place] [address] [phone] [place] [phone] [address] [address]    Phone: [phone]   · nitrofurantoin (macrocrystal-monohydrate) 100 MG capsule          Jonathan Tinsley DO  01/17/23 4186

## 2023-01-17 NOTE — ED NOTES
MEDICAL SCREENING:    Reason for Visit: Dizziness, syncope, weakness since yesterday evening    Patient initially seen in triage.  The patient was advised further evaluation and diagnostic testing will be needed, some of the treatment and testing will be initiated in the lobby in order to begin the process.  The patient will be returned to the waiting area for the time being and possibly be re-assessed by a subsequent ED provider.  The patient will be brought back to the treatment area in as timely manner as possible.         Jonathan Tinsley, DO  01/17/23 1243

## 2023-01-19 LAB — BACTERIA SPEC AEROBE CULT: ABNORMAL

## 2023-01-25 ENCOUNTER — APPOINTMENT (OUTPATIENT)
Dept: GENERAL RADIOLOGY | Facility: HOSPITAL | Age: 63
End: 2023-01-25
Payer: MEDICAID

## 2023-01-25 ENCOUNTER — APPOINTMENT (OUTPATIENT)
Dept: CT IMAGING | Facility: HOSPITAL | Age: 63
End: 2023-01-25
Payer: MEDICAID

## 2023-01-25 ENCOUNTER — HOSPITAL ENCOUNTER (EMERGENCY)
Facility: HOSPITAL | Age: 63
Discharge: HOME | End: 2023-01-26
Attending: STUDENT IN AN ORGANIZED HEALTH CARE EDUCATION/TRAINING PROGRAM | Admitting: STUDENT IN AN ORGANIZED HEALTH CARE EDUCATION/TRAINING PROGRAM
Payer: MEDICAID

## 2023-01-25 DIAGNOSIS — R42 DIZZINESS ON STANDING: ICD-10-CM

## 2023-01-25 DIAGNOSIS — N39.0 URINARY TRACT INFECTION WITH HEMATURIA, SITE UNSPECIFIED: Primary | ICD-10-CM

## 2023-01-25 DIAGNOSIS — R00.0 SINUS TACHYCARDIA: ICD-10-CM

## 2023-01-25 DIAGNOSIS — R31.9 URINARY TRACT INFECTION WITH HEMATURIA, SITE UNSPECIFIED: Primary | ICD-10-CM

## 2023-01-25 LAB
ALBUMIN SERPL-MCNC: 3.1 G/DL (ref 3.5–5.2)
ALBUMIN/GLOB SERPL: 0.9 G/DL
ALP SERPL-CCNC: 102 U/L (ref 39–117)
ALT SERPL W P-5'-P-CCNC: 53 U/L (ref 1–33)
AMMONIA BLD-SCNC: 24 UMOL/L (ref 11–51)
ANION GAP SERPL CALCULATED.3IONS-SCNC: 12.1 MMOL/L (ref 5–15)
ANISOCYTOSIS BLD QL: NORMAL
AST SERPL-CCNC: 72 U/L (ref 1–32)
BACTERIA UR QL AUTO: ABNORMAL /HPF
BASOPHILS # BLD AUTO: 0.02 10*3/MM3 (ref 0–0.2)
BASOPHILS NFR BLD AUTO: 0.3 % (ref 0–1.5)
BILIRUB SERPL-MCNC: 0.5 MG/DL (ref 0–1.2)
BILIRUB UR QL STRIP: NEGATIVE
BUN SERPL-MCNC: 6 MG/DL (ref 8–23)
BUN/CREAT SERPL: 10.9 (ref 7–25)
CALCIUM SPEC-SCNC: 8.6 MG/DL (ref 8.6–10.5)
CHLORIDE SERPL-SCNC: 99 MMOL/L (ref 98–107)
CLARITY UR: CLEAR
CO2 SERPL-SCNC: 21.9 MMOL/L (ref 22–29)
COLOR UR: ABNORMAL
CREAT SERPL-MCNC: 0.55 MG/DL (ref 0.57–1)
CRP SERPL-MCNC: <0.3 MG/DL (ref 0–0.5)
D-LACTATE SERPL-SCNC: 1.9 MMOL/L (ref 0.5–2)
D-LACTATE SERPL-SCNC: 2.1 MMOL/L (ref 0.5–2)
DEPRECATED RDW RBC AUTO: 64.6 FL (ref 37–54)
EGFRCR SERPLBLD CKD-EPI 2021: 105.1 ML/MIN/1.73
EOSINOPHIL # BLD AUTO: 0.02 10*3/MM3 (ref 0–0.4)
EOSINOPHIL NFR BLD AUTO: 0.3 % (ref 0.3–6.2)
ERYTHROCYTE [DISTWIDTH] IN BLOOD BY AUTOMATED COUNT: 23.1 % (ref 12.3–15.4)
ERYTHROCYTE [SEDIMENTATION RATE] IN BLOOD: 23 MM/HR (ref 0–30)
ETHANOL BLD-MCNC: <10 MG/DL (ref 0–10)
ETHANOL UR QL: <0.01 %
FLUAV RNA RESP QL NAA+PROBE: NOT DETECTED
FLUBV RNA RESP QL NAA+PROBE: NOT DETECTED
GLOBULIN UR ELPH-MCNC: 3.3 GM/DL
GLUCOSE SERPL-MCNC: 116 MG/DL (ref 65–99)
GLUCOSE UR STRIP-MCNC: NEGATIVE MG/DL
HCT VFR BLD AUTO: 35.8 % (ref 34–46.6)
HGB BLD-MCNC: 10.6 G/DL (ref 12–15.9)
HGB UR QL STRIP.AUTO: NEGATIVE
HOLD SPECIMEN: NORMAL
HOLD SPECIMEN: NORMAL
HYALINE CASTS UR QL AUTO: ABNORMAL /LPF
HYPOCHROMIA BLD QL: NORMAL
IMM GRANULOCYTES # BLD AUTO: 0.05 10*3/MM3 (ref 0–0.05)
IMM GRANULOCYTES NFR BLD AUTO: 0.7 % (ref 0–0.5)
KETONES UR QL STRIP: NEGATIVE
LEUKOCYTE ESTERASE UR QL STRIP.AUTO: ABNORMAL
LIPASE SERPL-CCNC: 50 U/L (ref 13–60)
LYMPHOCYTES # BLD AUTO: 0.45 10*3/MM3 (ref 0.7–3.1)
LYMPHOCYTES NFR BLD AUTO: 6.1 % (ref 19.6–45.3)
MCH RBC QN AUTO: 23.8 PG (ref 26.6–33)
MCHC RBC AUTO-ENTMCNC: 29.6 G/DL (ref 31.5–35.7)
MCV RBC AUTO: 80.3 FL (ref 79–97)
MONOCYTES # BLD AUTO: 0.72 10*3/MM3 (ref 0.1–0.9)
MONOCYTES NFR BLD AUTO: 9.8 % (ref 5–12)
NEUTROPHILS NFR BLD AUTO: 6.08 10*3/MM3 (ref 1.7–7)
NEUTROPHILS NFR BLD AUTO: 82.8 % (ref 42.7–76)
NITRITE UR QL STRIP: POSITIVE
NRBC BLD AUTO-RTO: 0 /100 WBC (ref 0–0.2)
NT-PROBNP SERPL-MCNC: 189.8 PG/ML (ref 0–900)
PH UR STRIP.AUTO: 7.5 [PH] (ref 5–8)
PLAT MORPH BLD: NORMAL
PLATELET # BLD AUTO: 300 10*3/MM3 (ref 140–450)
PMV BLD AUTO: 8.2 FL (ref 6–12)
POTASSIUM SERPL-SCNC: 4.8 MMOL/L (ref 3.5–5.2)
PROCALCITONIN SERPL-MCNC: 0.09 NG/ML (ref 0–0.25)
PROT SERPL-MCNC: 6.4 G/DL (ref 6–8.5)
PROT UR QL STRIP: NEGATIVE
QT INTERVAL: 296 MS
QTC INTERVAL: 453 MS
RBC # BLD AUTO: 4.46 10*6/MM3 (ref 3.77–5.28)
RBC # UR STRIP: ABNORMAL /HPF
REF LAB TEST METHOD: ABNORMAL
SARS-COV-2 RNA RESP QL NAA+PROBE: NOT DETECTED
SODIUM SERPL-SCNC: 133 MMOL/L (ref 136–145)
SP GR UR STRIP: 1.01 (ref 1–1.03)
SQUAMOUS #/AREA URNS HPF: ABNORMAL /HPF
T4 FREE SERPL-MCNC: 1.03 NG/DL (ref 0.93–1.7)
TROPONIN T SERPL-MCNC: <0.01 NG/ML (ref 0–0.03)
TSH SERPL DL<=0.05 MIU/L-ACNC: 2.97 UIU/ML (ref 0.27–4.2)
UROBILINOGEN UR QL STRIP: ABNORMAL
WBC # UR STRIP: ABNORMAL /HPF
WBC NRBC COR # BLD: 7.34 10*3/MM3 (ref 3.4–10.8)
WHOLE BLOOD HOLD COAG: NORMAL
WHOLE BLOOD HOLD SPECIMEN: NORMAL

## 2023-01-25 PROCEDURE — 85652 RBC SED RATE AUTOMATED: CPT | Performed by: STUDENT IN AN ORGANIZED HEALTH CARE EDUCATION/TRAINING PROGRAM

## 2023-01-25 PROCEDURE — 82140 ASSAY OF AMMONIA: CPT | Performed by: STUDENT IN AN ORGANIZED HEALTH CARE EDUCATION/TRAINING PROGRAM

## 2023-01-25 PROCEDURE — 80053 COMPREHEN METABOLIC PANEL: CPT | Performed by: PHYSICIAN ASSISTANT

## 2023-01-25 PROCEDURE — 71045 X-RAY EXAM CHEST 1 VIEW: CPT

## 2023-01-25 PROCEDURE — 81001 URINALYSIS AUTO W/SCOPE: CPT | Performed by: PHYSICIAN ASSISTANT

## 2023-01-25 PROCEDURE — 87040 BLOOD CULTURE FOR BACTERIA: CPT | Performed by: STUDENT IN AN ORGANIZED HEALTH CARE EDUCATION/TRAINING PROGRAM

## 2023-01-25 PROCEDURE — 96365 THER/PROPH/DIAG IV INF INIT: CPT

## 2023-01-25 PROCEDURE — 96361 HYDRATE IV INFUSION ADD-ON: CPT

## 2023-01-25 PROCEDURE — 84484 ASSAY OF TROPONIN QUANT: CPT | Performed by: PHYSICIAN ASSISTANT

## 2023-01-25 PROCEDURE — 83690 ASSAY OF LIPASE: CPT | Performed by: PHYSICIAN ASSISTANT

## 2023-01-25 PROCEDURE — 74177 CT ABD & PELVIS W/CONTRAST: CPT

## 2023-01-25 PROCEDURE — 85025 COMPLETE CBC W/AUTO DIFF WBC: CPT | Performed by: PHYSICIAN ASSISTANT

## 2023-01-25 PROCEDURE — 70450 CT HEAD/BRAIN W/O DYE: CPT

## 2023-01-25 PROCEDURE — 82077 ASSAY SPEC XCP UR&BREATH IA: CPT | Performed by: STUDENT IN AN ORGANIZED HEALTH CARE EDUCATION/TRAINING PROGRAM

## 2023-01-25 PROCEDURE — 99284 EMERGENCY DEPT VISIT MOD MDM: CPT

## 2023-01-25 PROCEDURE — 25010000002 IOPAMIDOL 61 % SOLUTION: Performed by: STUDENT IN AN ORGANIZED HEALTH CARE EDUCATION/TRAINING PROGRAM

## 2023-01-25 PROCEDURE — 25010000002 CEFTRIAXONE PER 250 MG: Performed by: STUDENT IN AN ORGANIZED HEALTH CARE EDUCATION/TRAINING PROGRAM

## 2023-01-25 PROCEDURE — 93010 ELECTROCARDIOGRAM REPORT: CPT | Performed by: INTERNAL MEDICINE

## 2023-01-25 PROCEDURE — 84439 ASSAY OF FREE THYROXINE: CPT | Performed by: STUDENT IN AN ORGANIZED HEALTH CARE EDUCATION/TRAINING PROGRAM

## 2023-01-25 PROCEDURE — 99283 EMERGENCY DEPT VISIT LOW MDM: CPT

## 2023-01-25 PROCEDURE — 83880 ASSAY OF NATRIURETIC PEPTIDE: CPT | Performed by: STUDENT IN AN ORGANIZED HEALTH CARE EDUCATION/TRAINING PROGRAM

## 2023-01-25 PROCEDURE — 83605 ASSAY OF LACTIC ACID: CPT | Performed by: STUDENT IN AN ORGANIZED HEALTH CARE EDUCATION/TRAINING PROGRAM

## 2023-01-25 PROCEDURE — 93005 ELECTROCARDIOGRAM TRACING: CPT | Performed by: PHYSICIAN ASSISTANT

## 2023-01-25 PROCEDURE — 84145 PROCALCITONIN (PCT): CPT | Performed by: STUDENT IN AN ORGANIZED HEALTH CARE EDUCATION/TRAINING PROGRAM

## 2023-01-25 PROCEDURE — 85007 BL SMEAR W/DIFF WBC COUNT: CPT | Performed by: PHYSICIAN ASSISTANT

## 2023-01-25 PROCEDURE — 36415 COLL VENOUS BLD VENIPUNCTURE: CPT

## 2023-01-25 PROCEDURE — 84443 ASSAY THYROID STIM HORMONE: CPT | Performed by: STUDENT IN AN ORGANIZED HEALTH CARE EDUCATION/TRAINING PROGRAM

## 2023-01-25 PROCEDURE — 87636 SARSCOV2 & INF A&B AMP PRB: CPT | Performed by: STUDENT IN AN ORGANIZED HEALTH CARE EDUCATION/TRAINING PROGRAM

## 2023-01-25 PROCEDURE — 86140 C-REACTIVE PROTEIN: CPT | Performed by: STUDENT IN AN ORGANIZED HEALTH CARE EDUCATION/TRAINING PROGRAM

## 2023-01-25 RX ADMIN — SODIUM CHLORIDE 1000 ML: 9 INJECTION, SOLUTION INTRAVENOUS at 18:54

## 2023-01-25 RX ADMIN — IOPAMIDOL 80 ML: 612 INJECTION, SOLUTION INTRAVENOUS at 21:39

## 2023-01-25 RX ADMIN — SODIUM CHLORIDE 1000 ML: 9 INJECTION, SOLUTION INTRAVENOUS at 22:50

## 2023-01-25 RX ADMIN — CEFTRIAXONE 1 G: 1 INJECTION, POWDER, FOR SOLUTION INTRAMUSCULAR; INTRAVENOUS at 21:41

## 2023-01-26 VITALS
DIASTOLIC BLOOD PRESSURE: 84 MMHG | HEIGHT: 65 IN | TEMPERATURE: 97.7 F | WEIGHT: 170 LBS | OXYGEN SATURATION: 100 % | RESPIRATION RATE: 18 BRPM | SYSTOLIC BLOOD PRESSURE: 165 MMHG | BODY MASS INDEX: 28.32 KG/M2 | HEART RATE: 122 BPM

## 2023-01-26 RX ORDER — LEVOFLOXACIN 500 MG/1
500 TABLET, FILM COATED ORAL DAILY
Qty: 7 TABLET | Refills: 0 | Status: SHIPPED | OUTPATIENT
Start: 2023-01-26 | End: 2023-02-03

## 2023-01-30 LAB
BACTERIA SPEC AEROBE CULT: NORMAL
BACTERIA SPEC AEROBE CULT: NORMAL

## 2023-02-03 ENCOUNTER — APPOINTMENT (OUTPATIENT)
Dept: CT IMAGING | Facility: HOSPITAL | Age: 63
DRG: 312 | End: 2023-02-03
Payer: MEDICARE

## 2023-02-03 ENCOUNTER — APPOINTMENT (OUTPATIENT)
Dept: MRI IMAGING | Facility: HOSPITAL | Age: 63
DRG: 312 | End: 2023-02-03
Payer: MEDICARE

## 2023-02-03 ENCOUNTER — APPOINTMENT (OUTPATIENT)
Dept: GENERAL RADIOLOGY | Facility: HOSPITAL | Age: 63
DRG: 312 | End: 2023-02-03
Payer: MEDICARE

## 2023-02-03 ENCOUNTER — HOSPITAL ENCOUNTER (INPATIENT)
Facility: HOSPITAL | Age: 63
LOS: 1 days | Discharge: HOME | DRG: 312 | End: 2023-02-07
Attending: EMERGENCY MEDICINE | Admitting: HOSPITALIST
Payer: MEDICARE

## 2023-02-03 DIAGNOSIS — R55 SYNCOPE, UNSPECIFIED SYNCOPE TYPE: ICD-10-CM

## 2023-02-03 DIAGNOSIS — R00.0 RAPID RESTING HEART RATE: Primary | ICD-10-CM

## 2023-02-03 DIAGNOSIS — I47.1 PAROXYSMAL SVT (SUPRAVENTRICULAR TACHYCARDIA): ICD-10-CM

## 2023-02-03 DIAGNOSIS — R29.90 STROKE-LIKE SYMPTOMS: ICD-10-CM

## 2023-02-03 LAB
A-A DO2: 26.4 MMHG (ref 0–300)
ABO GROUP BLD: NORMAL
ALBUMIN SERPL-MCNC: 3.5 G/DL (ref 3.5–5.2)
ALBUMIN/GLOB SERPL: 1.1 G/DL
ALP SERPL-CCNC: 130 U/L (ref 39–117)
ALT SERPL W P-5'-P-CCNC: 82 U/L (ref 1–33)
ANION GAP SERPL CALCULATED.3IONS-SCNC: 16.5 MMOL/L (ref 5–15)
ANISOCYTOSIS BLD QL: NORMAL
APTT PPP: 29 SECONDS (ref 26.5–34.5)
ARTERIAL PATENCY WRIST A: ABNORMAL
AST SERPL-CCNC: 120 U/L (ref 1–32)
ATMOSPHERIC PRESS: 737 MMHG
BASE EXCESS BLDA CALC-SCNC: -2 MMOL/L (ref 0–2)
BASOPHILS # BLD AUTO: 0.03 10*3/MM3 (ref 0–0.2)
BASOPHILS NFR BLD AUTO: 0.5 % (ref 0–1.5)
BDY SITE: ABNORMAL
BILIRUB SERPL-MCNC: 0.4 MG/DL (ref 0–1.2)
BLD GP AB SCN SERPL QL: NEGATIVE
BODY TEMPERATURE: 0 C
BUN SERPL-MCNC: 6 MG/DL (ref 8–23)
BUN/CREAT SERPL: 11.5 (ref 7–25)
CALCIUM SPEC-SCNC: 8.4 MG/DL (ref 8.6–10.5)
CHLORIDE SERPL-SCNC: 106 MMOL/L (ref 98–107)
CO2 BLDA-SCNC: 21.5 MMOL/L (ref 22–33)
CO2 SERPL-SCNC: 18.5 MMOL/L (ref 22–29)
COHGB MFR BLD: 0.5 % (ref 0–5)
CREAT SERPL-MCNC: 0.52 MG/DL (ref 0.57–1)
CRP SERPL-MCNC: <0.3 MG/DL (ref 0–0.5)
D-LACTATE SERPL-SCNC: 2.8 MMOL/L (ref 0.5–2)
D-LACTATE SERPL-SCNC: 2.8 MMOL/L (ref 0.5–2)
DEPRECATED RDW RBC AUTO: 62.7 FL (ref 37–54)
EGFRCR SERPLBLD CKD-EPI 2021: 106.5 ML/MIN/1.73
EOSINOPHIL # BLD AUTO: 0 10*3/MM3 (ref 0–0.4)
EOSINOPHIL NFR BLD AUTO: 0 % (ref 0.3–6.2)
ERYTHROCYTE [DISTWIDTH] IN BLOOD BY AUTOMATED COUNT: 22.7 % (ref 12.3–15.4)
ERYTHROCYTE [SEDIMENTATION RATE] IN BLOOD: 45 MM/HR (ref 0–30)
FLUAV RNA RESP QL NAA+PROBE: NOT DETECTED
FLUBV RNA RESP QL NAA+PROBE: NOT DETECTED
GLOBULIN UR ELPH-MCNC: 3.3 GM/DL
GLUCOSE BLDC GLUCOMTR-MCNC: 137 MG/DL (ref 70–130)
GLUCOSE SERPL-MCNC: 110 MG/DL (ref 65–99)
HCO3 BLDA-SCNC: 20.7 MMOL/L (ref 20–26)
HCT VFR BLD AUTO: 35.9 % (ref 34–46.6)
HCT VFR BLD CALC: 34 % (ref 38–51)
HGB BLD-MCNC: 10.9 G/DL (ref 12–15.9)
HGB BLDA-MCNC: 11.1 G/DL (ref 13.5–17.5)
HOLD SPECIMEN: NORMAL
HOLD SPECIMEN: NORMAL
HYPOCHROMIA BLD QL: NORMAL
IMM GRANULOCYTES # BLD AUTO: 0.03 10*3/MM3 (ref 0–0.05)
IMM GRANULOCYTES NFR BLD AUTO: 0.5 % (ref 0–0.5)
INHALED O2 CONCENTRATION: 21 %
INR PPP: 0.93 (ref 0.9–1.1)
LYMPHOCYTES # BLD AUTO: 0.84 10*3/MM3 (ref 0.7–3.1)
LYMPHOCYTES NFR BLD AUTO: 12.8 % (ref 19.6–45.3)
Lab: ABNORMAL
MAGNESIUM SERPL-MCNC: 1.9 MG/DL (ref 1.6–2.4)
MCH RBC QN AUTO: 23.8 PG (ref 26.6–33)
MCHC RBC AUTO-ENTMCNC: 30.4 G/DL (ref 31.5–35.7)
MCV RBC AUTO: 78.4 FL (ref 79–97)
METHGB BLD QL: 0.1 % (ref 0–3)
MICROCYTES BLD QL: NORMAL
MODALITY: ABNORMAL
MONOCYTES # BLD AUTO: 0.5 10*3/MM3 (ref 0.1–0.9)
MONOCYTES NFR BLD AUTO: 7.6 % (ref 5–12)
NEUTROPHILS NFR BLD AUTO: 5.16 10*3/MM3 (ref 1.7–7)
NEUTROPHILS NFR BLD AUTO: 78.6 % (ref 42.7–76)
NOTE: ABNORMAL
NRBC BLD AUTO-RTO: 0 /100 WBC (ref 0–0.2)
NT-PROBNP SERPL-MCNC: <36 PG/ML (ref 0–900)
OXYHGB MFR BLDV: 95 % (ref 94–99)
PCO2 BLDA: 28.2 MM HG (ref 35–45)
PCO2 TEMP ADJ BLD: ABNORMAL MM[HG]
PH BLDA: 7.47 PH UNITS (ref 7.35–7.45)
PH, TEMP CORRECTED: ABNORMAL
PLAT MORPH BLD: NORMAL
PLATELET # BLD AUTO: 445 10*3/MM3 (ref 140–450)
PMV BLD AUTO: 8.1 FL (ref 6–12)
PO2 BLDA: 86.6 MM HG (ref 83–108)
PO2 TEMP ADJ BLD: ABNORMAL MM[HG]
POTASSIUM SERPL-SCNC: 3.7 MMOL/L (ref 3.5–5.2)
PROCALCITONIN SERPL-MCNC: 0.12 NG/ML (ref 0–0.25)
PROT SERPL-MCNC: 6.8 G/DL (ref 6–8.5)
PROTHROMBIN TIME: 12.6 SECONDS (ref 12.1–14.7)
QT INTERVAL: 290 MS
QTC INTERVAL: 444 MS
RBC # BLD AUTO: 4.58 10*6/MM3 (ref 3.77–5.28)
RH BLD: POSITIVE
SAO2 % BLDCOA: 95.6 % (ref 94–99)
SARS-COV-2 RNA RESP QL NAA+PROBE: NOT DETECTED
SODIUM SERPL-SCNC: 141 MMOL/L (ref 136–145)
T&S EXPIRATION DATE: NORMAL
T4 FREE SERPL-MCNC: 1.03 NG/DL (ref 0.93–1.7)
TARGETS BLD QL SMEAR: NORMAL
TROPONIN T SERPL-MCNC: <0.01 NG/ML (ref 0–0.03)
TROPONIN T SERPL-MCNC: <0.01 NG/ML (ref 0–0.03)
TSH SERPL DL<=0.05 MIU/L-ACNC: 1.92 UIU/ML (ref 0.27–4.2)
VENTILATOR MODE: ABNORMAL
WBC NRBC COR # BLD: 6.56 10*3/MM3 (ref 3.4–10.8)
WHOLE BLOOD HOLD COAG: NORMAL
WHOLE BLOOD HOLD SPECIMEN: NORMAL

## 2023-02-03 PROCEDURE — 25010000002 ONDANSETRON PER 1 MG: Performed by: STUDENT IN AN ORGANIZED HEALTH CARE EDUCATION/TRAINING PROGRAM

## 2023-02-03 PROCEDURE — 86140 C-REACTIVE PROTEIN: CPT | Performed by: PHYSICIAN ASSISTANT

## 2023-02-03 PROCEDURE — 80053 COMPREHEN METABOLIC PANEL: CPT | Performed by: PHYSICIAN ASSISTANT

## 2023-02-03 PROCEDURE — 85025 COMPLETE CBC W/AUTO DIFF WBC: CPT | Performed by: PHYSICIAN ASSISTANT

## 2023-02-03 PROCEDURE — 86900 BLOOD TYPING SEROLOGIC ABO: CPT | Performed by: EMERGENCY MEDICINE

## 2023-02-03 PROCEDURE — 84484 ASSAY OF TROPONIN QUANT: CPT | Performed by: PHYSICIAN ASSISTANT

## 2023-02-03 PROCEDURE — 83880 ASSAY OF NATRIURETIC PEPTIDE: CPT | Performed by: PHYSICIAN ASSISTANT

## 2023-02-03 PROCEDURE — 70553 MRI BRAIN STEM W/O & W/DYE: CPT

## 2023-02-03 PROCEDURE — 99222 1ST HOSP IP/OBS MODERATE 55: CPT | Performed by: PSYCHIATRY & NEUROLOGY

## 2023-02-03 PROCEDURE — 71045 X-RAY EXAM CHEST 1 VIEW: CPT | Performed by: RADIOLOGY

## 2023-02-03 PROCEDURE — 36415 COLL VENOUS BLD VENIPUNCTURE: CPT

## 2023-02-03 PROCEDURE — 85730 THROMBOPLASTIN TIME PARTIAL: CPT | Performed by: PHYSICIAN ASSISTANT

## 2023-02-03 PROCEDURE — 0 GADOBENATE DIMEGLUMINE 529 MG/ML SOLUTION: Performed by: STUDENT IN AN ORGANIZED HEALTH CARE EDUCATION/TRAINING PROGRAM

## 2023-02-03 PROCEDURE — 93005 ELECTROCARDIOGRAM TRACING: CPT | Performed by: EMERGENCY MEDICINE

## 2023-02-03 PROCEDURE — 36600 WITHDRAWAL OF ARTERIAL BLOOD: CPT

## 2023-02-03 PROCEDURE — 83036 HEMOGLOBIN GLYCOSYLATED A1C: CPT | Performed by: HOSPITALIST

## 2023-02-03 PROCEDURE — 83605 ASSAY OF LACTIC ACID: CPT | Performed by: PHYSICIAN ASSISTANT

## 2023-02-03 PROCEDURE — 93010 ELECTROCARDIOGRAM REPORT: CPT | Performed by: INTERNAL MEDICINE

## 2023-02-03 PROCEDURE — 86850 RBC ANTIBODY SCREEN: CPT | Performed by: EMERGENCY MEDICINE

## 2023-02-03 PROCEDURE — 84443 ASSAY THYROID STIM HORMONE: CPT | Performed by: PHYSICIAN ASSISTANT

## 2023-02-03 PROCEDURE — A9577 INJ MULTIHANCE: HCPCS | Performed by: STUDENT IN AN ORGANIZED HEALTH CARE EDUCATION/TRAINING PROGRAM

## 2023-02-03 PROCEDURE — 82375 ASSAY CARBOXYHB QUANT: CPT

## 2023-02-03 PROCEDURE — 70547 MR ANGIOGRAPHY NECK W/O DYE: CPT

## 2023-02-03 PROCEDURE — 70544 MR ANGIOGRAPHY HEAD W/O DYE: CPT

## 2023-02-03 PROCEDURE — 85007 BL SMEAR W/DIFF WBC COUNT: CPT | Performed by: PHYSICIAN ASSISTANT

## 2023-02-03 PROCEDURE — 82805 BLOOD GASES W/O2 SATURATION: CPT

## 2023-02-03 PROCEDURE — 83050 HGB METHEMOGLOBIN QUAN: CPT

## 2023-02-03 PROCEDURE — 87636 SARSCOV2 & INF A&B AMP PRB: CPT | Performed by: PHYSICIAN ASSISTANT

## 2023-02-03 PROCEDURE — 71045 X-RAY EXAM CHEST 1 VIEW: CPT

## 2023-02-03 PROCEDURE — 87040 BLOOD CULTURE FOR BACTERIA: CPT | Performed by: PHYSICIAN ASSISTANT

## 2023-02-03 PROCEDURE — 99285 EMERGENCY DEPT VISIT HI MDM: CPT

## 2023-02-03 PROCEDURE — 99223 1ST HOSP IP/OBS HIGH 75: CPT | Performed by: HOSPITALIST

## 2023-02-03 PROCEDURE — 84145 PROCALCITONIN (PCT): CPT | Performed by: PHYSICIAN ASSISTANT

## 2023-02-03 PROCEDURE — 84439 ASSAY OF FREE THYROXINE: CPT | Performed by: PHYSICIAN ASSISTANT

## 2023-02-03 PROCEDURE — 85652 RBC SED RATE AUTOMATED: CPT | Performed by: PHYSICIAN ASSISTANT

## 2023-02-03 PROCEDURE — 80074 ACUTE HEPATITIS PANEL: CPT | Performed by: HOSPITALIST

## 2023-02-03 PROCEDURE — 86901 BLOOD TYPING SEROLOGIC RH(D): CPT | Performed by: EMERGENCY MEDICINE

## 2023-02-03 PROCEDURE — 70450 CT HEAD/BRAIN W/O DYE: CPT

## 2023-02-03 PROCEDURE — 82962 GLUCOSE BLOOD TEST: CPT

## 2023-02-03 PROCEDURE — 85610 PROTHROMBIN TIME: CPT | Performed by: PHYSICIAN ASSISTANT

## 2023-02-03 PROCEDURE — 83735 ASSAY OF MAGNESIUM: CPT | Performed by: PHYSICIAN ASSISTANT

## 2023-02-03 RX ORDER — ONDANSETRON 2 MG/ML
4 INJECTION INTRAMUSCULAR; INTRAVENOUS ONCE
Status: COMPLETED | OUTPATIENT
Start: 2023-02-03 | End: 2023-02-03

## 2023-02-03 RX ORDER — SODIUM CHLORIDE 0.9 % (FLUSH) 0.9 %
10 SYRINGE (ML) INJECTION AS NEEDED
Status: DISCONTINUED | OUTPATIENT
Start: 2023-02-03 | End: 2023-02-07 | Stop reason: HOSPADM

## 2023-02-03 RX ORDER — SODIUM CHLORIDE 9 MG/ML
125 INJECTION, SOLUTION INTRAVENOUS CONTINUOUS
Status: DISCONTINUED | OUTPATIENT
Start: 2023-02-03 | End: 2023-02-05

## 2023-02-03 RX ADMIN — SODIUM CHLORIDE 2313 ML: 9 INJECTION, SOLUTION INTRAVENOUS at 19:17

## 2023-02-03 RX ADMIN — ONDANSETRON 4 MG: 2 INJECTION INTRAMUSCULAR; INTRAVENOUS at 22:50

## 2023-02-03 RX ADMIN — GADOBENATE DIMEGLUMINE 15 ML: 529 INJECTION, SOLUTION INTRAVENOUS at 23:43

## 2023-02-03 RX ADMIN — SODIUM CHLORIDE 125 ML/HR: 9 INJECTION, SOLUTION INTRAVENOUS at 19:49

## 2023-02-03 NOTE — ED NOTES
Pt seen in the lobby and update on time to room. Pt states no change in condition and that she does need anything at this time.  Pt present with nasd at this time and was educated on presenting to triage nurse if condition changes.  Pt verbalized understanding

## 2023-02-03 NOTE — ED NOTES
MEDICAL SCREENING:    Reason for Visit: sent by Dr. Peoples for elevated HR and concern for sepsis, patient complains of dizziness and sob.    Patient initially seen in triage.  The patient was advised further evaluation and diagnostic testing will be needed, some of the treatment and testing will be initiated in the lobby in order to begin the process.  The patient will be returned to the waiting area for the time being and possibly be re-assessed by a subsequent ED provider.  The patient will be brought back to the treatment area in as timely manner as possible.       Jovita Horne, PA  02/03/23 8103

## 2023-02-03 NOTE — ED PROVIDER NOTES
Subjective   History of Present Illness  Patient is a 62-year-old female who was sent from her cardiology appointment to the ED secondary to tachycardia.  She states that about 3 weeks ago she started having syncopal episodes, states that she has had a total of 5 or 6 of these.  She states that she was seen here, got referred to cardiology and went there for that appointment today when she was once again referred to the ED.  While in the waiting room, approximately 1 hour ago, she developed weakness in the right side of her face, right arm, right leg, some vague numbness here but also some tingling in both hands and both feet.  No speech disturbance.  No visual disturbance.  No chest pain.  She has felt short of breath at times.  She denies other symptoms or other complaints.      Review of Systems   All other systems reviewed and are negative.      Past Medical History:   Diagnosis Date   • Shoulder fracture        Allergies   Allergen Reactions   • Vancomycin Rash       Past Surgical History:   Procedure Laterality Date   • BLADDER REPAIR     • CHOLECYSTECTOMY     • GASTRIC BYPASS     • HYSTERECTOMY     • LASIK     • LUMBAR DISC SURGERY      L5 fusion   • ORIF HUMERUS FRACTURE Right 8/30/2020    Procedure: RIGHT SHOULDER HUMERUS PROXIMAL OPEN REDUCTION INTERNAL FIXATION WITH PLATE AND SCREW;  Surgeon: Eleazar Núñez MD;  Location: [facility];  Service: Orthopedics;  Laterality: Right;   • TOTAL SHOULDER REPLACEMENT      right        Family History   Problem Relation Age of Onset   • Stomach cancer Mother    • No Known Problems Father        Social History     Socioeconomic History   • Marital status:    Tobacco Use   • Smoking status: Never   • Smokeless tobacco: Never   Substance and Sexual Activity   • Alcohol use: No   • Drug use: No   • Sexual activity: Defer           Objective   Physical Exam  Vitals and nursing note reviewed.   Constitutional:       Appearance: Normal appearance. She is not  toxic-appearing or diaphoretic.      Comments: Well-developed well-nourished female, alert and well-oriented, does not appear to be in acute distress.   HENT:      Head: Normocephalic and atraumatic.   Eyes:      General: No scleral icterus.     Pupils: Pupils are equal, round, and reactive to light.   Neck:      Trachea: No tracheal deviation.   Cardiovascular:      Rate and Rhythm: Normal rate and regular rhythm.   Pulmonary:      Effort: Pulmonary effort is normal. No respiratory distress.      Breath sounds: Normal breath sounds.   Chest:      Chest wall: No tenderness.   Abdominal:      General: Bowel sounds are normal.      Palpations: Abdomen is soft.      Tenderness: There is no abdominal tenderness. There is no guarding or rebound.   Musculoskeletal:         General: No tenderness. Normal range of motion.      Cervical back: Normal range of motion and neck supple. No rigidity or tenderness.      Right lower leg: No edema.      Left lower leg: No edema.   Skin:     General: Skin is warm and dry.      Capillary Refill: Capillary refill takes less than 2 seconds.      Coloration: Skin is not pale.   Neurological:      Mental Status: She is alert and oriented to person, place, and time.      GCS: GCS eye subscore is 4. GCS verbal subscore is 5. GCS motor subscore is 6.      Motor: No abnormal muscle tone.      Comments: Perhaps some mild right facial droop.  Drift of the right arm, does not go to the bed.  Drift of the right leg, does go to the bed.  Mildly diminished light touch sensation on the right side.  Some ataxia.  NIH stroke scale is 5.   Psychiatric:         Mood and Affect: Mood normal.         Behavior: Behavior normal.         Procedures  EKG shows sinus tachycardia, rate 141.  ID interval 120, QRS duration 60, QTc 444 ms.  Baseline artifact.  No apparent acute ischemia.  No evidence for STEMI.         ED Course  ED Course as of 02/04/23 0058 Fri Feb 03, 2023 1827 Case discussed with stroke  neurology Dr. Elena.  He will see the patient remotely when she returns from [place]. [CM]   1859 Dr. Wilson has seen the patient, does not feel that she has a stroke.  He recommends to treat her tachycardia, hypertension and anxiety.  If symptoms persist however, he does recommend MRI. [CM]   1910 Case discussed with and care endorsed to Dr. Haro at shift change.  Electronically signed by Khurram Bauer MD, 02/03/23, 7:11 PM EST.   [CM]   1934 Patient reassessed, resting comfortably, heart rate in the 120s.  Endorses tingling of her bilateral toes and generalized fatigue and weakness mildly worse on the right. [KP]   2013 Discussed admission with hospitalist Dr. Alan.  Admission deferred until MRI complete. [KP]   2338 MRI Angiogram Head Without Contrast  1.  No flow-limiting stenosis or occlusion. No cerebral aneurysm.  2.  CT abnormality is not well evaluated on MRA. [KP]   Sat Feb 04, 2023   0022 MRI Angiogram Neck Without Contrast  No flow-limiting stenosis or occlusion. [KP]   0022 MRI Brain With & Without Contrast  1.  No acute infarct, hemorrhage, mass effect, or hydrocephalus.  2.  0.7 x 0.5 cm indeterminate T2/FLAIR hyperintense lesion along the anterior chrissy with associated susceptibility artifact and no appreciable enhancement corresponding to abnormality seen on CT. Recommend follow-up study in 3-6 months to document  stability or change. Subsequent study can be done with MR IAC protocol with and without contrast with thin slices through the brainstem. [KP]   0057 Case discussed with and admitted to hospitalist Dr. Alan. [KP]      ED Course User Index  [CM] Khurram Bauer MD  [KP] Marco Haro MD                                           Medical Decision Making  Rapid resting heart rate: acute illness or injury  Stroke-like symptoms: acute illness or injury  Amount and/or Complexity of Data Reviewed  Labs: ordered.  Radiology: ordered. Decision-making details documented in ED  Course.  ECG/medicine tests: ordered.      Risk  Prescription drug management.  Decision regarding hospitalization.          Final diagnoses:   Rapid resting heart rate   Stroke-like symptoms       ED Disposition  ED Disposition     ED Disposition   Decision to Admit    Condition   --    Comment   Level of Care: Telemetry [5]   Diagnosis: Syncope [064915]   Admitting Physician: JUSTINE BLACK [[id number]]   Attending Physician: JUSTINE BLACK [[id number]]               No follow-up provider specified.       Medication List      No changes were made to your prescriptions during this visit.          Marco Haro MD  02/04/23 0058

## 2023-02-03 NOTE — ED NOTES
Another patient from the lobby came up to triage and said this patient needs to be seen. Patient was reassessed in the [facility]. During reassessment, patient showed a picture on her phone of herself with what appeared to be facial droop. When patient was asked when the picture was taken she said this morning at her doctor's office. Patient then reported a history of strokes. Patient's chief complaint upon arrival was that she had been sent from her PCP's office to be admitted for high heart rate. Patient did not mention facial droop/stroke symptoms in triage upon arrival. Patient did not appear to have any obvious facial droop or focal deficit upon arrival. ED provider notified.

## 2023-02-03 NOTE — CONSULTS
[facility]   Teleneurology Note    Patient Name: Ana Laura Jones  : 1960  MRN: [id number]  Primary Care Physician: Annita Adames APRN  Referring Site: [facility]  Location of Neurologist: [facility]    Subjective   Teleneurology Initial Data           Neurologist Evaluation Date: 23 Neurologist Evaluation Time:    Date Last Known Well: 23 Time Last Known Well: 1800     History       63 yo sent in from clinic for tachycardia, was in waiting room then started to have general weakness, maybe more on right. There is also trembling, bilateral tingling in arms and tingling in face area.   LKW around 6 pm in the triage waiting.    Stroke Risk Factors/ Pertinent Data     Stroke risk factors: none  Anticoagulants prior to arrival: none  Antiplatelets prior to arrival: none     Pre- Stroke Modified Foster Scale     Pre-Stroke Modified Roosevelt Scale: 0 - No Symptoms at all.    NIH Stroke Scale     NIHSS Performed Date: 23 NIHSS Performed Time:    Interval: baseline  1a. Level of Consciousness: 0-->Alert, keenly responsive  1b. LOC Questions: 0-->Answers both questions correctly  1c. LOC Commands: 0-->Performs both tasks correctly  2. Best Gaze: 0-->Normal  3. Visual: 0-->No visual loss  4. Facial Palsy: 1-->Minor paralysis (flattened nasolabial fold, asymmetry on smiling)  5a. Motor Arm, Left: 0-->No drift, limb holds 90 (or 45) degrees for full 10 secs  5b. Motor Arm, Right: 0-->No drift, limb holds 90 (or 45) degrees for full 10 secs  6a. Motor Leg, Left: 0-->No drift, leg holds 30 degree position for full 5 secs  6b. Motor Leg, Right: 0-->No drift, leg holds 30 degree position for full 5 secs  7. Limb Ataxia: 0-->Absent  8. Sensory: 0-->Normal, no sensory loss  9. Best Language: 0-->No aphasia, normal  10. Dysarthria: 0-->Normal  11. Extinction and Inattention (formerly Neglect): 0-->No abnormality  Total (NIH Stroke Scale): 4     Review of Systems     Review of Systems  Neg other than  above  Objective   Exam     Exam performed with the help of support staff from the referring site  Neurological Exam  Alert awake no aphasia  There is right face trembling on smile, there is no obvious droop at rest  Right arm and leg are trembling, but able to hold against gravity well.  Result Review    Results          Personal review of CNS imaging:(Official report by radiologist pending)  Imaging  CT Imaging Review: CT Imaging reviewed, NO acute infarct/ hemorrhage seen  ASPECTS Involved Locations: none  ASPECTS Score: 10    Thrombolytic   Thrombolytics: tPA not given  tPA Consent: Discussed the risk benefit and alternative treaments with patient's family and given verbal consent with witness.  Tissue Plasminogen Activator (tPA) Relative Exclusions for All Patients: Only minor non-disabling symptoms     Assessment & Plan   Assessment/ Plan     Assessment:  Acute Stroke Evaluation: Stroke not suspected/ Other (Specify)- the risks of IV thrombolytic outweigh the benefits of treatment     Likely not stroke, anxiety vs HTN vs tachycardia related  Ok to treat medically, if improved then no further neuro workup  If no improvement ok to get MRI r/o strokes    Dispo per ED if MRI negative         Disposition     Disposition: The patient will remain at the referring institution for further evaluation and management    Medical Decision Making  Medical Data Reviewed: Data reviewed including: clinical labs, radiology and/or medical tests, Independent review of CNS images  Length of visit: 20 minutes    I, Macario Elena MD, saw the patient on 02/03/23 at 1856 for an initial in-patient or emergency room telememedicine face to face consult using interactive technology for 20 minutes. The location of the patient was [place]. I was located at [place]. I was assisted with the exam by  .    I have proceeded with this evaluation at the request of the referring practitioner as it is felt to be an emergency setting and no  appropriate specialist is available to perform this evaluation. [facility] has reported that this is the correct patient and has obtained consent from the patient/surrogate to perform this telemedicine evaluation(including obtaining history, performing examination and reviewing data provided by the patient an/or originating site of care provider)    I have introduced myself to the patient, provided my credentials, disclosed my location, and determined that, based on review of the patient's chart and discussion with the patient's primary team, telemedicine via a HIPAA compliant, real-time, face-to-face two-way, interactive audio and video platform is an appropriate and effective means of providing the service.    The patient/surrogate has a right to refuse this evaluation as they have been explained risks including potential loss of confidentiality, benefits, alternatives, and the potential need for subsequent face-to-face care. In this evaluation, we will be providing recommendations only.  The ultimate decision to follow or not to follow these recommendations will be left to the bedside treating/requesting practitioner.    The patient/surrogate has been notified that other healthcare professionals including technical person may be involved in this A/V evaluation.  All laws concerning confidentiality and patient access to medical records and copies of medical records apply to telemedicine.  The patient/surrogate has received the originating [facility] Notice of Privacy Practices.    Macario Elena MD

## 2023-02-04 ENCOUNTER — APPOINTMENT (OUTPATIENT)
Dept: CARDIOLOGY | Facility: HOSPITAL | Age: 63
DRG: 312 | End: 2023-02-04
Payer: MEDICARE

## 2023-02-04 ENCOUNTER — APPOINTMENT (OUTPATIENT)
Dept: CT IMAGING | Facility: HOSPITAL | Age: 63
DRG: 312 | End: 2023-02-04
Payer: MEDICARE

## 2023-02-04 PROBLEM — R55 SYNCOPE: Status: ACTIVE | Noted: 2023-02-04

## 2023-02-04 LAB
ALBUMIN SERPL-MCNC: 2.8 G/DL (ref 3.5–5.2)
ALBUMIN/GLOB SERPL: 1 G/DL
ALP SERPL-CCNC: 109 U/L (ref 39–117)
ALT SERPL W P-5'-P-CCNC: 68 U/L (ref 1–33)
ANION GAP SERPL CALCULATED.3IONS-SCNC: 9.3 MMOL/L (ref 5–15)
ANISOCYTOSIS BLD QL: NORMAL
AST SERPL-CCNC: 105 U/L (ref 1–32)
BASOPHILS # BLD AUTO: 0.02 10*3/MM3 (ref 0–0.2)
BASOPHILS NFR BLD AUTO: 0.4 % (ref 0–1.5)
BH CV ECHO MEAS - ACS: 1.8 CM
BH CV ECHO MEAS - AO MAX PG: 9.9 MMHG
BH CV ECHO MEAS - AO MEAN PG: 5 MMHG
BH CV ECHO MEAS - AO ROOT DIAM: 2.9 CM
BH CV ECHO MEAS - AO V2 MAX: 157 CM/SEC
BH CV ECHO MEAS - AO V2 VTI: 26.2 CM
BH CV ECHO MEAS - EDV(CUBED): 50.7 ML
BH CV ECHO MEAS - EDV(MOD-SP4): 61.6 ML
BH CV ECHO MEAS - EF(MOD-BP): 64 %
BH CV ECHO MEAS - EF(MOD-SP4): 64.4 %
BH CV ECHO MEAS - ESV(CUBED): 14.9 ML
BH CV ECHO MEAS - ESV(MOD-SP4): 21.9 ML
BH CV ECHO MEAS - FS: 33.5 %
BH CV ECHO MEAS - IVS/LVPW: 0.92 CM
BH CV ECHO MEAS - IVSD: 0.95 CM
BH CV ECHO MEAS - LA DIMENSION: 3.2 CM
BH CV ECHO MEAS - LAT PEAK E' VEL: 8.2 CM/SEC
BH CV ECHO MEAS - LV DIASTOLIC VOL/BSA (35-75): 32 CM2
BH CV ECHO MEAS - LV MASS(C)D: 111.4 GRAMS
BH CV ECHO MEAS - LV SYSTOLIC VOL/BSA (12-30): 11.4 CM2
BH CV ECHO MEAS - LVIDD: 3.7 CM
BH CV ECHO MEAS - LVIDS: 2.46 CM
BH CV ECHO MEAS - LVOT AREA: 3.1 CM2
BH CV ECHO MEAS - LVOT DIAM: 2 CM
BH CV ECHO MEAS - LVPWD: 1.04 CM
BH CV ECHO MEAS - MED PEAK E' VEL: 7.4 CM/SEC
BH CV ECHO MEAS - MV A MAX VEL: 116 CM/SEC
BH CV ECHO MEAS - MV E MAX VEL: 85.9 CM/SEC
BH CV ECHO MEAS - MV E/A: 0.74
BH CV ECHO MEAS - PA ACC TIME: 0.11 SEC
BH CV ECHO MEAS - PA PR(ACCEL): 31.3 MMHG
BH CV ECHO MEAS - RAP SYSTOLE: 10 MMHG
BH CV ECHO MEAS - RVSP: 46.7 MMHG
BH CV ECHO MEAS - SI(MOD-SP4): 20.6 ML/M2
BH CV ECHO MEAS - SV(MOD-SP4): 39.7 ML
BH CV ECHO MEAS - TAPSE (>1.6): 2.32 CM
BH CV ECHO MEAS - TR MAX PG: 36.7 MMHG
BH CV ECHO MEAS - TR MAX VEL: 303 CM/SEC
BH CV ECHO MEASUREMENTS AVERAGE E/E' RATIO: 11.01
BILIRUB SERPL-MCNC: 0.6 MG/DL (ref 0–1.2)
BUN SERPL-MCNC: 5 MG/DL (ref 8–23)
BUN/CREAT SERPL: 10.9 (ref 7–25)
CALCIUM SPEC-SCNC: 7.5 MG/DL (ref 8.6–10.5)
CHLORIDE SERPL-SCNC: 105 MMOL/L (ref 98–107)
CHOLEST SERPL-MCNC: 177 MG/DL (ref 0–200)
CO2 SERPL-SCNC: 21.7 MMOL/L (ref 22–29)
CREAT SERPL-MCNC: 0.46 MG/DL (ref 0.57–1)
D-LACTATE SERPL-SCNC: 1.8 MMOL/L (ref 0.5–2)
D-LACTATE SERPL-SCNC: 2.3 MMOL/L (ref 0.5–2)
DEPRECATED RDW RBC AUTO: 65.2 FL (ref 37–54)
EGFRCR SERPLBLD CKD-EPI 2021: 109.7 ML/MIN/1.73
EOSINOPHIL # BLD AUTO: 0.01 10*3/MM3 (ref 0–0.4)
EOSINOPHIL NFR BLD AUTO: 0.2 % (ref 0.3–6.2)
ERYTHROCYTE [DISTWIDTH] IN BLOOD BY AUTOMATED COUNT: 22.9 % (ref 12.3–15.4)
GLOBULIN UR ELPH-MCNC: 2.8 GM/DL
GLUCOSE SERPL-MCNC: 92 MG/DL (ref 65–99)
HAV IGM SERPL QL IA: NORMAL
HBA1C MFR BLD: 4.6 % (ref 4.8–5.6)
HBV CORE IGM SERPL QL IA: NORMAL
HBV SURFACE AG SERPL QL IA: NORMAL
HCT VFR BLD AUTO: 31.1 % (ref 34–46.6)
HCV AB SER DONR QL: NORMAL
HDLC SERPL-MCNC: 63 MG/DL (ref 40–60)
HGB BLD-MCNC: 8.9 G/DL (ref 12–15.9)
HYPOCHROMIA BLD QL: NORMAL
IMM GRANULOCYTES # BLD AUTO: 0.02 10*3/MM3 (ref 0–0.05)
IMM GRANULOCYTES NFR BLD AUTO: 0.4 % (ref 0–0.5)
LDLC SERPL CALC-MCNC: 105 MG/DL (ref 0–100)
LDLC/HDLC SERPL: 1.67 {RATIO}
LYMPHOCYTES # BLD AUTO: 0.77 10*3/MM3 (ref 0.7–3.1)
LYMPHOCYTES NFR BLD AUTO: 13.7 % (ref 19.6–45.3)
MAGNESIUM SERPL-MCNC: 1.8 MG/DL (ref 1.6–2.4)
MAXIMAL PREDICTED HEART RATE: 160 BPM
MCH RBC QN AUTO: 23.1 PG (ref 26.6–33)
MCHC RBC AUTO-ENTMCNC: 28.6 G/DL (ref 31.5–35.7)
MCV RBC AUTO: 80.6 FL (ref 79–97)
MONOCYTES # BLD AUTO: 0.53 10*3/MM3 (ref 0.1–0.9)
MONOCYTES NFR BLD AUTO: 9.4 % (ref 5–12)
NEUTROPHILS NFR BLD AUTO: 4.28 10*3/MM3 (ref 1.7–7)
NEUTROPHILS NFR BLD AUTO: 75.9 % (ref 42.7–76)
NRBC BLD AUTO-RTO: 0 /100 WBC (ref 0–0.2)
PLAT MORPH BLD: NORMAL
PLATELET # BLD AUTO: 359 10*3/MM3 (ref 140–450)
PMV BLD AUTO: 8.5 FL (ref 6–12)
POTASSIUM SERPL-SCNC: 4 MMOL/L (ref 3.5–5.2)
PROT SERPL-MCNC: 5.6 G/DL (ref 6–8.5)
QT INTERVAL: 306 MS
QTC INTERVAL: 463 MS
RBC # BLD AUTO: 3.86 10*6/MM3 (ref 3.77–5.28)
SODIUM SERPL-SCNC: 136 MMOL/L (ref 136–145)
STRESS TARGET HR: 136 BPM
TRIGL SERPL-MCNC: 45 MG/DL (ref 0–150)
VLDLC SERPL-MCNC: 9 MG/DL (ref 5–40)
WBC NRBC COR # BLD: 5.63 10*3/MM3 (ref 3.4–10.8)

## 2023-02-04 PROCEDURE — G0378 HOSPITAL OBSERVATION PER HR: HCPCS

## 2023-02-04 PROCEDURE — 25010000002 MAGNESIUM SULFATE 2 GM/50ML SOLUTION: Performed by: HOSPITALIST

## 2023-02-04 PROCEDURE — 25010000002 PROCHLORPERAZINE 10 MG/2ML SOLUTION: Performed by: HOSPITALIST

## 2023-02-04 PROCEDURE — 85025 COMPLETE CBC W/AUTO DIFF WBC: CPT | Performed by: HOSPITALIST

## 2023-02-04 PROCEDURE — 93306 TTE W/DOPPLER COMPLETE: CPT

## 2023-02-04 PROCEDURE — 99232 SBSQ HOSP IP/OBS MODERATE 35: CPT | Performed by: STUDENT IN AN ORGANIZED HEALTH CARE EDUCATION/TRAINING PROGRAM

## 2023-02-04 PROCEDURE — 83605 ASSAY OF LACTIC ACID: CPT | Performed by: PHYSICIAN ASSISTANT

## 2023-02-04 PROCEDURE — 25010000002 DIPHENHYDRAMINE PER 50 MG: Performed by: STUDENT IN AN ORGANIZED HEALTH CARE EDUCATION/TRAINING PROGRAM

## 2023-02-04 PROCEDURE — 85007 BL SMEAR W/DIFF WBC COUNT: CPT | Performed by: HOSPITALIST

## 2023-02-04 PROCEDURE — 93005 ELECTROCARDIOGRAM TRACING: CPT | Performed by: HOSPITALIST

## 2023-02-04 PROCEDURE — 80053 COMPREHEN METABOLIC PANEL: CPT | Performed by: HOSPITALIST

## 2023-02-04 PROCEDURE — 99232 SBSQ HOSP IP/OBS MODERATE 35: CPT | Performed by: PSYCHIATRY & NEUROLOGY

## 2023-02-04 PROCEDURE — 93010 ELECTROCARDIOGRAM REPORT: CPT | Performed by: INTERNAL MEDICINE

## 2023-02-04 PROCEDURE — 25010000002 HEPARIN (PORCINE) PER 1000 UNITS: Performed by: HOSPITALIST

## 2023-02-04 PROCEDURE — 74176 CT ABD & PELVIS W/O CONTRAST: CPT

## 2023-02-04 PROCEDURE — 99221 1ST HOSP IP/OBS SF/LOW 40: CPT | Performed by: INTERNAL MEDICINE

## 2023-02-04 PROCEDURE — 83735 ASSAY OF MAGNESIUM: CPT | Performed by: HOSPITALIST

## 2023-02-04 PROCEDURE — 93306 TTE W/DOPPLER COMPLETE: CPT | Performed by: INTERNAL MEDICINE

## 2023-02-04 PROCEDURE — 25010000002 MAGNESIUM SULFATE 2 GM/50ML SOLUTION: Performed by: STUDENT IN AN ORGANIZED HEALTH CARE EDUCATION/TRAINING PROGRAM

## 2023-02-04 PROCEDURE — 80061 LIPID PANEL: CPT | Performed by: HOSPITALIST

## 2023-02-04 PROCEDURE — 25010000002 PROCHLORPERAZINE 10 MG/2ML SOLUTION: Performed by: STUDENT IN AN ORGANIZED HEALTH CARE EDUCATION/TRAINING PROGRAM

## 2023-02-04 PROCEDURE — 93005 ELECTROCARDIOGRAM TRACING: CPT | Performed by: STUDENT IN AN ORGANIZED HEALTH CARE EDUCATION/TRAINING PROGRAM

## 2023-02-04 RX ORDER — MAGNESIUM SULFATE HEPTAHYDRATE 40 MG/ML
4 INJECTION, SOLUTION INTRAVENOUS AS NEEDED
Status: DISCONTINUED | OUTPATIENT
Start: 2023-02-04 | End: 2023-02-07 | Stop reason: HOSPADM

## 2023-02-04 RX ORDER — SODIUM CHLORIDE 9 MG/ML
40 INJECTION, SOLUTION INTRAVENOUS AS NEEDED
Status: DISCONTINUED | OUTPATIENT
Start: 2023-02-04 | End: 2023-02-07 | Stop reason: HOSPADM

## 2023-02-04 RX ORDER — HEPARIN SODIUM 5000 [USP'U]/ML
5000 INJECTION, SOLUTION INTRAVENOUS; SUBCUTANEOUS EVERY 12 HOURS SCHEDULED
Status: DISCONTINUED | OUTPATIENT
Start: 2023-02-04 | End: 2023-02-07 | Stop reason: HOSPADM

## 2023-02-04 RX ORDER — CHOLECALCIFEROL (VITAMIN D3) 125 MCG
5 CAPSULE ORAL NIGHTLY PRN
Status: CANCELLED | OUTPATIENT
Start: 2023-02-04

## 2023-02-04 RX ORDER — PROCHLORPERAZINE EDISYLATE 5 MG/ML
2.5 INJECTION INTRAMUSCULAR; INTRAVENOUS EVERY 6 HOURS PRN
Status: DISCONTINUED | OUTPATIENT
Start: 2023-02-04 | End: 2023-02-07 | Stop reason: HOSPADM

## 2023-02-04 RX ORDER — MAGNESIUM SULFATE HEPTAHYDRATE 40 MG/ML
2 INJECTION, SOLUTION INTRAVENOUS ONCE
Status: COMPLETED | OUTPATIENT
Start: 2023-02-04 | End: 2023-02-04

## 2023-02-04 RX ORDER — SODIUM CHLORIDE 0.9 % (FLUSH) 0.9 %
10 SYRINGE (ML) INJECTION EVERY 12 HOURS SCHEDULED
Status: DISCONTINUED | OUTPATIENT
Start: 2023-02-04 | End: 2023-02-07 | Stop reason: HOSPADM

## 2023-02-04 RX ORDER — PROCHLORPERAZINE EDISYLATE 5 MG/ML
5 INJECTION INTRAMUSCULAR; INTRAVENOUS ONCE
Status: COMPLETED | OUTPATIENT
Start: 2023-02-04 | End: 2023-02-04

## 2023-02-04 RX ORDER — SODIUM CHLORIDE 0.9 % (FLUSH) 0.9 %
10 SYRINGE (ML) INJECTION AS NEEDED
Status: DISCONTINUED | OUTPATIENT
Start: 2023-02-04 | End: 2023-02-07 | Stop reason: HOSPADM

## 2023-02-04 RX ORDER — NITROGLYCERIN 0.4 MG/1
0.4 TABLET SUBLINGUAL
Status: DISCONTINUED | OUTPATIENT
Start: 2023-02-04 | End: 2023-02-07 | Stop reason: HOSPADM

## 2023-02-04 RX ORDER — DIPHENHYDRAMINE HYDROCHLORIDE 50 MG/ML
25 INJECTION INTRAMUSCULAR; INTRAVENOUS ONCE
Status: COMPLETED | OUTPATIENT
Start: 2023-02-04 | End: 2023-02-04

## 2023-02-04 RX ORDER — LOSARTAN POTASSIUM 25 MG/1
25 TABLET ORAL
Status: DISCONTINUED | OUTPATIENT
Start: 2023-02-04 | End: 2023-02-05

## 2023-02-04 RX ORDER — MAGNESIUM SULFATE HEPTAHYDRATE 40 MG/ML
2 INJECTION, SOLUTION INTRAVENOUS AS NEEDED
Status: DISCONTINUED | OUTPATIENT
Start: 2023-02-04 | End: 2023-02-07 | Stop reason: HOSPADM

## 2023-02-04 RX ORDER — MAGNESIUM SULFATE 1 G/100ML
1 INJECTION INTRAVENOUS AS NEEDED
Status: DISCONTINUED | OUTPATIENT
Start: 2023-02-04 | End: 2023-02-07 | Stop reason: HOSPADM

## 2023-02-04 RX ORDER — PANTOPRAZOLE SODIUM 40 MG/1
40 TABLET, DELAYED RELEASE ORAL
Status: DISCONTINUED | OUTPATIENT
Start: 2023-02-04 | End: 2023-02-07 | Stop reason: HOSPADM

## 2023-02-04 RX ORDER — METOPROLOL TARTRATE 5 MG/5ML
5 INJECTION INTRAVENOUS ONCE
Status: COMPLETED | OUTPATIENT
Start: 2023-02-04 | End: 2023-02-04

## 2023-02-04 RX ADMIN — METOPROLOL TARTRATE 12.5 MG: 25 TABLET, FILM COATED ORAL at 04:12

## 2023-02-04 RX ADMIN — Medication 10 ML: at 21:50

## 2023-02-04 RX ADMIN — LOSARTAN POTASSIUM 25 MG: 25 TABLET, FILM COATED ORAL at 10:54

## 2023-02-04 RX ADMIN — DIPHENHYDRAMINE HYDROCHLORIDE 25 MG: 50 INJECTION, SOLUTION INTRAMUSCULAR; INTRAVENOUS at 00:46

## 2023-02-04 RX ADMIN — MAGNESIUM SULFATE HEPTAHYDRATE 2 G: 40 INJECTION, SOLUTION INTRAVENOUS at 08:52

## 2023-02-04 RX ADMIN — PROCHLORPERAZINE EDISYLATE 2.5 MG: 5 INJECTION, SOLUTION INTRAMUSCULAR; INTRAVENOUS at 17:04

## 2023-02-04 RX ADMIN — METOPROLOL TARTRATE 25 MG: 25 TABLET, FILM COATED ORAL at 21:50

## 2023-02-04 RX ADMIN — HEPARIN SODIUM 5000 UNITS: 5000 INJECTION INTRAVENOUS; SUBCUTANEOUS at 21:50

## 2023-02-04 RX ADMIN — HEPARIN SODIUM 5000 UNITS: 5000 INJECTION INTRAVENOUS; SUBCUTANEOUS at 08:52

## 2023-02-04 RX ADMIN — PROCHLORPERAZINE EDISYLATE 2.5 MG: 5 INJECTION, SOLUTION INTRAMUSCULAR; INTRAVENOUS at 04:24

## 2023-02-04 RX ADMIN — MAGNESIUM SULFATE HEPTAHYDRATE 2 G: 40 INJECTION, SOLUTION INTRAVENOUS at 14:32

## 2023-02-04 RX ADMIN — SODIUM CHLORIDE 125 ML/HR: 9 INJECTION, SOLUTION INTRAVENOUS at 19:09

## 2023-02-04 RX ADMIN — PROCHLORPERAZINE EDISYLATE 2.5 MG: 5 INJECTION, SOLUTION INTRAMUSCULAR; INTRAVENOUS at 10:42

## 2023-02-04 RX ADMIN — PROCHLORPERAZINE EDISYLATE 5 MG: 5 INJECTION INTRAMUSCULAR; INTRAVENOUS at 00:47

## 2023-02-04 RX ADMIN — METOPROLOL TARTRATE 5 MG: 1 INJECTION, SOLUTION INTRAVENOUS at 04:24

## 2023-02-04 RX ADMIN — SODIUM CHLORIDE 125 ML/HR: 9 INJECTION, SOLUTION INTRAVENOUS at 10:33

## 2023-02-04 RX ADMIN — PANTOPRAZOLE SODIUM 40 MG: 40 TABLET, DELAYED RELEASE ORAL at 04:13

## 2023-02-04 RX ADMIN — PROCHLORPERAZINE EDISYLATE 2.5 MG: 5 INJECTION, SOLUTION INTRAMUSCULAR; INTRAVENOUS at 23:30

## 2023-02-04 NOTE — PLAN OF CARE
Pt rested in bed this shift. Pt complained of nausea; PRN medications given; see MAR. Pt had SVT this shift; MD notified; see orders. Will continue plan of care.

## 2023-02-04 NOTE — CONSULTS
Teleneurology Stroke Follow up/Progress Note    Date of follow up: 2/4/2023  Attending Physician: Monroe Pereira DO  Current Hospital day: Hospital Day: 2  Location: Floor  Date/Time Telestroke doctor on video: 2/4/2023  1139 ET    Impression:   63 y/o F who presents with multiple episodes of syncope in the last few weeks, with UTI and was THC positive on UDS, was put on antibiotics and told to stop taking gummies. She presented today and while in ED had transient episode of R arm weakness, which has resolved. She has been tachycardic. MRI brain did not show acute ischemic stroke however shows T2 flair pontine lesion that could be artifact or chronic infarct. At this time her exam is at baseline, NIH 0, no neuro deficits. She states she had a pontine stroke in 2011.    D/dx: Transient neuro deficits, resolved, unclear etiology 2/2 anxiety vs orthostasis vs syncope vs less likely TIA    Recommendation:   -F/u MRI brain without holli in 3-6 months  -She has not been on aspirin since her stroke in 2011, no history of GI bleed. She should take ASA 81mg daily for secondary stroke prevention  - F/u 2D echo for syncope workup  -F/u PCP and neurology as outpatient  -Check LDL, if LDL > 70, start atorvastatin 40mg for secondary stroke preventoin      Christopher Norton MD  We will sign off.  If you have any questions about the patient recommendations, please call [phone] at anytime and ask to speak with the neurologist on call. Press 1 for an emergent consult and 2 for a non-emergent consult.    Interval History:   Patient's exam is at her baseline. No neuro deficits. No new complaints.    Medication:   Current Facility-Administered Medications   Medication Dose Route Frequency Provider Last Rate Last Admin   • heparin (porcine) 5000 UNIT/ML injection 5,000 Units  5,000 Units Subcutaneous Q12H Rodriguez Alan MD       • Magnesium Sulfate - Total Dose 4 grams - Magnesium 1 or Less  4 g Intravenous PRN Rodriguez Alan  MD Bret        Or   • Magnesium Sulfate - Total Dose 3 grams - Magnesium 1.1 - 1.5  1 g Intravenous PRN Rodriguez Alan MD        Or   • Magnesium Sulfate - Total Dose 2 grams - Magnesium 1.6 - 1.9  2 g Intravenous PRN Rodriguez Alan MD       • Magnesium Sulfate - Total Dose 2 grams - Magnesium 1.6 - 1.9  2 g Intravenous Once Rodriguez Alan MD       • metoprolol tartrate (LOPRESSOR) tablet 12.5 mg  12.5 mg Oral Q12H Rodriguez Alan MD   12.5 mg at 02/04/23 0412   • nitroglycerin (NITROSTAT) SL tablet 0.4 mg  0.4 mg Sublingual Q5 Min PRN Rodriguez Alan MD       • pantoprazole (PROTONIX) EC tablet 40 mg  40 mg Oral Q AM Rodriguez Alan MD   40 mg at 02/04/23 0413   • prochlorperazine (COMPAZINE) injection 2.5 mg  2.5 mg Intravenous Q6H PRN Rodriguez Alan MD   2.5 mg at 02/04/23 0424   • sodium chloride 0.9 % flush 10 mL  10 mL Intravenous PRN Rodriguez Alan MD       • sodium chloride 0.9 % flush 10 mL  10 mL Intravenous Q12H Rodriguez Alan MD       • sodium chloride 0.9 % flush 10 mL  10 mL Intravenous PRN Rodriguez Alan MD       • sodium chloride 0.9 % infusion 40 mL  40 mL Intravenous PRN Rodriguez Alan MD       • sodium chloride 0.9 % infusion  125 mL/hr Intravenous Continuous Rodriguez Alan  mL/hr at 02/04/23 0723 125 mL/hr at 02/04/23 0723       NIH stroke scale:        1a Level of consciousness 0  1b LOC questions 0  1c LOC commands 0  2 Best Gaze 0  3 Visual 0  4 Facial Palsy 0  5a Motor left arm 0  5b Motor right arm 0  6a Motor left leg 0  6b Motor right leg 0  7 Limb ataxia 0  8 Sensory 0  9 Best language 0  10 Dysarthria 0  11 Extinction and inattention 0   Total 0    Results:  Lab Results   Component Value Date    CREATININE 0.46 (L) 02/04/2023     Lab Results   Component Value Date    WBC 5.63 02/04/2023     No components found for: INR;3,  PROTIME   Lab Results   Component Value Date     (H)  02/04/2023      Lab Results   Component Value Date    HGBA1C 4.60 (L) 02/03/2023      CT Abdomen Pelvis Without Contrast    Result Date: 2/4/2023  Narrative: CT Abdomen Pelvis WO INDICATION: Abnormal elevated liver enzymes. Tachycardia. TECHNIQUE: CT of the abdomen and pelvis without IV contrast. Coronal and sagittal reconstructions were obtained.  Radiation dose reduction techniques included automated exposure control or exposure modulation based on body size. Count of known CT and cardiac nuc med studies performed in previous 12 months: 5. COMPARISON: 1/25/2023 FINDINGS: There is chronic right base atelectasis due to a chronically elevated right hemidiaphragm. There is a hiatal hernia with thickening of the lower esophagus with adjacent fat stranding compatible with esophagitis. There is coronary artery disease. There is atherosclerotic disease with no aortic aneurysm. Gallbladder is surgically absent. Patient is status post gastric bypass. Gas-filled small bowel loops suggesting ileus. No definite small bowel obstruction is seen. There are a few scattered colonic diverticula, but there is no evidence of acute diverticulitis or acute colitis. The appendix is normal. High density material in the urinary bladder is likely excreted MRI contrast from prior studies this evening. Uterus is surgically absent. There is diffuse hepatic steatosis. No liver mass is identified allowing for the lack of IV contrast. Left adrenal nodule is unchanged in the short interval. Solid abdominal organs are otherwise normal. There is no adenopathy or free fluid. Patient is status post L5-S1 spinal fusion with laminectomy. There is an old fracture at L2.     Impression: 1. Thickening of the lower esophagus with adjacent fat stranding concerning for esophagitis. 2. Evidence of small bowel ileus. No bowel obstruction. Normal appendix. 3. Hepatic steatosis without focal liver mass. 4. Cholecystectomy, hysterectomy, and gastric bypass. Signer  Name: Bi Herrera MD  Signed: 2/4/2023 1:31 AM  Workstation Name: [email or web address]  [facility]    CT Head Without Contrast    Result Date: 1/25/2023  Narrative: CT Head WO HISTORY: Weakness TECHNIQUE: Routine noncontrast head CT. Radiation dose reduction techniques included automated exposure control or exposure modulation based on body size. Radiation audit for CT and nuclear cardiology exams in the last 12 months: 2. COMPARISON: None. FINDINGS: No acute intracranial hemorrhage, mass lesion, or abnormal extra-axial fluid collection. No midline shift or focal mass effect. Ventricular system is normal in size and configuration. . The gray-white matter differentiation is preserved. Visualized paranasal sinuses are clear. Visualized mastoid air cells are clear. No acute osseous abnormality.     Impression: 1.  No acute intracranial abnormality. Signer Name: VERA FLOYD MD  Signed: 1/25/2023 8:28 PM  Workstation [facility]: [facility]  [facility]    CT Head Without Contrast    Result Date: 1/17/2023  Narrative: EXAM:   CT Head Without Intravenous Contrast  EXAM DATE:   1/17/2023 1:24 PM  CLINICAL HISTORY:   Syncope/presyncope, cerebrovascular cause suspected  TECHNIQUE:   Axial computed tomography images of the head/brain without intravenous contrast.  Sagittal and coronal reformatted images were created and reviewed.  This CT exam was performed using one or more of the following dose reduction techniques:  automated exposure control, adjustment of the mA and/or kV according to patient size, and/or use of iterative reconstruction technique.  COMPARISON:   No relevant prior studies available.  FINDINGS:   BRAIN:  Unremarkable.  No hemorrhage.  No significant white matter disease.  No edema.   VENTRICLES:  Unremarkable.  No ventriculomegaly.   BONES/JOINTS:  Unremarkable.  No acute fracture.   SOFT TISSUES:  Unremarkable.   SINUSES:  Unremarkable as visualized.  No acute  sinusitis.   MASTOID AIR CELLS:  Unremarkable as visualized.  No mastoid effusion.      Impression:   Unremarkable exam demonstrating no CT evidence of acute intracranial findings.  This report was finalized on 1/17/2023 2:13 PM by Dr. Daniel Baptiste MD.      MRI Angiogram Head Without Contrast    Result Date: 2/3/2023  Narrative: MRA Head WO INDICATION:  Right-sided weakness, CT abnormality in the chrissy TECHNIQUE: Axial time-of-flight MRA of the head with 3-dimensional reformats. COMPARISON:  CT brain 2/3/2023. FINDINGS: Normal flow related signal within the intracranial carotid arteries, middle cerebral arteries, anterior cerebral arteries, intradural vertebral arteries, basilar artery, or posterior cerebral artery without significant stenosis or occlusion. No evidence of cerebral aneurysm. CT abnormality is not well visualized on MRA.     Impression: 1.  No flow-limiting stenosis or occlusion. No cerebral aneurysm. 2.  CT abnormality is not well evaluated on MRA. Signer Name: Feng Levi MD  Signed: 2/3/2023 11:26 PM  Workstation Name: [email or web address]  [facility]    MRI Angiogram Neck Without Contrast    Result Date: 2/4/2023  Narrative: MRA Neck WO INDICATION:  Right-sided weakness TECHNIQUE: Axial time-of-flight MRA of the neck with 3-dimensional reformats.  Evaluation for a significant carotid arterial stenosis is based on the NASCET criteria. COMPARISON:  None available. FINDINGS: Normal flow-related signal within the cervical carotid arteries and vertebral arteries without flow-limiting stenosis or occlusion.     Impression: No flow-limiting stenosis or occlusion. Signer Name: Feng Levi MD  Signed: 2/4/2023 12:15 AM  Workstation Name: [email or web address]  [facility]    MRI Brain With & Without Contrast    Result Date: 2/4/2023  Narrative: MRI Brain WO W CLINICAL HISTORY: Ct abdnormality in left chrissy, right side weakness;Tachycardia, unspecified;Unspecified symptoms and signs  involving the nervous system. COMPARISON: CT brain same day. TECHNIQUE: Multiplanar, multisequence images of the brain were obtained with and without intravenous contrast. FINDINGS: No acute infarct, intracranial hemorrhage, mass effect, hydrocephalus, or midline shift. 0.7 x 0.5 cm T2/FLAIR hyperintense lesion along the anterior surface of the left anterior chrissy with associated susceptibility artifact. No appreciable enhancement. Scattered foci of FLAIR hyperintensity in the cerebral white matter, nonspecific but likely representing chronic microvascular white matter changes.  Ventricles and cortical sulci are mildly prominent, in keeping with age-related volume loss.  Basal cisterns are clear. Flow voids are preserved in major intracranial vessels. Paranasal sinuses and mastoid air cells are clear.     Impression: 1.  No acute infarct, hemorrhage, mass effect, or hydrocephalus. 2.  0.7 x 0.5 cm indeterminate T2/FLAIR hyperintense lesion along the anterior chrisys with associated susceptibility artifact and no appreciable enhancement corresponding to abnormality seen on CT. Recommend follow-up study in 3-6 months to document stability or change. Subsequent study can be done with MR IAC protocol with and without contrast with thin slices through the brainstem. Signer Name: Feng Levi MD  Signed: 2/4/2023 12:14 AM  Workstation Name: [email or web address]  [facility] [place]    CT Abdomen Pelvis With Contrast    Result Date: 1/25/2023  Narrative: CT ABDOMEN AND PELVIS WITH CONTRAST, 1/25/2023 HISTORY: 62-year-old female in the ED with urinary tract infection and tachycardia. Clinical concern for pyelonephritis. TECHNIQUE: CT imaging of the abdomen and pelvis with IV contrast. Radiation dose reduction techniques included automated exposure control. Radiation audit for CT and nuclear cardiology exams in the last 12 months: 3. COMPARISON: *  CT abdomen/pelvis, 11/13/2022. ABDOMEN FINDINGS: Both kidneys, both ureters  and the urinary bladder are normal in appearance. No evidence of urinary obstruction or renal inflammation. Cholecystectomy. No bile duct dilatation. Mild hepatomegaly and moderate diffuse hepatic steatosis. Pancreas and spleen are within normal limits. Previous gastric bypass surgery with Tin-en-Y gastrojejunostomy. No gastric distention. Mild distal esophageal dilatation. Small bowel and colon are otherwise unremarkable. Normal appendix. Normal caliber abdominal aorta. Small indeterminate circumscribed left adrenal nodule is unchanged. Correlate for any results of previously recommended imaging assessment. PELVIS FINDINGS: Hysterectomy. Urinary bladder and rectum are within normal limits. No free pelvic fluid. No inguinal hernia. Lung base images show no active disease. Elevated right hemidiaphragm with bandlike right lung base scarring. Spine images show previous L5-S1 disc fusion surgery and mild chronic compression fracture deformity at L2.     Impression: 1.  No acute abnormality within the abdomen or pelvis. 2.  No CT evidence of pyelonephritis or upper urinary tract obstruction. 3.  Cholecystectomy. Mild hepatomegaly. Moderate diffuse hepatic steatosis. 4.  Gastric bypass surgery. Mild distal esophageal dilatation. 5.  Normal appendix. 6.  Hysterectomy. 7.  Small indeterminate left adrenal nodule, unchanged. Signer Name: Israel Goff MD  Signed: 1/25/2023 10:06 PM  Workstation Name: [facility]-  [facility]    XR Chest 1 View    Result Date: 2/3/2023  Narrative: EXAM:   XR Chest, 1 View  EXAM DATE:   2/3/2023 2:55 PM  CLINICAL HISTORY:   sob  TECHNIQUE:   Frontal view of the chest.  COMPARISON:   01/25/2023  FINDINGS:   Lungs:  Unremarkable.  No consolidation.   Pleural space:  Unremarkable.  No pneumothorax.   Heart:  Unremarkable.  No cardiomegaly.   Mediastinum:  Unremarkable.   Bones/joints:  Stable appearance of the right humeral neck likely from old fracture.   Soft  tissues:  Surgical clips GE junction region are noted.      Impression:   Stable chest. No acute cardiopulmonary findings.  This report was finalized on 2/3/2023 3:25 PM by Dr. Marco Govea MD.      XR Chest 1 View    Result Date: 1/25/2023  Narrative: CR Chest 1 Vw INDICATION: Weakness COMPARISON:  1/17/2023 FINDINGS: Portable AP view(s) of the chest. Mild elevation of the right hemidiaphragm. The heart and mediastinal contours are normal. The lungs are free of acute infiltrates. No pneumothorax or pleural effusion.     Impression: No acute cardiopulmonary findings. No significant interval change from prior study. Signer Name: Nicanor Woods MD  Signed: 1/25/2023 7:32 PM  Workstation Name: [id number]-  [facility]    XR Chest 1 View    Result Date: 1/17/2023  Narrative: EXAM:   XR Chest, 1 View  EXAM DATE:   1/17/2023 2:13 PM  CLINICAL HISTORY:   CHF/COPD Protocol  TECHNIQUE:   Frontal view of the chest.  COMPARISON:   No relevant prior studies available.  FINDINGS:   LUNGS:  Unremarkable.  No consolidation.   PLEURAL SPACE:  Unremarkable.  No pneumothorax.   HEART:  Unremarkable.  No cardiomegaly.   MEDIASTINUM:  Unremarkable.   BONES/JOINTS:  Unremarkable.      Impression:   Unremarkable exam. No acute cardiopulmonary findings identified.  This report was finalized on 1/17/2023 2:41 PM by Dr. Daniel Baptiste MD.      CT Head Without Contrast Stroke Protocol    Result Date: 2/3/2023  Narrative: HISTORY: Neurologic deficit. Acute stroke protocol. Right-sided weakness. Time of scan 6:30 PM read time 6:35 PM TECHNIQUE: CT head without contrast. Radiation dose reduction techniques included automated exposure control or exposure modulation based on body size. Radiation audit for number of CT and nuclear cardiology exams performed in the last year 2 COMPARISON: Head CT from 1/25/2023. FINDINGS: There is no displaced calvarial fracture. The visualized paranasal sinuses and mastoid air cells are  clear. There is no evidence for acute intracranial hemorrhage or extra-axial fluid collection. The ventricles are normal in size and configuration for age group. There is no Chiari I malformation. No acute cortical infarct is suspected. Gray-white junction is relatively well-maintained for age group. There is a focus of tissue attenuation anterior to the left side of the chrissy that is about 6 x 6 x 5 mm dimension. Its not changed from the prior study. It could be exophytic from the chrissy and should be further evaluated with an MRI with and without contrast with attention to the posterior fossa.     Impression: No acute intracranial abnormality is suspected, but if there is clinical concern for acute CVA, follow-up imaging is recommended. 2. There is a soft tissue lesion associated with the left anterior chrissy measuring about 6 x 6 x 5 mm dimension. It could be a parenchymal lesion exophytic from the chrissy or lesion adjacent to the chrissy. It should be further evaluated with an MRI with and without contrast with attention to the brainstem if the patient is a candidate for MRI. This is not changed from the 1/25/2023 exam. Signer Name: Ingris Harrison MD  Signed: 2/3/2023 6:40 PM  Workstation Name: [email or web address]  [facility] [place]        Teleneurology Patient Verification & Consent    I have proceeded with this evaluation at the direct request of the referring practitioner as there is felt to be no appropriate specialist available at bedside to perform these evaluations. The [facility] ([facility]) practitioner has reported to me this is the correct patient and has obtained verbal consent from the patient/surrogate to perform his voluntary telemedicine evaluation (including obtaining history, performing examination and reviewing data provided by the patient and/or originating [facility] care provider). I attest that I introduced myself to the patient, provided my credentials, disclosed my  location, and determined that, based on review of the patient's chart and discussion with the patient's primary team, telemedicine via a HIPAA-compliant, real-time, face-to-face, two-way, interactive audio and video platform is an appropriate and effective means of providing this service.  The patient/surrogate has the right to refuse this evaluation as they have been explained risks (including potential loss of confidentiality), benefits, alternatives, and the potential need for subsequent face to face care. Patient/surrogate understands that there is a risk of medical inaccuracies given that our recommendations will be made based on reported data (and we must therefore assume this information is accurate). Knowing that there is a risk that this information is not reported accurately, and that the telemedicine video, audio, or data feed may be incomplete, the patient agrees to proceed with evaluation and holds us harmless knowing these risks. In this evaluation, we will be providing recommendations only. The ultimate decision to follow, or not follow, these recommendations will be left to the bedside treating/requesting practitioner. The patient/surrogate has been notified that other healthcare professionals (including technical personnel) may be involved in this audio-video evaluation. All laws concerning confidentiality and patient access to medical records and copies of medical records apply to telemedicine. The patient/surrogate has received the originating site's Health Notice of Privacy Practices.    Medical Data Reviewed:   1. Data reviewed include clinical labs, radiology, medical test;  2. Obtaining/reviewing old medical records;  3. Obtaining case history from another source;  4. Independent review of CNS images    Christopher ZHENG MD, saw patient on 2/4/2023 for a followup in-patient or emergency room telemedicine face-to-face consult using interactive technology. The location of the patient was at  [place]. My location was [place]. I was assisted with the exam by Nurse  Doris

## 2023-02-04 NOTE — H&P
Hospitalist History and Physical        Patient Identification  Name: Ana Laura Jones  Age/Sex: 62 y.o. female  :  1960        MRN: [id number]  Visit Number: [id number]  Admit Date: 2/3/2023   PCP: Annita Adames APRN          Chief complaint heart racing    History of Present Illness:  Patient is a 62 y.o. female with history of gastric bypass surgery and cholecystectomy who presents with complaints of tachycardia. She reports intermittent tachycardia for some time now. Over the last 3 weeks she has suffered 5 syncopal episodes, for which she presented to our ED on 23. At that time, vertigo was in the differential per ED note. UA was concerning for UTI though she had no urinary symptoms. UDS was positive for THC and patient admitted to taking THC-containing gummies. She was advised to stop taking these and sent home with a prescription for macrobid. She returned to the ED on 23 with complaints of dizziness and recurrent syncope and was found to be tachycardic in the 140s. She continued to deny urinary symptoms. UA showed persistently positive nitrite, leuk esterase and 4+ bacteria though WBC had improved from 21-30 to 3-5. Review of urine culture from 23 showed she grew E coli which was pan-sensitive but she was placed on levaquin with thoughts that the macrobid may not be strong enough and thus could be why her tachycardia and syncope were persisting. She has completed her levaquin course and still denies any urinary symptoms, yet dizziness and tachycardia have persisted. She also has experienced more nausea and some periumbilical abdominal pain. She has not experienced any further syncopal episodes but continues to get lightheaded with standing and heart races more with standing as well. She was referred to cardiology from one of her ED visits and saw the cardiologist in the clinic for the first time on 2/3. At time of this visit, her heart rate was in the 140s again and the  cardiologist referred her back to the ED for further evaluation. While in the ED waiting area, patient developed acute onset right sided weakness and neurology was consulted. Patient denies chest pain, dyspnea, or lower extremity edema. She denies any illicit drug use. She underwent CT head that showed no acute intracranial abnormality but mentioned a soft tissue lesion associated with the left anterior chrissy for which MRI was recommended. MRI brain without contrast showed an indeterminate T2/FLAIR hyperintense lesion along with anterior chrissy with associated susceptibility artifact and no appreciable enhancement, for which radiology recommended follow up study in 3-6 months to document stability or change. Patient's right sided weakness has since resolved and heart rate is currently improved to the 110s. Her SBP has been elevated since arrival, ranging form 150s to 190s. Labs revealed mild alkalosis on ABG, bicarb 18 on CMP, with elevated anion gap 16, elevated alk phos 130, ALT 82, and  (all or most have been mildly elevated since 11/13/22 but are slightly higher today). WBC, CRP and procalcitonin are normal though lactate was elevated at 2.8 and has subsequently trended down to 2.3. Troponin is negative x2, BNP is negative, and EKG showed sinus tachycardia but no overt ST changes to suggest acute ischemia. Patient is being admitted to the telemetry unit for further workup and management.     Review of Systems  Review of Systems   Constitutional: Positive for activity change. Negative for appetite change, chills, diaphoresis, fever and unexpected weight change.   HENT: Negative for congestion, postnasal drip, rhinorrhea, sinus pressure, sinus pain and sore throat.    Eyes: Negative for photophobia, pain, discharge, redness, itching and visual disturbance.   Respiratory: Negative for cough, shortness of breath and wheezing.    Cardiovascular: Positive for palpitations. Negative for chest pain and leg swelling.    Gastrointestinal: Positive for abdominal pain and nausea. Negative for abdominal distention, constipation, diarrhea and vomiting.   Endocrine: Negative for cold intolerance, heat intolerance, polydipsia, polyphagia and polyuria.   Genitourinary: Negative for difficulty urinating, dysuria, flank pain, frequency and hematuria.   Musculoskeletal: Negative for arthralgias, back pain, joint swelling, myalgias, neck pain and neck stiffness.   Skin: Positive for color change (bruises on feet from recent syncopal events/falls). Negative for pallor, rash and wound.   Neurological: Positive for dizziness, syncope, weakness (generalized currently, with right sided weakness earlier in the ED) and light-headedness. Negative for tremors, seizures and numbness.   Hematological: Negative for adenopathy. Does not bruise/bleed easily.   Psychiatric/Behavioral: Negative for agitation, behavioral problems and confusion.       History  Past Medical History:   Diagnosis Date   • Shoulder fracture      Past Surgical History:   Procedure Laterality Date   • BLADDER REPAIR     • CHOLECYSTECTOMY     • GASTRIC BYPASS     • HYSTERECTOMY     • LASIK     • LUMBAR DISC SURGERY      L5 fusion   • ORIF HUMERUS FRACTURE Right 8/30/2020    Procedure: RIGHT SHOULDER HUMERUS PROXIMAL OPEN REDUCTION INTERNAL FIXATION WITH PLATE AND SCREW;  Surgeon: Eleazar Núñez MD;  Location: [facility];  Service: Orthopedics;  Laterality: Right;   • TOTAL SHOULDER REPLACEMENT      right      Family History   Problem Relation Age of Onset   • Stomach cancer Mother    • No Known Problems Father      Social History     Tobacco Use   • Smoking status: Never   • Smokeless tobacco: Never   Substance Use Topics   • Alcohol use: No   • Drug use: No     (Not in a hospital admission)    Allergies:  Vancomycin    Objective     Vital Signs  Temp:  [97.3 °F (36.3 °C)-98 °F (36.7 °C)] 97.3 °F (36.3 °C)  Heart Rate:  [113-145] 118  Resp:  [18] 18  BP: (164-197)/()  192/99  Body mass index is 28.29 kg/m².    Physical Exam:  Physical Exam  Constitutional:       General: She is not in acute distress.     Appearance: She is obese. She is not ill-appearing.   HENT:      Head: Normocephalic and atraumatic.      Right Ear: External ear normal.      Left Ear: External ear normal.      Nose: Nose normal.      Mouth/Throat:      Mouth: Mucous membranes are moist.      Pharynx: Oropharynx is clear.   Eyes:      Extraocular Movements: Extraocular movements intact.      Conjunctiva/sclera: Conjunctivae normal.      Pupils: Pupils are equal, round, and reactive to light.   Cardiovascular:      Rate and Rhythm: Regular rhythm. Tachycardia present.      Pulses: Normal pulses.      Heart sounds: Normal heart sounds. No murmur heard.  Pulmonary:      Effort: Pulmonary effort is normal. No respiratory distress.      Breath sounds: Normal breath sounds. No wheezing or rales.   Abdominal:      General: Abdomen is flat. Bowel sounds are normal. There is no distension.      Palpations: Abdomen is soft.      Tenderness: There is abdominal tenderness (minimal, around periumbilical region).   Musculoskeletal:         General: Normal range of motion.      Cervical back: Normal range of motion and neck supple. No tenderness.      Right lower leg: No edema.      Left lower leg: No edema.   Lymphadenopathy:      Cervical: No cervical adenopathy.   Skin:     General: Skin is warm and dry.      Capillary Refill: Capillary refill takes less than 2 seconds.      Comments: Mild bruising around ankles   Neurological:      General: No focal deficit present.      Mental Status: She is alert and oriented to person, place, and time.   Psychiatric:         Mood and Affect: Mood normal.         Behavior: Behavior normal.         Thought Content: Thought content normal.         Judgment: Judgment normal.           Results Review:       Lab Results:  Results from last 7 days   Lab Units 02/03/23  1425   WBC 10*3/mm3  6.56   HEMOGLOBIN g/dL 10.9*   PLATELETS 10*3/mm3 445     Results from last 7 days   Lab Units 02/03/23  1425   CRP mg/dL <0.30     Results from last 7 days   Lab Units 02/03/23  1425   SODIUM mmol/L 141   POTASSIUM mmol/L 3.7   CHLORIDE mmol/L 106   CO2 mmol/L 18.5*   BUN mg/dL 6*   CREATININE mg/dL 0.52*   CALCIUM mg/dL 8.4*   GLUCOSE mg/dL 110*     Results from last 7 days   Lab Units 02/03/23  1425   MAGNESIUM mg/dL 1.9     Hemoglobin A1C   Date Value Ref Range Status   02/03/2023 4.60 (L) 4.80 - 5.60 % Final     Results from last 7 days   Lab Units 02/03/23  1425   BILIRUBIN mg/dL 0.4   ALK PHOS U/L 130*   AST (SGOT) U/L 120*   ALT (SGPT) U/L 82*     Results from last 7 days   Lab Units 02/03/23  1942 02/03/23  1425   TROPONIN T ng/mL <0.010 <0.010         Results from last 7 days   Lab Units 02/03/23  1425   INR  0.93     Results from last 7 days   Lab Units 02/03/23  1414   PH, ARTERIAL pH units 7.473*   PO2 ART mm Hg 86.6   PCO2, ARTERIAL mm Hg 28.2*   HCO3 ART mmol/L 20.7       I have reviewed the patient's laboratory results.    Imaging:  Imaging Results (Last 72 Hours)     Procedure Component Value Units Date/Time    CT Abdomen Pelvis Without Contrast [[id number]] Collected: 02/04/23 0131     Updated: 02/04/23 0133    Narrative:      CT Abdomen Pelvis WO    INDICATION:   Abnormal elevated liver enzymes. Tachycardia.    TECHNIQUE:   CT of the abdomen and pelvis without IV contrast. Coronal and sagittal reconstructions were obtained.  Radiation dose reduction techniques included automated exposure control or exposure modulation based on body size. Count of known CT and cardiac nuc  med studies performed in previous 12 months: 5.     COMPARISON:   1/25/2023    FINDINGS:  There is chronic right base atelectasis due to a chronically elevated right hemidiaphragm. There is a hiatal hernia with thickening of the lower esophagus with adjacent fat stranding compatible with esophagitis. There is coronary artery  disease. There is  atherosclerotic disease with no aortic aneurysm. Gallbladder is surgically absent. Patient is status post gastric bypass. Gas-filled small bowel loops suggesting ileus. No definite small bowel obstruction is seen. There are a few scattered colonic  diverticula, but there is no evidence of acute diverticulitis or acute colitis. The appendix is normal.    High density material in the urinary bladder is likely excreted MRI contrast from prior studies this evening. Uterus is surgically absent. There is diffuse hepatic steatosis. No liver mass is identified allowing for the lack of IV contrast. Left adrenal  nodule is unchanged in the short interval. Solid abdominal organs are otherwise normal. There is no adenopathy or free fluid. Patient is status post L5-S1 spinal fusion with laminectomy. There is an old fracture at L2.      Impression:        1. Thickening of the lower esophagus with adjacent fat stranding concerning for esophagitis.  2. Evidence of small bowel ileus. No bowel obstruction. Normal appendix.  3. Hepatic steatosis without focal liver mass.  4. Cholecystectomy, hysterectomy, and gastric bypass.      Signer Name: Bi Herrera MD   Signed: 2/4/2023 1:31 AM   Workstation Name: [id number]    [facility]    MRI Angiogram Neck Without Contrast [[id number]] Collected: 02/04/23 0015     Updated: 02/04/23 0017    Narrative:      MRA Neck WO    INDICATION:    Right-sided weakness    TECHNIQUE:   Axial time-of-flight MRA of the neck with 3-dimensional reformats.  Evaluation for a significant carotid arterial stenosis is based on the NASCET criteria.    COMPARISON:    None available.    FINDINGS:  Normal flow-related signal within the cervical carotid arteries and vertebral arteries without flow-limiting stenosis or occlusion.      Impression:      No flow-limiting stenosis or occlusion.    Signer Name: Feng Levi MD   Signed: 2/4/2023 12:15 AM   Workstation Name:  [email or web address]    [facility]    MRI Brain With & Without Contrast [[id number]] Collected: 02/04/23 0014     Updated: 02/04/23 0016    Narrative:      MRI Brain WO W    CLINICAL HISTORY: Ct abdnormality in left chrissy, right side weakness;Tachycardia, unspecified;Unspecified symptoms and signs involving the nervous system.    COMPARISON: CT brain same day.    TECHNIQUE: Multiplanar, multisequence images of the brain were obtained with and without intravenous contrast.    FINDINGS:    No acute infarct, intracranial hemorrhage, mass effect, hydrocephalus, or midline shift.     0.7 x 0.5 cm T2/FLAIR hyperintense lesion along the anterior surface of the left anterior chrissy with associated susceptibility artifact. No appreciable enhancement.    Scattered foci of FLAIR hyperintensity in the cerebral white matter, nonspecific but likely representing chronic microvascular white matter changes.      Ventricles and cortical sulci are mildly prominent, in keeping with age-related volume loss.  Basal cisterns are clear. Flow voids are preserved in major intracranial vessels.     Paranasal sinuses and mastoid air cells are clear.      Impression:        1.  No acute infarct, hemorrhage, mass effect, or hydrocephalus.  2.  0.7 x 0.5 cm indeterminate T2/FLAIR hyperintense lesion along the anterior chrissy with associated susceptibility artifact and no appreciable enhancement corresponding to abnormality seen on CT. Recommend follow-up study in 3-6 months to document  stability or change. Subsequent study can be done with MR IAC protocol with and without contrast with thin slices through the brainstem.    Signer Name: Feng Levi MD   Signed: 2/4/2023 12:14 AM   Workstation Name: [id number]    [facility]    MRI Angiogram Head Without Contrast [[id number]] Collected: 02/03/23 2326     Updated: 02/03/23 2328    Narrative:      MRA Head WO    INDICATION:    Right-sided weakness, CT abnormality in the  chrissy    TECHNIQUE:   Axial time-of-flight MRA of the head with 3-dimensional reformats.    COMPARISON:    CT brain 2/3/2023.    FINDINGS:    Normal flow related signal within the intracranial carotid arteries, middle cerebral arteries, anterior cerebral arteries, intradural vertebral arteries, basilar artery, or posterior cerebral artery without significant stenosis or occlusion. No evidence  of cerebral aneurysm.    CT abnormality is not well visualized on MRA.      Impression:      1.  No flow-limiting stenosis or occlusion. No cerebral aneurysm.  2.  CT abnormality is not well evaluated on MRA.    Signer Name: Feng Levi MD   Signed: 2/3/2023 11:26 PM   Workstation Name: [id number]    [facility]    CT Head Without Contrast Stroke Protocol [[id number]] Collected: 02/03/23 1840     Updated: 02/03/23 1842    Narrative:      HISTORY:   Neurologic deficit. Acute stroke protocol. Right-sided weakness. Time of scan 6:30 PM read time 6:35 PM    TECHNIQUE:   CT head without contrast. Radiation dose reduction techniques included automated exposure control or exposure modulation based on body size. Radiation audit for number of CT and nuclear cardiology exams performed in the last year 2    COMPARISON:   Head CT from 1/25/2023.    FINDINGS:   There is no displaced calvarial fracture. The visualized paranasal sinuses and mastoid air cells are clear. There is no evidence for acute intracranial hemorrhage or extra-axial fluid collection. The ventricles are normal in size and configuration for  age group. There is no Chiari I malformation. No acute cortical infarct is suspected. Gray-white junction is relatively well-maintained for age group. There is a focus of tissue attenuation anterior to the left side of the chrissy that is about 6 x 6 x 5 mm  dimension. Its not changed from the prior study. It could be exophytic from the chrissy and should be further evaluated with an MRI with and without contrast with  attention to the posterior fossa.      Impression:      No acute intracranial abnormality is suspected, but if there is clinical concern for acute CVA, follow-up imaging is recommended.  2. There is a soft tissue lesion associated with the left anterior chrissy measuring about 6 x 6 x 5 mm dimension. It could be a parenchymal lesion exophytic from the chrissy or lesion adjacent to the chrissy. It should be further evaluated with an MRI with and  without contrast with attention to the brainstem if the patient is a candidate for MRI. This is not changed from the 1/25/2023 exam.    Signer Name: Ingris Harrison MD   Signed: 2/3/2023 6:40 PM   Workstation Name: [email or web address]    [facility]    XR Chest 1 View [[id number]] Collected: 02/03/23 1525     Updated: 02/03/23 1535    Narrative:      EXAM:    XR Chest, 1 View     EXAM DATE:    2/3/2023 2:55 PM     CLINICAL HISTORY:    sob     TECHNIQUE:    Frontal view of the chest.     COMPARISON:    01/25/2023     FINDINGS:    Lungs:  Unremarkable.  No consolidation.    Pleural space:  Unremarkable.  No pneumothorax.    Heart:  Unremarkable.  No cardiomegaly.    Mediastinum:  Unremarkable.    Bones/joints:  Stable appearance of the right humeral neck likely from  old fracture.    Soft tissues:  Surgical clips GE junction region are noted.       Impression:        Stable chest. No acute cardiopulmonary findings.     This report was finalized on 2/3/2023 3:25 PM by Dr. Marco Govea MD.             I have personally reviewed the patient's radiologic imaging.        EKG:   Sinus tachycardia, , QTc 444  Otherwise normal ECG  When compared with ECG of 25-JAN-2023 18:22,  fusion complexes are no longer present  premature ventricular complexes are no longer present  Confirmed by Waqar Fletcher ([id number]) on 2/3/2023 4:09:17 PM    I have personally reviewed the patient's EKG.        Assessment & Plan     - Tachycardia, lightheadedness, frequent syncopal episodes with standing  (5 in the last 3 weeks): admit to telemetry. Troponin negative x2. Continue to monitor on telemetry. HR improved with IV fluids, will continue maintenance fluids at this time. Check orthostatics qshift. Repeat UA to ensure clearance of UTI as recently diagnosed with UTI on subsequent ED visits and treated with macrobid followed by levaquin. Obtain UDS to look for potential contributing substance as well (prior was positive for THC). Obtain ECHO in the morning. CT head and MRI brain with indeterminate left chrissy lesion which radiology is recommending f/u MRI in 3-6 months. Consult neurology for further guidance given these findings coupled with transient right sided weakness witnessed in the ED.    - Transient right sided weakness: etiology unclear. Evaluated by neurology in the ED. This was before MRI findings were resulted. Consult neuro to re-evaluate in the morning. Monitor BP as uncontrolled hypertension could be contributing to symptoms. Low dose metoprolol added.   - Lactic acidosis: etiology unclear. No infectious source identified at this time though UA still pending as noted above. Normal WBC, CRP and procal levels would argue against underlying infection, however. Possibly related to sustained severe tachycardia? Improving with IV fluids as is heart rate. Repeat level later this morning.    - Mild transaminitis: CT abdomen/pelvis showed fatty liver which is likely contributing as LFTs have been elevated prior to today. Recent levaquin use may have raised levels further. Acute hepatitis panel negative. Continue to monitor.   - Esophagitis, small bowel ileus seen on CT abdomen/pelvis: initiate PO protonix daily starting now. Patient reports she has been eating and drinking well in recent days. Abdomen is soft on exam with normal bowel sounds. Monitor for signs/symptoms of worsening ileus.    DVT/GI Prophylaxis: SQ heparin; PO protonix    Estimated Length of Stay <2 midnights (observation)    I discussed the  patient's findings, assessment and plan with the patient, her sister at bedside, and ED provider Dr Haro.    * Patient is high risk due to tachycardia, recurrent syncope and presyncopal symptoms, transient right sided weakness, lactic acidosis    Rodriguez Alan MD  02/04/23  01:54 EST

## 2023-02-04 NOTE — PLAN OF CARE
Goal Outcome Evaluation:  Plan of Care Reviewed With: patient     Pt is resting in bed with eyes closed. Chest is rising and falling evenly. No s/s of acute distress noted. Pt had a 15 beat run of PSVT. Made Dr. Campuzano's aware. See orders. Pt had complaints of nausea and BP systolic was 190. Made Dr. Alan. See orders. BP has decreased (see flowsheet). No complaints at this time. Completed NIH stroke scale. Pt scored 0.

## 2023-02-04 NOTE — CONSULTS
Date of Admit: 2/3/2023  Date of Consult: 02/04/23  Provider, No Known        Syncope      Assessment      1. Multiple syncopal episodes with reported SVT  2. Palpitations      Recommendations     #1 cardiac.  Patient with recent episode of recurrent UTIs requiring antibiotic therapy.  Patient has been getting dizzy lightheaded and palpitations.  He also had an episode of frequent syncopes.  Echocardiogram done on February 3 showed normal LV ejection fraction, mild pulm hypertension.  No structural/valvular pathologies noted .  Orthostatics can be advised.   Patient to continue to be followed on monitor to rule out any arrhythmias as a cause of her syncope.  May need prolonged 30-day event monitor placed on discharge.  Thank you for the consultation and will follow along      Reason for consultation:    Subjective       Subjective     Ana Laura Jones is a 62 y.o. female with PMH significant for:   · Gastric bypass surgery  · Cholecystectomy  · THC use  · Esophagitis    She presented to the emergency department of [facility] on February 3, 2023 for further evaluation of lightheadedness with reports of frequent syncopal episodes when standing 5 reported and the last 3 weeks at the time of admission.  She did undergo UA to ensure clearance of a recently diagnosed UTI and an ED visit as well as urine drug screen with CT and MRI brain performed in the emergency department with intermittent indeterminate left chrissy lesion with radiology recommending a follow-up MRI in 3 to 6 months.  There was also some concern for transient right-sided weakness.  On February 4, 2023 consultation was also placed with cardiology for SVT with 5 recent syncopal episodes.  She is chest pain-free at the time of evaluation but does report she has been having periodic episodes of palpitations accompanied by diaphoresis.      Cardiac risk factors:Sedentary life style and Obesity    Last Echo:  Results for orders placed during the  hospital encounter of 02/03/23    Adult Transthoracic Echo Complete w/ Color, Spectral and Contrast if necessary per protocol    Interpretation Summary  •  Left ventricular systolic function is normal. Left ventricular ejection fraction appears to be 61 - 65%.  •  Left ventricular diastolic function is consistent with (grade I) impaired relaxation.  •  Estimated right ventricular systolic pressure from tricuspid regurgitation is moderately elevated (45-55 mmHg).  •  Moderate pulmonary hypertension is present.      Last Stress:     Last Cath:      Past Medical History:   Diagnosis Date   • Shoulder fracture      Past Surgical History:   Procedure Laterality Date   • BLADDER REPAIR     • CHOLECYSTECTOMY     • GASTRIC BYPASS     • HYSTERECTOMY     • LASIK     • LUMBAR DISC SURGERY      L5 fusion   • ORIF HUMERUS FRACTURE Right 8/30/2020    Procedure: RIGHT SHOULDER HUMERUS PROXIMAL OPEN REDUCTION INTERNAL FIXATION WITH PLATE AND SCREW;  Surgeon: Eleazar Núñez MD;  [facility];  Service: Orthopedics;  Laterality: Right;   • TOTAL SHOULDER REPLACEMENT      right      Family History   Problem Relation Age of Onset   • Stomach cancer Mother    • No Known Problems Father      Social History     Tobacco Use   • Smoking status: Never   • Smokeless tobacco: Never   Vaping Use   • Vaping Use: Never used   • Passive vaping exposure: Yes   Substance Use Topics   • Alcohol use: No   • Drug use: No     Medications Prior to Admission   Medication Sig Dispense Refill Last Dose   • melatonin 5 MG tablet tablet Take 10 mg by mouth At Night As Needed (sleep).   2/2/2023     Allergies:  Vancomycin    Review of Systems   Constitutional: Negative for activity change and chills.   HENT: Negative for congestion and drooling.    Eyes: Negative for pain and discharge.   Respiratory: Negative for cough, choking and shortness of breath.    Cardiovascular: Negative for chest pain and leg swelling.   Gastrointestinal: Negative for  abdominal distention and blood in stool.   Endocrine: Negative for cold intolerance and heat intolerance.   Genitourinary: Negative for difficulty urinating and dysuria.   Musculoskeletal: Negative for arthralgias and gait problem.   Skin: Negative for color change and pallor.   Allergic/Immunologic: Negative for environmental allergies and food allergies.   Neurological: Positive for syncope. Negative for dizziness.   Hematological: Negative for adenopathy. Does not bruise/bleed easily.   Psychiatric/Behavioral: Negative for agitation and confusion.       Objective       Objective      Vital Signs  Temp:  [97.3 °F (36.3 °C)-98.3 °F (36.8 °C)] 97.6 °F (36.4 °C)  Heart Rate:  [] 119  Resp:  [18-20] 20  BP: (140-197)/() 170/90  Vital Signs (last 72 hrs)       02/01 0700  02/02 0659 02/02 0700  02/03 0659 02/03 0700  02/04 0659 02/04 0700  02/04 1357   Most Recent      Temp (°F)     97.3 -  98.3      97.6     97.6 (36.4) 02/04 0700    Heart Rate     77 -  145    97 -  119     119 02/04 1229    Resp       18      20     20 02/04 1100    BP     154/86 -  197/109    140/90 -  174/80     170/90 02/04 1234    SpO2 (%)     95 -  99      99     99 02/04 1100        Body mass index is 31.38 kg/m².  Documented weights    02/03/23 1311 02/04/23 0230 02/04/23 0500   Weight: 77.1 kg (170 lb) 85.5 kg (188 lb 9.6 oz) 85.5 kg (188 lb 9.6 oz)            Intake/Output Summary (Last 24 hours) at 2/4/2023 1357  Last data filed at 2/4/2023 0211  Gross per 24 hour   Intake 2313 ml   Output --   Net 2313 ml     Physical Exam        Results review     Results Review:    I reviewed the patient's new clinical results.  Results from last 7 days   Lab Units 02/03/23  1942 02/03/23  1425   TROPONIN T ng/mL <0.010 <0.010     Results from last 7 days   Lab Units 02/04/23  0357 02/03/23  1425   WBC 10*3/mm3 5.63 6.56   HEMOGLOBIN g/dL 8.9* 10.9*   PLATELETS 10*3/mm3 359 554     Results from last 7 days   Lab Units 02/04/23  0350  02/03/23  1425   SODIUM mmol/L 136 141   POTASSIUM mmol/L 4.0 3.7   CHLORIDE mmol/L 105 106   CO2 mmol/L 21.7* 18.5*   BUN mg/dL 5* 6*   CREATININE mg/dL 0.46* 0.52*   CALCIUM mg/dL 7.5* 8.4*   GLUCOSE mg/dL 92 110*   ALT (SGPT) U/L 68* 82*   AST (SGOT) U/L 105* 120*     Lab Results   Component Value Date    INR 0.93 02/03/2023    INR 0.95 01/17/2023    INR 0.96 11/13/2022    INR 0.93 09/11/2020    INR 0.95 08/30/2020     Lab Results   Component Value Date    MG 1.8 02/04/2023    MG 1.9 02/03/2023    MG 1.8 08/31/2020     Lab Results   Component Value Date    TSH 1.920 02/03/2023    TRIG 45 02/04/2023    HDL 63 (H) 02/04/2023     (H) 02/04/2023      Lab Results   Component Value Date    PROBNP <36.0 02/03/2023    PROBNP 189.8 01/25/2023    PROBNP <36.0 01/17/2023       ECG                         ECG/EMG Results (last 24 hours)     Procedure Component Value Units Date/Time    ECG 12 Lead Tachycardia [[id number]] Collected: 02/03/23 1310     Updated: 02/03/23 1610     QT Interval 290 ms      QTC Interval 444 ms     Narrative:      Test Reason : Tachycardia  Blood Pressure :   */*   mmHG  Vent. Rate : 141 BPM     Atrial Rate : 141 BPM     P-R Int : 120 ms          QRS Dur :  60 ms      QT Int : 290 ms       P-R-T Axes :  50  29  58 degrees     QTc Int : 444 ms    ** Critical Test Result: High HR  Sinus tachycardia  Otherwise normal ECG  When compared with ECG of 25-JAN-2023 18:22,  fusion complexes are no longer present  premature ventricular complexes are no longer present  Confirmed by Waqar Fletcher ([id number]) on 2/3/2023 4:09:17 PM    Referred By: WILILAM           Confirmed By: Waqar Fletcher    ECG 12 Lead Rhythm Change [[id number]] Collected: 02/04/23 0405     Updated: 02/04/23 1020     QT Interval 306 ms      QTC Interval 463 ms     Narrative:      Test Reason : Rhythm Change  Blood Pressure :   */*   mmHG  Vent. Rate : 138 BPM     Atrial Rate : 138 BPM     P-R Int : 116 ms          QRS Dur :  64 ms      QT Int :  306 ms       P-R-T Axes :  69  57  60 degrees     QTc Int : 463 ms    Sinus tachycardia  Possible Left atrial enlargement  Borderline ECG  When compared with ECG of 03-FEB-2023 13:10,  No significant change was found  Confirmed by Waqar Fletcher ([id number]) on 2/4/2023 10:19:40 AM    Referred By:            Confirmed By: Waqar Fletcher    Adult Transthoracic Echo Complete w/ Color, Spectral and Contrast if necessary per protocol [[id number]] Resulted: 02/04/23 1136     Updated: 02/04/23 1139     Target HR (85%) 136 bpm      Max. Pred. HR (100%) 160 bpm      LVIDd 3.7 cm      LVIDs 2.46 cm      IVSd 0.95 cm      LVPWd 1.04 cm      FS 33.5 %      IVS/LVPW 0.92 cm      ESV(cubed) 14.9 ml      LV Sys Vol (BSA corrected) 11.4 cm2      EDV(cubed) 50.7 ml      LV South Vol (BSA corrected) 32.0 cm2      LVOT area 3.1 cm2      LV mass(C)d 111.4 grams      LVOT diam 2.00 cm      EDV(MOD-sp4) 61.6 ml      ESV(MOD-sp4) 21.9 ml      SV(MOD-sp4) 39.7 ml      SI(MOD-sp4) 20.6 ml/m2      EF(MOD-sp4) 64.4 %      MV E max gael 85.9 cm/sec      MV A max gael 116.0 cm/sec      MV E/A 0.74     Med Peak E' Gael 7.4 cm/sec      Lat Peak E' Gael 8.2 cm/sec      Avg E/e' ratio 11.01     TAPSE (>1.6) 2.32 cm      LA dimension (2D)  3.2 cm      Ao pk gael 157.0 cm/sec      Ao max PG 9.9 mmHg      Ao mean PG 5.0 mmHg      Ao V2 VTI 26.2 cm      TR max gael 303.0 cm/sec      TR max PG 36.7 mmHg      RVSP(TR) 46.7 mmHg      RAP systole 10.0 mmHg      PA acc time 0.11 sec      PA pr(Accel) 31.3 mmHg      Ao root diam 2.9 cm      ACS 1.80 cm      EF(MOD-bp) 64.0 %     Narrative:      •  Left ventricular systolic function is normal. Left ventricular ejection   fraction appears to be 61 - 65%.  •  Left ventricular diastolic function is consistent with (grade I)   impaired relaxation.  •  Estimated right ventricular systolic pressure from tricuspid   regurgitation is moderately elevated (45-55 mmHg).  •  Moderate pulmonary hypertension is present.      ECG 12 Lead  Tachycardia [[id number]] Collected: 02/04/23 1241     Updated: 02/04/23 1243     QT Interval 370 ms      QTC Interval 495 ms     Narrative:      Test Reason : Tachycardia  Blood Pressure :   */*   mmHG  Vent. Rate : 108 BPM     Atrial Rate : 108 BPM     P-R Int : 124 ms          QRS Dur :  70 ms      QT Int : 370 ms       P-R-T Axes :  54  33  55 degrees     QTc Int : 495 ms    Sinus tachycardia with occasional premature ventricular complexes  Otherwise normal ECG  When compared with ECG of 04-FEB-2023 04:05,  premature ventricular complexes are now present    Referred By: CINDI           Confirmed By:           Imaging Results (Last 72 Hours)     Procedure Component Value Units Date/Time    CT Abdomen Pelvis Without Contrast [[id number]] Collected: 02/04/23 0131     Updated: 02/04/23 0133    Narrative:      CT Abdomen Pelvis WO    INDICATION:   Abnormal elevated liver enzymes. Tachycardia.    TECHNIQUE:   CT of the abdomen and pelvis without IV contrast. Coronal and sagittal reconstructions were obtained.  Radiation dose reduction techniques included automated exposure control or exposure modulation based on body size. Count of known CT and cardiac nuc  med studies performed in previous 12 months: 5.     COMPARISON:   1/25/2023    FINDINGS:  There is chronic right base atelectasis due to a chronically elevated right hemidiaphragm. There is a hiatal hernia with thickening of the lower esophagus with adjacent fat stranding compatible with esophagitis. There is coronary artery disease. There is  atherosclerotic disease with no aortic aneurysm. Gallbladder is surgically absent. Patient is status post gastric bypass. Gas-filled small bowel loops suggesting ileus. No definite small bowel obstruction is seen. There are a few scattered colonic  diverticula, but there is no evidence of acute diverticulitis or acute colitis. The appendix is normal.    High density material in the urinary bladder is likely excreted MRI contrast  from prior studies this evening. Uterus is surgically absent. There is diffuse hepatic steatosis. No liver mass is identified allowing for the lack of IV contrast. Left adrenal  nodule is unchanged in the short interval. Solid abdominal organs are otherwise normal. There is no adenopathy or free fluid. Patient is status post L5-S1 spinal fusion with laminectomy. There is an old fracture at L2.      Impression:        1. Thickening of the lower esophagus with adjacent fat stranding concerning for esophagitis.  2. Evidence of small bowel ileus. No bowel obstruction. Normal appendix.  3. Hepatic steatosis without focal liver mass.  4. Cholecystectomy, hysterectomy, and gastric bypass.      Signer Name: Bi Herrera MD   Signed: 2/4/2023 1:31 AM   Workstation Name: [id number]    [facility]    MRI Angiogram Neck Without Contrast [[id number]] Collected: 02/04/23 0015     Updated: 02/04/23 0017    Narrative:      MRA Neck WO    INDICATION:    Right-sided weakness    TECHNIQUE:   Axial time-of-flight MRA of the neck with 3-dimensional reformats.  Evaluation for a significant carotid arterial stenosis is based on the NASCET criteria.    COMPARISON:    None available.    FINDINGS:  Normal flow-related signal within the cervical carotid arteries and vertebral arteries without flow-limiting stenosis or occlusion.      Impression:      No flow-limiting stenosis or occlusion.    Signer Name: Feng Levi MD   Signed: 2/4/2023 12:15 AM   Workstation Name: [id number]    [facility]    MRI Brain With & Without Contrast [[id number]] Collected: 02/04/23 0014     Updated: 02/04/23 0016    Narrative:      MRI Brain WO W    CLINICAL HISTORY: Ct abdnormality in left chrissy, right side weakness;Tachycardia, unspecified;Unspecified symptoms and signs involving the nervous system.    COMPARISON: CT brain same day.    TECHNIQUE: Multiplanar, multisequence images of the brain were obtained with and  without intravenous contrast.    FINDINGS:    No acute infarct, intracranial hemorrhage, mass effect, hydrocephalus, or midline shift.     0.7 x 0.5 cm T2/FLAIR hyperintense lesion along the anterior surface of the left anterior chrissy with associated susceptibility artifact. No appreciable enhancement.    Scattered foci of FLAIR hyperintensity in the cerebral white matter, nonspecific but likely representing chronic microvascular white matter changes.      Ventricles and cortical sulci are mildly prominent, in keeping with age-related volume loss.  Basal cisterns are clear. Flow voids are preserved in major intracranial vessels.     Paranasal sinuses and mastoid air cells are clear.      Impression:        1.  No acute infarct, hemorrhage, mass effect, or hydrocephalus.  2.  0.7 x 0.5 cm indeterminate T2/FLAIR hyperintense lesion along the anterior chrissy with associated susceptibility artifact and no appreciable enhancement corresponding to abnormality seen on CT. Recommend follow-up study in 3-6 months to document  stability or change. Subsequent study can be done with MR IAC protocol with and without contrast with thin slices through the brainstem.    Signer Name: Feng Levi MD   Signed: 2/4/2023 12:14 AM   Workstation Name: [id number]    [facility]    MRI Angiogram Head Without Contrast [[id number]] Collected: 02/03/23 2326     Updated: 02/03/23 2328    Narrative:      MRA Head WO    INDICATION:    Right-sided weakness, CT abnormality in the chrissy    TECHNIQUE:   Axial time-of-flight MRA of the head with 3-dimensional reformats.    COMPARISON:    CT brain 2/3/2023.    FINDINGS:    Normal flow related signal within the intracranial carotid arteries, middle cerebral arteries, anterior cerebral arteries, intradural vertebral arteries, basilar artery, or posterior cerebral artery without significant stenosis or occlusion. No evidence  of cerebral aneurysm.    CT abnormality is not well visualized  on MRA.      Impression:      1.  No flow-limiting stenosis or occlusion. No cerebral aneurysm.  2.  CT abnormality is not well evaluated on MRA.    Signer Name: Feng Levi MD   Signed: 2/3/2023 11:26 PM   Workstation Name: [id number]    [facility]    CT Head Without Contrast Stroke Protocol [[id number]] Collected: 02/03/23 1840     Updated: 02/03/23 1842    Narrative:      HISTORY:   Neurologic deficit. Acute stroke protocol. Right-sided weakness. Time of scan 6:30 PM read time 6:35 PM    TECHNIQUE:   CT head without contrast. Radiation dose reduction techniques included automated exposure control or exposure modulation based on body size. Radiation audit for number of CT and nuclear cardiology exams performed in the last year 2    COMPARISON:   Head CT from 1/25/2023.    FINDINGS:   There is no displaced calvarial fracture. The visualized paranasal sinuses and mastoid air cells are clear. There is no evidence for acute intracranial hemorrhage or extra-axial fluid collection. The ventricles are normal in size and configuration for  age group. There is no Chiari I malformation. No acute cortical infarct is suspected. Gray-white junction is relatively well-maintained for age group. There is a focus of tissue attenuation anterior to the left side of the chrissy that is about 6 x 6 x 5 mm  dimension. Its not changed from the prior study. It could be exophytic from the chrissy and should be further evaluated with an MRI with and without contrast with attention to the posterior fossa.      Impression:      No acute intracranial abnormality is suspected, but if there is clinical concern for acute CVA, follow-up imaging is recommended.  2. There is a soft tissue lesion associated with the left anterior chrissy measuring about 6 x 6 x 5 mm dimension. It could be a parenchymal lesion exophytic from the chrissy or lesion adjacent to the chrissy. It should be further evaluated with an MRI with and  without contrast  with attention to the brainstem if the patient is a candidate for MRI. This is not changed from the 1/25/2023 exam.    Signer Name: Ingris Harrison MD   Signed: 2/3/2023 6:40 PM   Workstation Name: [id number]    [facility]    XR Chest 1 View [[id number]] Collected: 02/03/23 1525     Updated: 02/03/23 1535    Narrative:      EXAM:    XR Chest, 1 View     EXAM DATE:    2/3/2023 2:55 PM     CLINICAL HISTORY:    sob     TECHNIQUE:    Frontal view of the chest.     COMPARISON:    01/25/2023     FINDINGS:    Lungs:  Unremarkable.  No consolidation.    Pleural space:  Unremarkable.  No pneumothorax.    Heart:  Unremarkable.  No cardiomegaly.    Mediastinum:  Unremarkable.    Bones/joints:  Stable appearance of the right humeral neck likely from  old fracture.    Soft tissues:  Surgical clips GE junction region are noted.       Impression:        Stable chest. No acute cardiopulmonary findings.     This report was finalized on 2/3/2023 3:25 PM by Dr. Marco Govea MD.             Tele: ST with freq PVCs and some SVT        I have discussed my impression and recommendations with the patient and family.    Thank you very much for asking us to be involved in this patient's care.  We will follow along with you.        This document has been electronically signed by Sandra Savage PA-C  February 4, 2023 13:57 EST      Part of this note may be an electronic transcription/translation of spoken language to printed text using the Dragon Dictation System.  Please note that portions of this note were completed with a voice recognition program.  .Electronically signed by Justine Edward MD, 02/04/23, 2:50 PM EST.

## 2023-02-05 LAB
ANION GAP SERPL CALCULATED.3IONS-SCNC: 8 MMOL/L (ref 5–15)
BUN SERPL-MCNC: 3 MG/DL (ref 8–23)
BUN/CREAT SERPL: 6.7 (ref 7–25)
CALCIUM SPEC-SCNC: 7.4 MG/DL (ref 8.6–10.5)
CHLORIDE SERPL-SCNC: 108 MMOL/L (ref 98–107)
CO2 SERPL-SCNC: 20 MMOL/L (ref 22–29)
CREAT SERPL-MCNC: 0.45 MG/DL (ref 0.57–1)
DEPRECATED RDW RBC AUTO: 66.9 FL (ref 37–54)
EGFRCR SERPLBLD CKD-EPI 2021: 110.3 ML/MIN/1.73
ERYTHROCYTE [DISTWIDTH] IN BLOOD BY AUTOMATED COUNT: 22.7 % (ref 12.3–15.4)
GLUCOSE SERPL-MCNC: 93 MG/DL (ref 65–99)
HCT VFR BLD AUTO: 31.1 % (ref 34–46.6)
HGB BLD-MCNC: 8.8 G/DL (ref 12–15.9)
MAGNESIUM SERPL-MCNC: 2.5 MG/DL (ref 1.6–2.4)
MCH RBC QN AUTO: 23.4 PG (ref 26.6–33)
MCHC RBC AUTO-ENTMCNC: 28.3 G/DL (ref 31.5–35.7)
MCV RBC AUTO: 82.7 FL (ref 79–97)
PLATELET # BLD AUTO: 328 10*3/MM3 (ref 140–450)
PMV BLD AUTO: 8.4 FL (ref 6–12)
POTASSIUM SERPL-SCNC: 3.3 MMOL/L (ref 3.5–5.2)
QT INTERVAL: 370 MS
QTC INTERVAL: 495 MS
RBC # BLD AUTO: 3.76 10*6/MM3 (ref 3.77–5.28)
SODIUM SERPL-SCNC: 136 MMOL/L (ref 136–145)
WBC NRBC COR # BLD: 5.18 10*3/MM3 (ref 3.4–10.8)

## 2023-02-05 PROCEDURE — 99232 SBSQ HOSP IP/OBS MODERATE 35: CPT | Performed by: STUDENT IN AN ORGANIZED HEALTH CARE EDUCATION/TRAINING PROGRAM

## 2023-02-05 PROCEDURE — G0378 HOSPITAL OBSERVATION PER HR: HCPCS

## 2023-02-05 PROCEDURE — 63710000001 ONDANSETRON ODT 4 MG TABLET DISPERSIBLE: Performed by: STUDENT IN AN ORGANIZED HEALTH CARE EDUCATION/TRAINING PROGRAM

## 2023-02-05 PROCEDURE — 99232 SBSQ HOSP IP/OBS MODERATE 35: CPT | Performed by: INTERNAL MEDICINE

## 2023-02-05 PROCEDURE — 25010000002 PROCHLORPERAZINE 10 MG/2ML SOLUTION: Performed by: HOSPITALIST

## 2023-02-05 PROCEDURE — 83735 ASSAY OF MAGNESIUM: CPT | Performed by: STUDENT IN AN ORGANIZED HEALTH CARE EDUCATION/TRAINING PROGRAM

## 2023-02-05 PROCEDURE — 25010000002 HEPARIN (PORCINE) PER 1000 UNITS: Performed by: HOSPITALIST

## 2023-02-05 PROCEDURE — 85027 COMPLETE CBC AUTOMATED: CPT | Performed by: STUDENT IN AN ORGANIZED HEALTH CARE EDUCATION/TRAINING PROGRAM

## 2023-02-05 PROCEDURE — 80048 BASIC METABOLIC PNL TOTAL CA: CPT | Performed by: STUDENT IN AN ORGANIZED HEALTH CARE EDUCATION/TRAINING PROGRAM

## 2023-02-05 RX ORDER — POTASSIUM CHLORIDE 20 MEQ/1
40 TABLET, EXTENDED RELEASE ORAL EVERY 4 HOURS
Status: COMPLETED | OUTPATIENT
Start: 2023-02-05 | End: 2023-02-05

## 2023-02-05 RX ORDER — POTASSIUM CHLORIDE 20 MEQ/1
40 TABLET, EXTENDED RELEASE ORAL AS NEEDED
Status: DISCONTINUED | OUTPATIENT
Start: 2023-02-05 | End: 2023-02-07 | Stop reason: HOSPADM

## 2023-02-05 RX ORDER — ONDANSETRON 4 MG/1
4 TABLET, ORALLY DISINTEGRATING ORAL EVERY 6 HOURS PRN
Status: DISCONTINUED | OUTPATIENT
Start: 2023-02-05 | End: 2023-02-07 | Stop reason: HOSPADM

## 2023-02-05 RX ORDER — LOSARTAN POTASSIUM 50 MG/1
50 TABLET ORAL
Status: DISCONTINUED | OUTPATIENT
Start: 2023-02-06 | End: 2023-02-07 | Stop reason: HOSPADM

## 2023-02-05 RX ORDER — POTASSIUM CHLORIDE 1.5 G/1.77G
40 POWDER, FOR SOLUTION ORAL AS NEEDED
Status: DISCONTINUED | OUTPATIENT
Start: 2023-02-05 | End: 2023-02-07 | Stop reason: HOSPADM

## 2023-02-05 RX ORDER — POTASSIUM CHLORIDE 7.45 MG/ML
10 INJECTION INTRAVENOUS
Status: DISCONTINUED | OUTPATIENT
Start: 2023-02-05 | End: 2023-02-07 | Stop reason: HOSPADM

## 2023-02-05 RX ADMIN — Medication 10 ML: at 21:06

## 2023-02-05 RX ADMIN — POTASSIUM CHLORIDE 40 MEQ: 1500 TABLET, EXTENDED RELEASE ORAL at 05:14

## 2023-02-05 RX ADMIN — Medication 10 ML: at 09:58

## 2023-02-05 RX ADMIN — METOPROLOL TARTRATE 25 MG: 25 TABLET, FILM COATED ORAL at 21:05

## 2023-02-05 RX ADMIN — LOSARTAN POTASSIUM 25 MG: 25 TABLET, FILM COATED ORAL at 09:51

## 2023-02-05 RX ADMIN — PROCHLORPERAZINE EDISYLATE 2.5 MG: 5 INJECTION, SOLUTION INTRAMUSCULAR; INTRAVENOUS at 05:57

## 2023-02-05 RX ADMIN — HEPARIN SODIUM 5000 UNITS: 5000 INJECTION INTRAVENOUS; SUBCUTANEOUS at 09:51

## 2023-02-05 RX ADMIN — SODIUM CHLORIDE 125 ML/HR: 9 INJECTION, SOLUTION INTRAVENOUS at 03:46

## 2023-02-05 RX ADMIN — PANTOPRAZOLE SODIUM 40 MG: 40 TABLET, DELAYED RELEASE ORAL at 05:14

## 2023-02-05 RX ADMIN — HEPARIN SODIUM 5000 UNITS: 5000 INJECTION INTRAVENOUS; SUBCUTANEOUS at 21:05

## 2023-02-05 RX ADMIN — ONDANSETRON 4 MG: 4 TABLET, ORALLY DISINTEGRATING ORAL at 21:05

## 2023-02-05 RX ADMIN — POTASSIUM CHLORIDE 40 MEQ: 1500 TABLET, EXTENDED RELEASE ORAL at 09:51

## 2023-02-05 RX ADMIN — METOPROLOL TARTRATE 12.5 MG: 25 TABLET, FILM COATED ORAL at 09:58

## 2023-02-05 NOTE — PROGRESS NOTES
LOS: 0 days     Name: Ana Laura Jones  Age/Sex: 62 y.o. female  :  1960        PCP: Annita Aadmes APRN  REF: No Known Provider    Principal Problem:    Syncope      Reason for follow-up: syncope, reported SVT    Subjective       Subjective      Ana Laura Jones is a 63 yo female admitted to this facility overnight on  for syncope and reported SVT with cardiology consultation placed for further evaluation.  She was evaluated in consultation on 23 with echocardiogram and orthostatics recommended, as well as monitoring of electrolytes.     Mrs. Jones is evaluated resting comfortably in bed in room 323 on this AM. She is chest pain free but reports she continues to have palpitations with movement. Orthostatics were reviewed and modifications were made to her blood pressure regimen.          Vital Signs  Temp:  [98.1 °F (36.7 °C)-98.4 °F (36.9 °C)] 98.4 °F (36.9 °C)  Heart Rate:  [] 98  Resp:  [18-20] 18  BP: (133-171)/() 164/88  Vital Signs (last 72 hrs)       02 0700  02/ 0659 / 0700  / 0659 / 0700  / 0659  0700   0927   Most Recent      Temp (°F)   97.3 -  98.3    97.6 -  98.4       98.4 (36.9) / 0600    Heart Rate   77 -  145    90 -  119       98 / 0600    Resp     18    18 -  20       18 / 0600    BP   154/86 -  197/109    133/84 -  174/80       164/88 /05 0600    SpO2 (%)   95 -  99    95 -  99       95 / 0600              Documented weights    23 1311 23 0230 23 0500 23 0501   Weight: 77.1 kg (170 lb) 85.5 kg (188 lb 9.6 oz) 85.5 kg (188 lb 9.6 oz) 84.6 kg (186 lb 9.6 oz)      Body mass index is 31.05 kg/m².    Intake/Output Summary (Last 24 hours) at 2023 09  Last data filed at 2023 0501  Gross per 24 hour   Intake 240 ml   Output --   Net 240 ml     Objective    Objective       Physical Exam:     General Appearance:    Alert, cooperative, in no acute distress   Head:     Normocephalic, without obvious abnormality, atraumatic   Eyes:            Conjunctivae and sclerae normal, no   icterus, no pallor, corneas clear.   Neck:   No adenopathy, supple, trachea midline, no thyromegaly, no   carotid bruit, no JVD   Lungs:     Clear to auscultation,respirations regular, even and                  unlabored    Heart:    Regular rhythm and normal rate, normal S1 and S2, no            murmur, no gallop, no rub, no click   Chest Wall:    No abnormalities observed   Abdomen:     Normal bowel sounds, no masses, no organomegaly, soft        nontender, nondistended, no guarding, no rebound                tenderness   Extremities:   Moves all extremities well, no edema, no cyanosis, no             redness   Pulses:   Pulses palpable and equal bilaterally   Skin:   No bleeding, bruising or rash       Neurologic:             Procedures    Results review       Results Review:   Results from last 7 days   Lab Units 02/05/23  0132 02/04/23  0357 02/03/23  1425   WBC 10*3/mm3 5.18 5.63 6.56   HEMOGLOBIN g/dL 8.8* 8.9* 10.9*   PLATELETS 10*3/mm3 328 359 445     Results from last 7 days   Lab Units 02/05/23  0132 02/04/23  0357 02/03/23  1425   SODIUM mmol/L 136 136 141   POTASSIUM mmol/L 3.3* 4.0 3.7   CHLORIDE mmol/L 108* 105 106   CO2 mmol/L 20.0* 21.7* 18.5*   BUN mg/dL 3* 5* 6*   CREATININE mg/dL 0.45* 0.46* 0.52*   CALCIUM mg/dL 7.4* 7.5* 8.4*   GLUCOSE mg/dL 93 92 110*   ALT (SGPT) U/L  --  68* 82*   AST (SGOT) U/L  --  105* 120*     Results from last 7 days   Lab Units 02/03/23  1942 02/03/23  1425   TROPONIN T ng/mL <0.010 <0.010     Lab Results   Component Value Date    INR 0.93 02/03/2023    INR 0.95 01/17/2023    INR 0.96 11/13/2022    INR 0.93 09/11/2020    INR 0.95 08/30/2020     Lab Results   Component Value Date    MG 2.5 (H) 02/05/2023    MG 1.8 02/04/2023    MG 1.9 02/03/2023     Lab Results   Component Value Date    TSH 1.920 02/03/2023    TRIG 45 02/04/2023    HDL 63 (H) 02/04/2023    LDL  105 (H) 02/04/2023      Imaging Results (Last 48 Hours)     Procedure Component Value Units Date/Time    CT Abdomen Pelvis Without Contrast [[id number]] Collected: 02/04/23 0131     Updated: 02/04/23 0133    Narrative:      CT Abdomen Pelvis WO    INDICATION:   Abnormal elevated liver enzymes. Tachycardia.    TECHNIQUE:   CT of the abdomen and pelvis without IV contrast. Coronal and sagittal reconstructions were obtained.  Radiation dose reduction techniques included automated exposure control or exposure modulation based on body size. Count of known CT and cardiac nuc  med studies performed in previous 12 months: 5.     COMPARISON:   1/25/2023    FINDINGS:  There is chronic right base atelectasis due to a chronically elevated right hemidiaphragm. There is a hiatal hernia with thickening of the lower esophagus with adjacent fat stranding compatible with esophagitis. There is coronary artery disease. There is  atherosclerotic disease with no aortic aneurysm. Gallbladder is surgically absent. Patient is status post gastric bypass. Gas-filled small bowel loops suggesting ileus. No definite small bowel obstruction is seen. There are a few scattered colonic  diverticula, but there is no evidence of acute diverticulitis or acute colitis. The appendix is normal.    High density material in the urinary bladder is likely excreted MRI contrast from prior studies this evening. Uterus is surgically absent. There is diffuse hepatic steatosis. No liver mass is identified allowing for the lack of IV contrast. Left adrenal  nodule is unchanged in the short interval. Solid abdominal organs are otherwise normal. There is no adenopathy or free fluid. Patient is status post L5-S1 spinal fusion with laminectomy. There is an old fracture at L2.      Impression:        1. Thickening of the lower esophagus with adjacent fat stranding concerning for esophagitis.  2. Evidence of small bowel ileus. No bowel obstruction. Normal appendix.  3.  Hepatic steatosis without focal liver mass.  4. Cholecystectomy, hysterectomy, and gastric bypass.      Signer Name: Bi Herrera MD   Signed: 2/4/2023 1:31 AM   Workstation Name: [email or web address]    [facility]    MRI Angiogram Neck Without Contrast [[id number]] Collected: 02/04/23 0015     Updated: 02/04/23 0017    Narrative:      MRA Neck WO    INDICATION:    Right-sided weakness    TECHNIQUE:   Axial time-of-flight MRA of the neck with 3-dimensional reformats.  Evaluation for a significant carotid arterial stenosis is based on the NASCET criteria.    COMPARISON:    None available.    FINDINGS:  Normal flow-related signal within the cervical carotid arteries and vertebral arteries without flow-limiting stenosis or occlusion.      Impression:      No flow-limiting stenosis or occlusion.    Signer Name: Feng Levi MD   Signed: 2/4/2023 12:15 AM   Workstation Name: [id number]    [facility]    MRI Brain With & Without Contrast [[id number]] Collected: 02/04/23 0014     Updated: 02/04/23 0016    Narrative:      MRI Brain WO W    CLINICAL HISTORY: Ct abdnormality in left chrissy, right side weakness;Tachycardia, unspecified;Unspecified symptoms and signs involving the nervous system.    COMPARISON: CT brain same day.    TECHNIQUE: Multiplanar, multisequence images of the brain were obtained with and without intravenous contrast.    FINDINGS:    No acute infarct, intracranial hemorrhage, mass effect, hydrocephalus, or midline shift.     0.7 x 0.5 cm T2/FLAIR hyperintense lesion along the anterior surface of the left anterior chrissy with associated susceptibility artifact. No appreciable enhancement.    Scattered foci of FLAIR hyperintensity in the cerebral white matter, nonspecific but likely representing chronic microvascular white matter changes.      Ventricles and cortical sulci are mildly prominent, in keeping with age-related volume loss.  Basal cisterns are clear. Flow voids  are preserved in major intracranial vessels.     Paranasal sinuses and mastoid air cells are clear.      Impression:        1.  No acute infarct, hemorrhage, mass effect, or hydrocephalus.  2.  0.7 x 0.5 cm indeterminate T2/FLAIR hyperintense lesion along the anterior chrissy with associated susceptibility artifact and no appreciable enhancement corresponding to abnormality seen on CT. Recommend follow-up study in 3-6 months to document  stability or change. Subsequent study can be done with MR IAC protocol with and without contrast with thin slices through the brainstem.    Signer Name: Feng Levi MD   Signed: 2/4/2023 12:14 AM   Workstation Name: [email or web address]    [facility]iology [facility]    MRI Angiogram Head Without Contrast [[id number]] Collected: 02/03/23 2326     Updated: 02/03/23 2328    Narrative:      MRA Head WO    INDICATION:    Right-sided weakness, CT abnormality in the chrissy    TECHNIQUE:   Axial time-of-flight MRA of the head with 3-dimensional reformats.    COMPARISON:    CT brain 2/3/2023.    FINDINGS:    Normal flow related signal within the intracranial carotid arteries, middle cerebral arteries, anterior cerebral arteries, intradural vertebral arteries, basilar artery, or posterior cerebral artery without significant stenosis or occlusion. No evidence  of cerebral aneurysm.    CT abnormality is not well visualized on MRA.      Impression:      1.  No flow-limiting stenosis or occlusion. No cerebral aneurysm.  2.  CT abnormality is not well evaluated on MRA.    Signer Name: Feng Levi MD   Signed: 2/3/2023 11:26 PM   Workstation Name: [id number]    [facility]    CT Head Without Contrast Stroke Protocol [[id number]] Collected: 02/03/23 1840     Updated: 02/03/23 1842    Narrative:      HISTORY:   Neurologic deficit. Acute stroke protocol. Right-sided weakness. Time of scan 6:30 PM read time 6:35 PM    TECHNIQUE:   CT head without contrast. Radiation dose reduction  techniques included automated exposure control or exposure modulation based on body size. Radiation audit for number of CT and nuclear cardiology exams performed in the last year 2    COMPARISON:   Head CT from 1/25/2023.    FINDINGS:   There is no displaced calvarial fracture. The visualized paranasal sinuses and mastoid air cells are clear. There is no evidence for acute intracranial hemorrhage or extra-axial fluid collection. The ventricles are normal in size and configuration for  age group. There is no Chiari I malformation. No acute cortical infarct is suspected. Gray-white junction is relatively well-maintained for age group. There is a focus of tissue attenuation anterior to the left side of the chrissy that is about 6 x 6 x 5 mm  dimension. Its not changed from the prior study. It could be exophytic from the chrissy and should be further evaluated with an MRI with and without contrast with attention to the posterior fossa.      Impression:      No acute intracranial abnormality is suspected, but if there is clinical concern for acute CVA, follow-up imaging is recommended.  2. There is a soft tissue lesion associated with the left anterior chrissy measuring about 6 x 6 x 5 mm dimension. It could be a parenchymal lesion exophytic from the chrissy or lesion adjacent to the chrissy. It should be further evaluated with an MRI with and  without contrast with attention to the brainstem if the patient is a candidate for MRI. This is not changed from the 1/25/2023 exam.    Signer Name: Ingris Harrison MD   Signed: 2/3/2023 6:40 PM   Workstation Name: [email or web address]    [facility]    XR Chest 1 View [[id number]] Collected: 02/03/23 1525     Updated: 02/03/23 1535    Narrative:      EXAM:    XR Chest, 1 View     EXAM DATE:    2/3/2023 2:55 PM     CLINICAL HISTORY:    sob     TECHNIQUE:    Frontal view of the chest.     COMPARISON:    01/25/2023     FINDINGS:    Lungs:  Unremarkable.  No consolidation.    Pleural  space:  Unremarkable.  No pneumothorax.    Heart:  Unremarkable.  No cardiomegaly.    Mediastinum:  Unremarkable.    Bones/joints:  Stable appearance of the right humeral neck likely from  old fracture.    Soft tissues:  Surgical clips GE junction region are noted.       Impression:        Stable chest. No acute cardiopulmonary findings.     This report was finalized on 2/3/2023 3:25 PM by Dr. Marco Govea MD.           No results found for: BNP           ECG              Echo   Results for orders placed during the hospital encounter of 02/03/23    Adult Transthoracic Echo Complete w/ Color, Spectral and Contrast if necessary per protocol    Interpretation Summary  •  Left ventricular systolic function is normal. Left ventricular ejection fraction appears to be 61 - 65%.  •  Left ventricular diastolic function is consistent with (grade I) impaired relaxation.  •  Estimated right ventricular systolic pressure from tricuspid regurgitation is moderately elevated (45-55 mmHg).  •  Moderate pulmonary hypertension is present.          Telemetry:        SR in the 90s-100s       Medication Review:   heparin (porcine), 5,000 Units, Subcutaneous, Q12H  losartan, 25 mg, Oral, Q24H  metoprolol tartrate, 12.5 mg, Oral, Q12H  pantoprazole, 40 mg, Oral, Q AM  potassium chloride, 40 mEq, Oral, Q4H  sodium chloride, 10 mL, Intravenous, Q12H        sodium chloride, 125 mL/hr, Last Rate: 125 mL/hr (02/05/23 0601)        Assessment      Assessment:  Recurrent syncope  SVT        Plan     Recommendations:  #1 cardiac.  Patient with episodes of recurrent syncope over the past few weeks.  She has been experiencing some palpitations and runs of SVT with that.  We will go up on her beta-blockers.  Patient blood pressure is running high and would recommend systolic pressure less than 139.  For stress test tomorrow.  Echocardiogram 5/30/2023 did not show any new wall motion abnormality, mild pulm hypertension    I discussed the patient's  findings and my recommendations with patient and family          This document has been electronically signed by Sandra Savage PA-C  February 5, 2023 09:27 EST      Part of this note may be an electronic transcription/translation of spoken language to printed text using the Dragon Dictation System.    Please note that portions of this note were completed with a voice recognition program.  .Electronically signed by Justine Edward MD, 02/05/23, 2:44 PM EST.

## 2023-02-05 NOTE — PLAN OF CARE
Goal Outcome Evaluation:   Patient resting comfortably at this time. No s/s of any acute distress. IVF continue mikki order. Tolerating the oxygen well on RA. Bed alarm on and call bell in reach. Will continue to follow the POC.

## 2023-02-05 NOTE — PROGRESS NOTES
[facility] HOSPITALIST PROGRESS NOTE     Patient Identification:  Name:  Ana Laura Jones  Age:  62 y.o.  Sex:  female  :  1960  MRN:  [id number]  Visit Number:  [id number]  ROOM: [address] [place] [address]     Primary Care Provider:  Annita Adames APRN    Length of stay in inpatient status:  0    Subjective     Chief Compliant:    Chief Complaint   Patient presents with   • Rapid Heart Rate   • Shortness of Breath       History of Presenting Illness: Patient seen and evaluated in follow-up for recurrent syncopal episodes at home x5.  Patient today denying any acute complaints other than feeling somewhat lightheaded and previously had an episode of SVT in the 180s that lasted approximately 13 minutes before terminating on its own.    Objective     Current Hospital Meds:  heparin (porcine), 5,000 Units, Subcutaneous, Q12H  losartan, 25 mg, Oral, Q24H  metoprolol tartrate, 25 mg, Oral, Q12H  pantoprazole, 40 mg, Oral, Q AM  sodium chloride, 10 mL, Intravenous, Q12H      sodium chloride, 125 mL/hr, Last Rate: 125 mL/hr (23)      ----------------------------------------------------------------------------------------------------------------------  Vital Signs:  Temp:  [97.6 °F (36.4 °C)-98.3 °F (36.8 °C)] 98.1 °F (36.7 °C)  Heart Rate:  [] 111  Resp:  [18-20] 20  BP: (133-192)/() 141/88  SpO2:  [95 %-99 %] 99 %  on   ;   Device (Oxygen Therapy): room air  Body mass index is 31.38 kg/m².      Intake/Output Summary (Last 24 hours) at 2023  Last data filed at 2023 0211  Gross per 24 hour   Intake 2313 ml   Output --   Net 2313 ml      ----------------------------------------------------------------------------------------------------------------------  Physical exam:  Constitutional:  Well-developed and well-nourished.  No acute distress.      HENT:  Head:  Normocephalic and atraumatic.  Mouth:  Moist mucous membranes.    Eyes:  Conjunctivae and EOM are normal. No scleral  icterus.    Cardiovascular: Tachycardic but regular rhythm and normal heart sounds with no murmur.  Pulmonary/Chest:  No respiratory distress, no wheezes, no crackles, with normal breath sounds and good air movement.  Abdominal:  Soft.  Bowel sounds are normal.  No distension and no tenderness.   Musculoskeletal:  No edema, no tenderness, and no deformity.  No red or swollen joints anywhere.  Functional ROM intact.   Neurological:  Alert and oriented to person, place, and time.  No cranial nerve deficit.  No tongue deviation.  No facial droop.  No slurred speech. Intact Sensation throughout  Skin:  Skin is warm and dry. No rash or lesion noted. No pallor.   Peripheral vascular:  Pulses in all 4 extremities with no clubbing, no cyanosis, no edema.  Psychiatric: Appropriate mood and affect, pleasant.   ----------------------------------------------------------------------------------------------------------------------  WBC/HGB/HCT/PLT   5.63/8.9/31.1/359 (02/04 0357)  BUN/CREAT/GLUC/ALT/AST/PRECIOUS/LIP    5/0.46/92/68/105/--/-- (02/04 0357)  LYTES - Na/K/Cl/CO2: 136/4.0/105/21.7* (02/04 0357)        No results found for: URINECX  Blood Culture   Date Value Ref Range Status   02/03/2023 No growth at 24 hours  Preliminary   02/03/2023 No growth at 24 hours  Preliminary       I have personally looked at the labs and they are summarized above.  ----------------------------------------------------------------------------------------------------------------------  Detailed radiology reports for the last 24 hours:  CT Abdomen Pelvis Without Contrast    Result Date: 2/4/2023  1. Thickening of the lower esophagus with adjacent fat stranding concerning for esophagitis. 2. Evidence of small bowel ileus. No bowel obstruction. Normal appendix. 3. Hepatic steatosis without focal liver mass. 4. Cholecystectomy, hysterectomy, and gastric bypass. Signer Name: Bi Herrera MD  Signed: 2/4/2023 1:31 AM  Workstation Name: [email or web address]   [facility]    MRI Angiogram Head Without Contrast    Result Date: 2/3/2023  1.  No flow-limiting stenosis or occlusion. No cerebral aneurysm. 2.  CT abnormality is not well evaluated on MRA. Signer Name: Feng Levi MD  Signed: 2/3/2023 11:26 PM  Workstation Name: [id number]  Radiology [facility]    MRI Angiogram Neck Without Contrast    Result Date: 2/4/2023  No flow-limiting stenosis or occlusion. Signer Name: Feng Levi MD  Signed: 2/4/2023 12:15 AM  Workstation Name: [email or web address]  Radiology [facility]    MRI Brain With & Without Contrast    Result Date: 2/4/2023  1.  No acute infarct, hemorrhage, mass effect, or hydrocephalus. 2.  0.7 x 0.5 cm indeterminate T2/FLAIR hyperintense lesion along the anterior chrissy with associated susceptibility artifact and no appreciable enhancement corresponding to abnormality seen on CT. Recommend follow-up study in 3-6 months to document stability or change. Subsequent study can be done with MR IAC protocol with and without contrast with thin slices through the brainstem. Signer Name: Feng Levi MD  Signed: 2/4/2023 12:14 AM  Workstation Name: [id number]  Radiology [facility]    XR Chest 1 View    Result Date: 2/3/2023    Stable chest. No acute cardiopulmonary findings.  This report was finalized on 2/3/2023 3:25 PM by Dr. Marco Govea MD.      CT Head Without Contrast Stroke Protocol    Result Date: 2/3/2023  No acute intracranial abnormality is suspected, but if there is clinical concern for acute CVA, follow-up imaging is recommended. 2. There is a soft tissue lesion associated with the left anterior chrissy measuring about 6 x 6 x 5 mm dimension. It could be a parenchymal lesion exophytic from the chrissy or lesion adjacent to the chrissy. It should be further evaluated with an MRI with and without contrast with attention to the brainstem if the patient is a candidate for MRI. This is not changed from the 1/25/2023  exam. Signer Name: Ingris Harrison MD  Signed: 2/3/2023 6:40 PM  Workstation Name: [email or web address]  [facility] [place]    Assessment & Plan      Recurrent syncope at home  Supraventricular tachycardia  Transient episode of right-sided weakness  Lactic acidemia, resolved  Esophagitis     Patient admitted earlier this morning with syncopal episodes at home, 5 in the last 3 weeks.  Patient continuously monitored on telemetry with episode of SVT occurring shortly after midday lasting approximately 30 minutes with peak heart rate in the 180s for terminating on its own before any occasions could be given or EKG obtained.  Patient reports feeling lightheaded and dizzy as similar to previous episodes at home prior to syncopal episodes.  Have consulted cardiology, appreciate their input.  Would suspect likely cardiogenic in nature of patient's syncope given timing and similarity of prior events to today's episode and similar events at home.  In regards to transient right-sided weakness neurology is seen and evaluated, reviewed MRI findings and does not feel consistent with acute stroke but does note T2 flair pontine lesion that could be artifact or chronic infarct.  Patient does state that she had a prior pontine stroke in 2011.  Will initiate patient on aspirin as she reports not being on it since her stroke in 2011 despite this being part of clear guideline directed medical therapy.  We will also start patient on statin given LDL greater than 70 per neuro recommendations.  Echocardiogram reviewed and unremarkable other than moderate pulmonary hypertension and grade 1 diastolic dysfunction.  Increase metoprolol to 25 mg twice daily.  Patient also noted to have hypertension and started on low-dose losartan 25 mg.    Copied text in portions of the note has been reviewed and is accurate as of 02/04/23    VTE Prophylaxis:   Mechanical Order History:     None      Pharmalogical Order History:      Ordered     Dose Route  Frequency Stop    02/04/23 0236  heparin (porcine) 5000 UNIT/ML injection 5,000 Units         5,000 Units SC Every 12 Hours Scheduled --                Disposition Home once medically stable and improved    Monroe Pereira DO  [facility] Hospitalist  02/04/23  20:49 EST

## 2023-02-06 ENCOUNTER — APPOINTMENT (OUTPATIENT)
Dept: NUCLEAR MEDICINE | Facility: HOSPITAL | Age: 63
DRG: 312 | End: 2023-02-06
Payer: MEDICARE

## 2023-02-06 ENCOUNTER — APPOINTMENT (OUTPATIENT)
Dept: CARDIOLOGY | Facility: HOSPITAL | Age: 63
DRG: 312 | End: 2023-02-06
Payer: MEDICARE

## 2023-02-06 PROBLEM — I47.1 PAROXYSMAL SVT (SUPRAVENTRICULAR TACHYCARDIA): Status: ACTIVE | Noted: 2023-02-06

## 2023-02-06 LAB
ANION GAP SERPL CALCULATED.3IONS-SCNC: 9.6 MMOL/L (ref 5–15)
BH CV NUCLEAR PRIOR STUDY: 3
BH CV REST NUCLEAR ISOTOPE DOSE: 10.5 MCI
BH CV STRESS BP STAGE 1: NORMAL
BH CV STRESS COMMENTS STAGE 1: NORMAL
BH CV STRESS DOSE REGADENOSON STAGE 1: 0.4
BH CV STRESS DURATION MIN STAGE 1: 0
BH CV STRESS DURATION SEC STAGE 1: 10
BH CV STRESS HR STAGE 1: 133
BH CV STRESS NUCLEAR ISOTOPE DOSE: 30.3 MCI
BH CV STRESS PROTOCOL 1: NORMAL
BH CV STRESS RECOVERY BP: NORMAL MMHG
BH CV STRESS RECOVERY HR: 112 BPM
BH CV STRESS STAGE 1: 1
BUN SERPL-MCNC: 3 MG/DL (ref 8–23)
BUN/CREAT SERPL: 5.8 (ref 7–25)
CALCIUM SPEC-SCNC: 7.9 MG/DL (ref 8.6–10.5)
CHLORIDE SERPL-SCNC: 107 MMOL/L (ref 98–107)
CO2 SERPL-SCNC: 18.4 MMOL/L (ref 22–29)
CREAT SERPL-MCNC: 0.52 MG/DL (ref 0.57–1)
EGFRCR SERPLBLD CKD-EPI 2021: 106.5 ML/MIN/1.73
GLUCOSE SERPL-MCNC: 84 MG/DL (ref 65–99)
MAXIMAL PREDICTED HEART RATE: 160 BPM
PERCENT MAX PREDICTED HR: 83.13 %
POTASSIUM SERPL-SCNC: 4.1 MMOL/L (ref 3.5–5.2)
SODIUM SERPL-SCNC: 135 MMOL/L (ref 136–145)
STRESS BASELINE BP: NORMAL MMHG
STRESS BASELINE HR: 90 BPM
STRESS PERCENT HR: 98 %
STRESS POST PEAK BP: NORMAL MMHG
STRESS POST PEAK HR: 133 BPM
STRESS TARGET HR: 136 BPM

## 2023-02-06 PROCEDURE — A9500 TC99M SESTAMIBI: HCPCS | Performed by: STUDENT IN AN ORGANIZED HEALTH CARE EDUCATION/TRAINING PROGRAM

## 2023-02-06 PROCEDURE — 78452 HT MUSCLE IMAGE SPECT MULT: CPT | Performed by: INTERNAL MEDICINE

## 2023-02-06 PROCEDURE — 93017 CV STRESS TEST TRACING ONLY: CPT

## 2023-02-06 PROCEDURE — 93018 CV STRESS TEST I&R ONLY: CPT | Performed by: INTERNAL MEDICINE

## 2023-02-06 PROCEDURE — 63710000001 ONDANSETRON ODT 4 MG TABLET DISPERSIBLE: Performed by: STUDENT IN AN ORGANIZED HEALTH CARE EDUCATION/TRAINING PROGRAM

## 2023-02-06 PROCEDURE — 25010000002 REGADENOSON 0.4 MG/5ML SOLUTION: Performed by: STUDENT IN AN ORGANIZED HEALTH CARE EDUCATION/TRAINING PROGRAM

## 2023-02-06 PROCEDURE — 99232 SBSQ HOSP IP/OBS MODERATE 35: CPT | Performed by: NURSE PRACTITIONER

## 2023-02-06 PROCEDURE — 0 TECHNETIUM SESTAMIBI: Performed by: STUDENT IN AN ORGANIZED HEALTH CARE EDUCATION/TRAINING PROGRAM

## 2023-02-06 PROCEDURE — 25010000002 HEPARIN (PORCINE) PER 1000 UNITS: Performed by: HOSPITALIST

## 2023-02-06 PROCEDURE — 80048 BASIC METABOLIC PNL TOTAL CA: CPT | Performed by: STUDENT IN AN ORGANIZED HEALTH CARE EDUCATION/TRAINING PROGRAM

## 2023-02-06 PROCEDURE — 78452 HT MUSCLE IMAGE SPECT MULT: CPT

## 2023-02-06 PROCEDURE — 99232 SBSQ HOSP IP/OBS MODERATE 35: CPT | Performed by: STUDENT IN AN ORGANIZED HEALTH CARE EDUCATION/TRAINING PROGRAM

## 2023-02-06 RX ORDER — METOPROLOL TARTRATE 50 MG/1
50 TABLET, FILM COATED ORAL EVERY 12 HOURS SCHEDULED
Status: DISCONTINUED | OUTPATIENT
Start: 2023-02-06 | End: 2023-02-07

## 2023-02-06 RX ORDER — AMLODIPINE BESYLATE 5 MG/1
5 TABLET ORAL
Status: DISCONTINUED | OUTPATIENT
Start: 2023-02-06 | End: 2023-02-07

## 2023-02-06 RX ADMIN — AMLODIPINE BESYLATE 5 MG: 5 TABLET ORAL at 11:34

## 2023-02-06 RX ADMIN — METOPROLOL TARTRATE 25 MG: 25 TABLET, FILM COATED ORAL at 11:33

## 2023-02-06 RX ADMIN — REGADENOSON 0.4 MG: 0.08 INJECTION, SOLUTION INTRAVENOUS at 09:04

## 2023-02-06 RX ADMIN — LOSARTAN POTASSIUM 50 MG: 50 TABLET, FILM COATED ORAL at 11:33

## 2023-02-06 RX ADMIN — ONDANSETRON 4 MG: 4 TABLET, ORALLY DISINTEGRATING ORAL at 08:29

## 2023-02-06 RX ADMIN — TECHNETIUM TC 99M SESTAMIBI 1 DOSE: 1 INJECTION INTRAVENOUS at 07:40

## 2023-02-06 RX ADMIN — METOPROLOL TARTRATE 50 MG: 25 TABLET, FILM COATED ORAL at 21:04

## 2023-02-06 RX ADMIN — ONDANSETRON 4 MG: 4 TABLET, ORALLY DISINTEGRATING ORAL at 18:00

## 2023-02-06 RX ADMIN — Medication 10 ML: at 11:37

## 2023-02-06 RX ADMIN — TECHNETIUM TC 99M SESTAMIBI 1 DOSE: 1 INJECTION INTRAVENOUS at 09:04

## 2023-02-06 RX ADMIN — Medication 10 ML: at 21:04

## 2023-02-06 RX ADMIN — HEPARIN SODIUM 5000 UNITS: 5000 INJECTION INTRAVENOUS; SUBCUTANEOUS at 21:03

## 2023-02-06 RX ADMIN — HEPARIN SODIUM 5000 UNITS: 5000 INJECTION INTRAVENOUS; SUBCUTANEOUS at 11:34

## 2023-02-06 NOTE — PLAN OF CARE
Goal Outcome Evaluation:   Patient resting comfortably at this time. Tolerating the oxygen on RA well. No reports of any acute distress. HOB elevated. Bed alarm refused. Family to bed side. Will continue to follow the POC.

## 2023-02-06 NOTE — PROGRESS NOTES
[facility] HOSPITALIST PROGRESS NOTE     Patient Identification:  Name:  Ana Laura Jones  Age:  62 y.o.  Sex:  female  :  1960  MRN:  [id number]  Visit Number:  [id number]  ROOM: [address] [place] [address]     Primary Care Provider:  Annita Adames APRN    Length of stay in inpatient status:  0    Subjective     Chief Compliant:    Chief Complaint   Patient presents with   • Rapid Heart Rate   • Shortness of Breath       History of Presenting Illness: Patient seen and evaluated in follow-up for recurrent syncopal episodes at home x5.  Patient overnight with intermittent recurrent episodes of tachycardia but as well as with some pauses.  Seen by cardiology and after initially beta-blockade being reduced due to pauses last night reading creased back to previous nighttime dosing.  Patient does note some improvement with her dyspnea but still present.    Objective     Current Hospital Meds:  heparin (porcine), 5,000 Units, Subcutaneous, Q12H  [START ON 2023] losartan, 50 mg, Oral, Q24H  metoprolol tartrate, 25 mg, Oral, Q12H  pantoprazole, 40 mg, Oral, Q AM  sodium chloride, 10 mL, Intravenous, Q12H         ----------------------------------------------------------------------------------------------------------------------  Vital Signs:  Temp:  [98.2 °F (36.8 °C)-98.7 °F (37.1 °C)] 98.4 °F (36.9 °C)  Heart Rate:  [] 95  Resp:  [18] 18  BP: (137-182)/(68-94) 140/79  SpO2:  [95 %-99 %] 99 %  on   ;   Device (Oxygen Therapy): room air  Body mass index is 31.05 kg/m².      Intake/Output Summary (Last 24 hours) at 2023  Last data filed at 2023 1300  Gross per 24 hour   Intake 720 ml   Output --   Net 720 ml      ----------------------------------------------------------------------------------------------------------------------  Physical exam:  Constitutional:  Well-developed and well-nourished.  No acute distress.      HENT:  Head:  Normocephalic and atraumatic.  Mouth:  Moist mucous  membranes.    Eyes:  Conjunctivae and EOM are normal. No scleral icterus.    Cardiovascular: Tachycardic but regular rhythm and normal heart sounds with no murmur.  Pulmonary/Chest:  No respiratory distress, no wheezes, no crackles, with normal breath sounds and good air movement.  Abdominal:  Soft.  Bowel sounds are normal.  No distension and no tenderness.   Musculoskeletal:  No edema, no tenderness, and no deformity.  No red or swollen joints anywhere.  Functional ROM intact.   Neurological:  Alert and oriented to person, place, and time.  No cranial nerve deficit.  No tongue deviation.  No facial droop.  No slurred speech. Intact Sensation throughout  Skin:  Skin is warm and dry. No rash or lesion noted. No pallor.   Peripheral vascular:  Pulses in all 4 extremities with no clubbing, no cyanosis, no edema.  Psychiatric: Appropriate mood and affect, pleasant.   ----------------------------------------------------------------------------------------------------------------------  WBC/HGB/HCT/PLT   5.18/8.8/31.1/328 (02/05 0132)  BUN/CREAT/GLUC/ALT/AST/PRECIOUS/LIP    3/0.45/93/--/--/--/-- (02/05 0132)  LYTES - Na/K/Cl/CO2: 136/3.3*/108*/20.0* (02/05 0132)        No results found for: URINECX  Blood Culture   Date Value Ref Range Status   02/03/2023 No growth at 24 hours  Preliminary   02/03/2023 No growth at 24 hours  Preliminary       I have personally looked at the labs and they are summarized above.  ----------------------------------------------------------------------------------------------------------------------  Detailed radiology reports for the last 24 hours:  CT Abdomen Pelvis Without Contrast    Result Date: 2/4/2023  1. Thickening of the lower esophagus with adjacent fat stranding concerning for esophagitis. 2. Evidence of small bowel ileus. No bowel obstruction. Normal appendix. 3. Hepatic steatosis without focal liver mass. 4. Cholecystectomy, hysterectomy, and gastric bypass. Signer Name: Bi  MD Javier  Signed: 2/4/2023 1:31 AM  Workstation Name: [email or web address]  Radiology Psychiatric    MRI Angiogram Head Without Contrast    Result Date: 2/3/2023  1.  No flow-limiting stenosis or occlusion. No cerebral aneurysm. 2.  CT abnormality is not well evaluated on MRA. Signer Name: Feng Levi MD  Signed: 2/3/2023 11:26 PM  Workstation Name: [email or web address]  Radiology Psychiatric    MRI Angiogram Neck Without Contrast    Result Date: 2/4/2023  No flow-limiting stenosis or occlusion. Signer Name: Feng Levi MD  Signed: 2/4/2023 12:15 AM  [facility]station [facility]: [email or web address]  [facility]iology [facility]    MRI Brain With & Without Contrast    Result Date: 2/4/2023  1.  No acute infarct, hemorrhage, mass effect, or hydrocephalus. 2.  0.7 x 0.5 cm indeterminate T2/FLAIR hyperintense lesion along the anterior chrissy with associated susceptibility artifact and no appreciable enhancement corresponding to abnormality seen on CT. Recommend follow-up study in 3-6 months to document stability or change. Subsequent study can be done with MR IAC protocol with and without contrast with thin slices through the brainstem. Signer Name: Feng Levi MD  Signed: 2/4/2023 12:14 AM  Workstation Name: [email or web address]  [facility]    Assessment & Plan      Recurrent syncope at home  Supraventricular tachycardia    -Continue beta-blockade, initially reduced this morning due to pauses overnight but increased back to 25 mg by cardiology will closely monitor for recurrent pauses    -Transthoracic echocardiogram shows moderate pulmonary hypertension and grade 1 diastolic dysfunction otherwise no significant findings    -Cardiology consulted and following along recommended stress test, will order for the a.m.    -N.p.o. 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    -Continue aspirin and statin.    Hypertension    -Continue losartan    Lactic acidemia, resolved  Esophagitis      Copied text in portions of the note has been reviewed and is accurate as of 02/05/23    VTE Prophylaxis:   Mechanical Order History:     None      Pharmalogical Order History:      Ordered     Dose Route Frequency Stop    02/04/23 0236  heparin (porcine) 5000 UNIT/ML injection 5,000 Units         5,000 Units SC Every 12 Hours Scheduled --                Disposition Home once medically stable and improved    Monroe Pereira DO  [facility] Hospitalist  02/05/23  19:48 EST

## 2023-02-06 NOTE — PAYOR COMM NOTE
"[facility]  NPI:[id number]    Utilization Review  Contact: Rubia Swanson RN  Phone: [phone]  Fax:[phone]    INITIATE INPATIENT AUTHORIZATION   Observation 2/3/2023  converted to inpatient 2023  //  Mallory Jones (62 y.o. Female)     Date of Birth   1960    Social Security Number       Address   [address] [address]    Home Phone   [phone]    MRN   [id number]       Mu-ism   Confucianism    Marital Status                              // Admission Date   2/3/23    Admission Type   Emergency    Admitting Provider   Rodriguez Alan MD    Attending Provider   Monroe Pereira DO    Department, Room/Bed   [address], 3323/       Discharge Date       Discharge Disposition       Discharge Destination                               Attending Provider: Monroe Pereira DO    Allergies: Vancomycin    Isolation: None   Infection: None   Code Status: CPR    Ht: 165.1 cm (65\")   Wt: 84.1 kg (185 lb 6.4 oz)    Admission Cmt: None   Principal Problem: Syncope [R55]                 Active Insurance as of 2/3/2023     Primary Coverage     Payor Plan Insurance Group Employer/Plan Group    [facility] COMMUNITY PLAN [facility] COMMUNITY PLAN [facility]     Payor Plan Address Payor Plan Phone Number Payor Plan Fax Number Effective Dates    [address]   2023 - None Entered    [facility] [phone]       Subscriber Name Subscriber Birth Date Member ID       MALLORY JONES 1960 [id number]                 Emergency Contacts      (Rel.) Home Phone Work Phone Mobile Phone    Florida Littlejohn (Sister) [phone] -- --    Bernardino Jnoes (Spouse) [phone] -- --               History & Physical      Rodriguez Alan MD at 23 0154          Hospitalist History and Physical        Patient Identification  Name: Mallory Jonse  Age/Sex: 62 y.o. female  :  1960        MRN: [id number]  Visit Number: " [id number]  Admit Date: 2/3/2023   PCP: Annita Adames APRN          Chief complaint heart racing    History of Present Illness:  Patient is a 62 y.o. female with history of gastric bypass surgery and cholecystectomy who presents with complaints of tachycardia. She reports intermittent tachycardia for some time now. Over the last 3 weeks she has suffered 5 syncopal episodes, for which she presented to our ED on 1/17/23. At that time, vertigo was in the differential per ED note. UA was concerning for UTI though she had no urinary symptoms. UDS was positive for THC and patient admitted to taking THC-containing gummies. She was advised to stop taking these and sent home with a prescription for macrobid. She returned to the ED on 1/25/23 with complaints of dizziness and recurrent syncope and was found to be tachycardic in the 140s. She continued to deny urinary symptoms. UA showed persistently positive nitrite, leuk esterase and 4+ bacteria though WBC had improved from 21-30 to 3-5. Review of urine culture from 1/17/23 showed she grew E coli which was pan-sensitive but she was placed on levaquin with thoughts that the macrobid may not be strong enough and thus could be why her tachycardia and syncope were persisting. She has completed her levaquin course and still denies any urinary symptoms, yet dizziness and tachycardia have persisted. She also has experienced more nausea and some periumbilical abdominal pain. She has not experienced any further syncopal episodes but continues to get lightheaded with standing and heart races more with standing as well. She was referred to cardiology from one of her ED visits and saw the cardiologist in the clinic for the first time on 2/3. At time of this visit, her heart rate was in the 140s again and the cardiologist referred her back to the ED for further evaluation. While in the ED waiting area, patient developed acute onset right sided weakness and neurology was  consulted. Patient denies chest pain, dyspnea, or lower extremity edema. She denies any illicit drug use. She underwent CT head that showed no acute intracranial abnormality but mentioned a soft tissue lesion associated with the left anterior chrissy for which MRI was recommended. MRI brain without contrast showed an indeterminate T2/FLAIR hyperintense lesion along with anterior chrissy with associated susceptibility artifact and no appreciable enhancement, for which radiology recommended follow up study in 3-6 months to document stability or change. Patient's right sided weakness has since resolved and heart rate is currently improved to the 110s. Her SBP has been elevated since arrival, ranging form 150s to 190s. Labs revealed mild alkalosis on ABG, bicarb 18 on CMP, with elevated anion gap 16, elevated alk phos 130, ALT 82, and  (all or most have been mildly elevated since 11/13/22 but are slightly higher today). WBC, CRP and procalcitonin are normal though lactate was elevated at 2.8 and has subsequently trended down to 2.3. Troponin is negative x2, BNP is negative, and EKG showed sinus tachycardia but no overt ST changes to suggest acute ischemia. Patient is being admitted to the telemetry unit for further workup and management.     Review of Systems  Review of Systems   Constitutional: Positive for activity change. Negative for appetite change, chills, diaphoresis, fever and unexpected weight change.   HENT: Negative for congestion, postnasal drip, rhinorrhea, sinus pressure, sinus pain and sore throat.    Eyes: Negative for photophobia, pain, discharge, redness, itching and visual disturbance.   Respiratory: Negative for cough, shortness of breath and wheezing.    Cardiovascular: Positive for palpitations. Negative for chest pain and leg swelling.   Gastrointestinal: Positive for abdominal pain and nausea. Negative for abdominal distention, constipation, diarrhea and vomiting.   Endocrine: Negative for cold  intolerance, heat intolerance, polydipsia, polyphagia and polyuria.   Genitourinary: Negative for difficulty urinating, dysuria, flank pain, frequency and hematuria.   Musculoskeletal: Negative for arthralgias, back pain, joint swelling, myalgias, neck pain and neck stiffness.   Skin: Positive for color change (bruises on feet from recent syncopal events/falls). Negative for pallor, rash and wound.   Neurological: Positive for dizziness, syncope, weakness (generalized currently, with right sided weakness earlier in the ED) and light-headedness. Negative for tremors, seizures and numbness.   Hematological: Negative for adenopathy. Does not bruise/bleed easily.   Psychiatric/Behavioral: Negative for agitation, behavioral problems and confusion.       History  Past Medical History:   Diagnosis Date   • Shoulder fracture      Past Surgical History:   Procedure Laterality Date   • BLADDER REPAIR     • CHOLECYSTECTOMY     • GASTRIC BYPASS     • HYSTERECTOMY     • LASIK     • LUMBAR DISC SURGERY      L5 fusion   • ORIF HUMERUS FRACTURE Right 8/30/2020    Procedure: RIGHT SHOULDER HUMERUS PROXIMAL OPEN REDUCTION INTERNAL FIXATION WITH PLATE AND SCREW;  Surgeon: Eleazar Núñez MD;  Location: [facility];  Service: Orthopedics;  Laterality: Right;   • TOTAL SHOULDER REPLACEMENT      right      Family History   Problem Relation Age of Onset   • Stomach cancer Mother    • No Known Problems Father      Social History     Tobacco Use   • Smoking status: Never   • Smokeless tobacco: Never   Substance Use Topics   • Alcohol use: No   • Drug use: No     (Not in a hospital admission)    Allergies:  Vancomycin    Objective      Vital Signs  Temp:  [97.3 °F (36.3 °C)-98 °F (36.7 °C)] 97.3 °F (36.3 °C)  Heart Rate:  [113-145] 118  Resp:  [18] 18  BP: (164-197)/() 192/99  Body mass index is 28.29 kg/m².    Physical Exam:  Physical Exam  Constitutional:       General: She is not in acute distress.     Appearance: She is obese.  She is not ill-appearing.   HENT:      Head: Normocephalic and atraumatic.      Right Ear: External ear normal.      Left Ear: External ear normal.      Nose: Nose normal.      Mouth/Throat:      Mouth: Mucous membranes are moist.      Pharynx: Oropharynx is clear.   Eyes:      Extraocular Movements: Extraocular movements intact.      Conjunctiva/sclera: Conjunctivae normal.      Pupils: Pupils are equal, round, and reactive to light.   Cardiovascular:      Rate and Rhythm: Regular rhythm. Tachycardia present.      Pulses: Normal pulses.      Heart sounds: Normal heart sounds. No murmur heard.  Pulmonary:      Effort: Pulmonary effort is normal. No respiratory distress.      Breath sounds: Normal breath sounds. No wheezing or rales.   Abdominal:      General: Abdomen is flat. Bowel sounds are normal. There is no distension.      Palpations: Abdomen is soft.      Tenderness: There is abdominal tenderness (minimal, around periumbilical region).   Musculoskeletal:         General: Normal range of motion.      Cervical back: Normal range of motion and neck supple. No tenderness.      Right lower leg: No edema.      Left lower leg: No edema.   Lymphadenopathy:      Cervical: No cervical adenopathy.   Skin:     General: Skin is warm and dry.      Capillary Refill: Capillary refill takes less than 2 seconds.      Comments: Mild bruising around ankles   Neurological:      General: No focal deficit present.      Mental Status: She is alert and oriented to person, place, and time.   Psychiatric:         Mood and Affect: Mood normal.         Behavior: Behavior normal.         Thought Content: Thought content normal.         Judgment: Judgment normal.           Results Review:       Lab Results:  Results from last 7 days   Lab Units 02/03/23  1425   WBC 10*3/mm3 6.56   HEMOGLOBIN g/dL 10.9*   PLATELETS 10*3/mm3 445     Results from last 7 days   Lab Units 02/03/23  1425   CRP mg/dL <0.30     Results from last 7 days   Lab Units  02/03/23  1425   SODIUM mmol/L 141   POTASSIUM mmol/L 3.7   CHLORIDE mmol/L 106   CO2 mmol/L 18.5*   BUN mg/dL 6*   CREATININE mg/dL 0.52*   CALCIUM mg/dL 8.4*   GLUCOSE mg/dL 110*     Results from last 7 days   Lab Units 02/03/23  1425   MAGNESIUM mg/dL 1.9     Hemoglobin A1C   Date Value Ref Range Status   02/03/2023 4.60 (L) 4.80 - 5.60 % Final     Results from last 7 days   Lab Units 02/03/23  1425   BILIRUBIN mg/dL 0.4   ALK PHOS U/L 130*   AST (SGOT) U/L 120*   ALT (SGPT) U/L 82*     Results from last 7 days   Lab Units 02/03/23  1942 02/03/23  1425   TROPONIN T ng/mL <0.010 <0.010         Results from last 7 days   Lab Units 02/03/23  1425   INR  0.93     Results from last 7 days   Lab Units 02/03/23  1414   PH, ARTERIAL pH units 7.473*   PO2 ART mm Hg 86.6   PCO2, ARTERIAL mm Hg 28.2*   HCO3 ART mmol/L 20.7       I have reviewed the patient's laboratory results.    Imaging:  Imaging Results (Last 72 Hours)     Procedure Component Value Units Date/Time    CT Abdomen Pelvis Without Contrast [[id number]] Collected: 02/04/23 0131     Updated: 02/04/23 0133    Narrative:      CT Abdomen Pelvis WO    INDICATION:   Abnormal elevated liver enzymes. Tachycardia.    TECHNIQUE:   CT of the abdomen and pelvis without IV contrast. Coronal and sagittal reconstructions were obtained.  Radiation dose reduction techniques included automated exposure control or exposure modulation based on body size. Count of known CT and cardiac nuc  med studies performed in previous 12 months: 5.     COMPARISON:   1/25/2023    FINDINGS:  There is chronic right base atelectasis due to a chronically elevated right hemidiaphragm. There is a hiatal hernia with thickening of the lower esophagus with adjacent fat stranding compatible with esophagitis. There is coronary artery disease. There is  atherosclerotic disease with no aortic aneurysm. Gallbladder is surgically absent. Patient is status post gastric bypass. Gas-filled small bowel loops  suggesting ileus. No definite small bowel obstruction is seen. There are a few scattered colonic  diverticula, but there is no evidence of acute diverticulitis or acute colitis. The appendix is normal.    High density material in the urinary bladder is likely excreted MRI contrast from prior studies this evening. Uterus is surgically absent. There is diffuse hepatic steatosis. No liver mass is identified allowing for the lack of IV contrast. Left adrenal  nodule is unchanged in the short interval. Solid abdominal organs are otherwise normal. There is no adenopathy or free fluid. Patient is status post L5-S1 spinal fusion with laminectomy. There is an old fracture at L2.      Impression:        1. Thickening of the lower esophagus with adjacent fat stranding concerning for esophagitis.  2. Evidence of small bowel ileus. No bowel obstruction. Normal appendix.  3. Hepatic steatosis without focal liver mass.  4. Cholecystectomy, hysterectomy, and gastric bypass.      Signer Name: Bi Herrera MD   Signed: 2/4/2023 1:31 AM   Workstation Name: [id number]    [facility]    MRI Angiogram Neck Without Contrast [[id number]] Collected: 02/04/23 0015     Updated: 02/04/23 0017    Narrative:      MRA Neck WO    INDICATION:    Right-sided weakness    TECHNIQUE:   Axial time-of-flight MRA of the neck with 3-dimensional reformats.  Evaluation for a significant carotid arterial stenosis is based on the NASCET criteria.    COMPARISON:    None available.    FINDINGS:  Normal flow-related signal within the cervical carotid arteries and vertebral arteries without flow-limiting stenosis or occlusion.      Impression:      No flow-limiting stenosis or occlusion.    Signer Name: Feng Levi MD   Signed: 2/4/2023 12:15 AM   Workstation Name: [id number]    [facility]    MRI Brain With & Without Contrast [[id number]] Collected: 02/04/23 0014     Updated: 02/04/23 0016    Narrative:      MRI  Brain WO W    CLINICAL HISTORY: Ct abdnormality in left chrissy, right side weakness;Tachycardia, unspecified;Unspecified symptoms and signs involving the nervous system.    COMPARISON: CT brain same day.    TECHNIQUE: Multiplanar, multisequence images of the brain were obtained with and without intravenous contrast.    FINDINGS:    No acute infarct, intracranial hemorrhage, mass effect, hydrocephalus, or midline shift.     0.7 x 0.5 cm T2/FLAIR hyperintense lesion along the anterior surface of the left anterior chrissy with associated susceptibility artifact. No appreciable enhancement.    Scattered foci of FLAIR hyperintensity in the cerebral white matter, nonspecific but likely representing chronic microvascular white matter changes.      Ventricles and cortical sulci are mildly prominent, in keeping with age-related volume loss.  Basal cisterns are clear. Flow voids are preserved in major intracranial vessels.     Paranasal sinuses and mastoid air cells are clear.      Impression:        1.  No acute infarct, hemorrhage, mass effect, or hydrocephalus.  2.  0.7 x 0.5 cm indeterminate T2/FLAIR hyperintense lesion along the anterior chrissy with associated susceptibility artifact and no appreciable enhancement corresponding to abnormality seen on CT. Recommend follow-up study in 3-6 months to document  stability or change. Subsequent study can be done with MR IAC protocol with and without contrast with thin slices through the brainstem.    Signer Name: Feng Levi MD   Signed: 2/4/2023 12:14 AM   Workstation Name: [id number]    [facility]    MRI Angiogram Head Without Contrast [[id number]] Collected: 02/03/23 2326     Updated: 02/03/23 2328    Narrative:      MRA Head WO    INDICATION:    Right-sided weakness, CT abnormality in the chrissy    TECHNIQUE:   Axial time-of-flight MRA of the head with 3-dimensional reformats.    COMPARISON:    CT brain 2/3/2023.    FINDINGS:    Normal flow related signal  within the intracranial carotid arteries, middle cerebral arteries, anterior cerebral arteries, intradural vertebral arteries, basilar artery, or posterior cerebral artery without significant stenosis or occlusion. No evidence  of cerebral aneurysm.    CT abnormality is not well visualized on MRA.      Impression:      1.  No flow-limiting stenosis or occlusion. No cerebral aneurysm.  2.  CT abnormality is not well evaluated on MRA.    Signer Name: Feng Levi MD   Signed: 2/3/2023 11:26 PM   Workstation Name: [id number]    [facility]    CT Head Without Contrast Stroke Protocol [[id number]] Collected: 02/03/23 1840     Updated: 02/03/23 1842    Narrative:      HISTORY:   Neurologic deficit. Acute stroke protocol. Right-sided weakness. Time of scan 6:30 PM read time 6:35 PM    TECHNIQUE:   CT head without contrast. Radiation dose reduction techniques included automated exposure control or exposure modulation based on body size. Radiation audit for number of CT and nuclear cardiology exams performed in the last year 2    COMPARISON:   Head CT from 1/25/2023.    FINDINGS:   There is no displaced calvarial fracture. The visualized paranasal sinuses and mastoid air cells are clear. There is no evidence for acute intracranial hemorrhage or extra-axial fluid collection. The ventricles are normal in size and configuration for  age group. There is no Chiari I malformation. No acute cortical infarct is suspected. Gray-white junction is relatively well-maintained for age group. There is a focus of tissue attenuation anterior to the left side of the chrissy that is about 6 x 6 x 5 mm  dimension. Its not changed from the prior study. It could be exophytic from the chrissy and should be further evaluated with an MRI with and without contrast with attention to the posterior fossa.      Impression:      No acute intracranial abnormality is suspected, but if there is clinical concern for acute CVA, follow-up imaging  is recommended.  2. There is a soft tissue lesion associated with the left anterior chrissy measuring about 6 x 6 x 5 mm dimension. It could be a parenchymal lesion exophytic from the chrissy or lesion adjacent to the chrissy. It should be further evaluated with an MRI with and  without contrast with attention to the brainstem if the patient is a candidate for MRI. This is not changed from the 1/25/2023 exam.    Signer Name: Ingris Harrison MD   Signed: 2/3/2023 6:40 PM   Workstation Name: [email or web address]    [facility]    XR Chest 1 View [[id number]] Collected: 02/03/23 1525     Updated: 02/03/23 1535    Narrative:      EXAM:    XR Chest, 1 View     EXAM DATE:    2/3/2023 2:55 PM     CLINICAL HISTORY:    sob     TECHNIQUE:    Frontal view of the chest.     COMPARISON:    01/25/2023     FINDINGS:    Lungs:  Unremarkable.  No consolidation.    Pleural space:  Unremarkable.  No pneumothorax.    Heart:  Unremarkable.  No cardiomegaly.    Mediastinum:  Unremarkable.    Bones/joints:  Stable appearance of the right humeral neck likely from  old fracture.    Soft tissues:  Surgical clips GE junction region are noted.       Impression:        Stable chest. No acute cardiopulmonary findings.     This report was finalized on 2/3/2023 3:25 PM by Dr. Marco Govea MD.             I have personally reviewed the patient's radiologic imaging.        EKG:   Sinus tachycardia, , QTc 444  Otherwise normal ECG  When compared with ECG of 25-JAN-2023 18:22,  fusion complexes are no longer present  premature ventricular complexes are no longer present  Confirmed by Waqar Fletcher ([id number]) on 2/3/2023 4:09:17 PM    I have personally reviewed the patient's EKG.        Assessment & Plan     - Tachycardia, lightheadedness, frequent syncopal episodes with standing (5 in the last 3 weeks): admit to telemetry. Troponin negative x2. Continue to monitor on telemetry. HR improved with IV fluids, will continue maintenance fluids at this  time. Check orthostatics qshift. Repeat UA to ensure clearance of UTI as recently diagnosed with UTI on subsequent ED visits and treated with macrobid followed by levaquin. Obtain UDS to look for potential contributing substance as well (prior was positive for THC). Obtain ECHO in the morning. CT head and MRI brain with indeterminate left chrissy lesion which radiology is recommending f/u MRI in 3-6 months. Consult neurology for further guidance given these findings coupled with transient right sided weakness witnessed in the ED.    - Transient right sided weakness: etiology unclear. Evaluated by neurology in the ED. This was before MRI findings were resulted. Consult neuro to re-evaluate in the morning. Monitor BP as uncontrolled hypertension could be contributing to symptoms. Low dose metoprolol added.   - Lactic acidosis: etiology unclear. No infectious source identified at this time though UA still pending as noted above. Normal WBC, CRP and procal levels would argue against underlying infection, however. Possibly related to sustained severe tachycardia? Improving with IV fluids as is heart rate. Repeat level later this morning.    - Mild transaminitis: CT abdomen/pelvis showed fatty liver which is likely contributing as LFTs have been elevated prior to today. Recent levaquin use may have raised levels further. Acute hepatitis panel negative. Continue to monitor.   - Esophagitis, small bowel ileus seen on CT abdomen/pelvis: initiate PO protonix daily starting now. Patient reports she has been eating and drinking well in recent days. Abdomen is soft on exam with normal bowel sounds. Monitor for signs/symptoms of worsening ileus.    DVT/GI Prophylaxis: SQ heparin; PO protonix    Estimated Length of Stay <2 midnights (observation)    I discussed the patient's findings, assessment and plan with the patient, her sister at bedside, and ED provider Dr Haro.    * Patient is high risk due to tachycardia, recurrent syncope  and presyncopal symptoms, transient right sided weakness, lactic acidosis    Rodriguez Alan MD  02/04/23  01:54 EST      Electronically signed by Rodriguez Alan MD at 02/04/23 0254          Emergency Department Notes      Pierre Jha at 02/03/23 1312        EKG completed @ 1310 and given to Dr. Bauer      Electronically signed by Pierre Jha at 02/03/23 1313     Jovita Horne PA at 02/03/23 1357     Attestation signed by Khurram Bauer MD at 02/04/23 0734    I was available to the NP or PA for any questions, concerns, or assistance requested regarding this patient encounter.    Electronically signed by Khurram Bauer MD, 02/04/23, 7:34 AM EST.                         MEDICAL SCREENING:    Reason for Visit: sent by Dr. Peoples for elevated HR and concern for sepsis, patient complains of dizziness and sob.    Patient initially seen in triage.  The patient was advised further evaluation and diagnostic testing will be needed, some of the treatment and testing will be initiated in the lobby in order to begin the process.  The patient will be returned to the waiting area for the time being and possibly be re-assessed by a subsequent ED provider.  The patient will be brought back to the treatment area in as timely manner as possible.       Jovita Horne PA  02/03/23 5811      Electronically signed by Khurram Bauer MD at 02/04/23 5052     Macario Gongora, RN at 02/03/23 2000        Pt seen in the lobby and update on time to room. Pt states no change in condition and that she does need anything at this time.  Pt present with nasd at this time and was educated on presenting to triage nurse if condition changes.  Pt verbalized understanding     Electronically signed by Macario Gongora, RN at 02/03/23 8028     Emerita Deras, RN at 02/03/23 0893        Another patient from the lobby came up to triage and said this patient needs to be seen. Patient was reassessed in the lobby.  During reassessment, patient showed a picture on her phone of herself with what appeared to be facial droop. When patient was asked when the picture was taken she said this morning at her doctor's office. Patient then reported a history of strokes. Patient's chief complaint upon arrival was that she had been sent from her PCP's office to be admitted for high heart rate. Patient did not mention facial droop/stroke symptoms in triage upon arrival. Patient did not appear to have any obvious facial droop or focal deficit upon arrival. ED provider notified.     Electronically signed by Emerita Deras RN at 02/03/23 3001     Marco Haro MD at 02/03/23 1545          Subjective   History of Present Illness  Patient is a 62-year-old female who was sent from her cardiology appointment to the ED secondary to tachycardia.  She states that about 3 weeks ago she started having syncopal episodes, states that she has had a total of 5 or 6 of these.  She states that she was seen here, got referred to cardiology and went there for that appointment today when she was once again referred to the ED.  While in the waiting room, approximately 1 hour ago, she developed weakness in the right side of her face, right arm, right leg, some vague numbness here but also some tingling in both hands and both feet.  No speech disturbance.  No visual disturbance.  No chest pain.  She has felt short of breath at times.  She denies other symptoms or other complaints.      Review of Systems   All other systems reviewed and are negative.      Past Medical History:   Diagnosis Date   • Shoulder fracture        Allergies   Allergen Reactions   • Vancomycin Rash       Past Surgical History:   Procedure Laterality Date   • BLADDER REPAIR     • CHOLECYSTECTOMY     • GASTRIC BYPASS     • HYSTERECTOMY     • LASIK     • LUMBAR DISC SURGERY      L5 fusion   • ORIF HUMERUS FRACTURE Right 8/30/2020    Procedure: RIGHT SHOULDER HUMERUS PROXIMAL OPEN REDUCTION  INTERNAL FIXATION WITH PLATE AND SCREW;  Surgeon: Eleazar Núñez MD;  Location: [facility];  Service: Orthopedics;  Laterality: Right;   • TOTAL SHOULDER REPLACEMENT      right        Family History   Problem Relation Age of Onset   • Stomach cancer Mother    • No Known Problems Father        Social History     Socioeconomic History   • Marital status:    Tobacco Use   • Smoking status: Never   • Smokeless tobacco: Never   Substance and Sexual Activity   • Alcohol use: No   • Drug use: No   • Sexual activity: Defer           Objective   Physical Exam  Vitals and nursing note reviewed.   Constitutional:       Appearance: Normal appearance. She is not toxic-appearing or diaphoretic.      Comments: Well-developed well-nourished female, alert and well-oriented, does not appear to be in acute distress.   HENT:      Head: Normocephalic and atraumatic.   Eyes:      General: No scleral icterus.     Pupils: Pupils are equal, round, and reactive to light.   Neck:      Trachea: No tracheal deviation.   Cardiovascular:      Rate and Rhythm: Normal rate and regular rhythm.   Pulmonary:      Effort: Pulmonary effort is normal. No respiratory distress.      Breath sounds: Normal breath sounds.   Chest:      Chest wall: No tenderness.   Abdominal:      General: Bowel sounds are normal.      Palpations: Abdomen is soft.      Tenderness: There is no abdominal tenderness. There is no guarding or rebound.   Musculoskeletal:         General: No tenderness. Normal range of motion.      Cervical back: Normal range of motion and neck supple. No rigidity or tenderness.      Right lower leg: No edema.      Left lower leg: No edema.   Skin:     General: Skin is warm and dry.      Capillary Refill: Capillary refill takes less than 2 seconds.      Coloration: Skin is not pale.   Neurological:      Mental Status: She is alert and oriented to person, place, and time.      GCS: GCS eye subscore is 4. GCS verbal subscore is 5. GCS motor  subscore is 6.      Motor: No abnormal muscle tone.      Comments: Perhaps some mild right facial droop.  Drift of the right arm, does not go to the bed.  Drift of the right leg, does go to the bed.  Mildly diminished light touch sensation on the right side.  Some ataxia.  NIH stroke scale is 5.   Psychiatric:         Mood and Affect: Mood normal.         Behavior: Behavior normal.         Procedures  EKG shows sinus tachycardia, rate 141.  WA interval 120, QRS duration 60, QTc 444 ms.  Baseline artifact.  No apparent acute ischemia.  No evidence for STEMI.        ED Course  ED Course as of 02/04/23 0058   Fri Feb 03, 2023   1827 Case discussed with stroke neurology Dr. Elena.  He will see the patient remotely when she returns from CT. [CM]   1859 Dr. Wilson has seen the patient, does not feel that she has a stroke.  He recommends to treat her tachycardia, hypertension and anxiety.  If symptoms persist however, he does recommend MRI. [CM]   1910 Case discussed with and care endorsed to Dr. Haro at shift change.  Electronically signed by Khurram Bauer MD, 02/03/23, 7:11 PM EST.   [CM]   1934 Patient reassessed, resting comfortably, heart rate in the 120s.  Endorses tingling of her bilateral toes and generalized fatigue and weakness mildly worse on the right. [KP]   2013 Discussed admission with hospitalist Dr. Alan.  Admission deferred until MRI complete. [KP]   2338 MRI Angiogram Head Without Contrast  1.  No flow-limiting stenosis or occlusion. No cerebral aneurysm.  2.  CT abnormality is not well evaluated on MRA. [KP]   Sat Feb 04, 2023   0022 MRI Angiogram Neck Without Contrast  No flow-limiting stenosis or occlusion. [KP]   0022 MRI Brain With & Without Contrast  1.  No acute infarct, hemorrhage, mass effect, or hydrocephalus.  2.  0.7 x 0.5 cm indeterminate T2/FLAIR hyperintense lesion along the anterior chrissy with associated susceptibility artifact and no appreciable enhancement corresponding to  abnormality seen on CT. Recommend follow-up study in 3-6 months to document  stability or change. Subsequent study can be done with MR IAC protocol with and without contrast with thin slices through the brainstem. []   0057 Case discussed with and admitted to hospitalist Dr. Alan. []      ED Course User Index  [CM] Khurram Bauer MD  [] Marco Haro MD                                           Medical Decision Making  Rapid resting heart rate: acute illness or injury  Stroke-like symptoms: acute illness or injury  Amount and/or Complexity of Data Reviewed  Labs: ordered.  Radiology: ordered. Decision-making details documented in ED Course.  ECG/medicine tests: ordered.      Risk  Prescription drug management.  Decision regarding hospitalization.          Final diagnoses:   Rapid resting heart rate   Stroke-like symptoms       ED Disposition  ED Disposition     ED Disposition   Decision to Admit    Condition   --    Comment   Level of Care: Telemetry [5]   Diagnosis: Syncope [014667]   Admitting Physician: JUSTINE ALAN [[id number]]   Attending Physician: JUSTINE ALAN [[id number]]               No follow-up provider specified.       Medication List      No changes were made to your prescriptions during this visit.          Marco Haro MD  02/04/23 0058      Electronically signed by Marco Haro MD at 02/04/23 0058     Enedina Lawton at 02/03/23 2238        Patient gone to CT at this time, will obtain labs upon arrival back to ED.    Electronically signed by Enedina Lawton at 02/03/23 2238         Facility-Administered Medications as of 2/6/2023   Medication Dose Route Frequency Provider Last Rate Last Admin   • amLODIPine (NORVASC) tablet 5 mg  5 mg Oral Q24H Monroe Pereira DO       • [COMPLETED] diphenhydrAMINE (BENADRYL) injection 25 mg  25 mg Intravenous Once Marco Haro MD   25 mg at 02/04/23 0046   • [COMPLETED] gadobenate dimeglumine (MULTIHANCE) injection 15  mL  15 mL Intravenous Once in imaging Marco Haro MD   15 mL at 02/03/23 2343   • heparin (porcine) 5000 UNIT/ML injection 5,000 Units  5,000 Units Subcutaneous Q12H Rodriguez Alan MD   5,000 Units at 02/05/23 2105   • losartan (COZAAR) tablet 50 mg  50 mg Oral Q24H Monroe Pereira DO       • Magnesium Sulfate - Total Dose 4 grams - Magnesium 1 or Less  4 g Intravenous PRN Rodriguez Alan MD        Or   • Magnesium Sulfate - Total Dose 3 grams - Magnesium 1.1 - 1.5  1 g Intravenous PRN Rodriguez Alan MD        Or   • Magnesium Sulfate - Total Dose 2 grams - Magnesium 1.6 - 1.9  2 g Intravenous PRN Rodriguez Alan MD       • [COMPLETED] Magnesium Sulfate - Total Dose 2 grams - Magnesium 1.6 - 1.9  2 g Intravenous Once Rodriguez Alan MD   Stopped at 02/04/23 1437   • [COMPLETED] magnesium sulfate 2g/50 mL (PREMIX) infusion  2 g Intravenous Once Monroe Pereira DO   Stopped at 02/04/23 1659   • [COMPLETED] metoprolol tartrate (LOPRESSOR) injection 5 mg  5 mg Intravenous Once Rodriguez Alan MD   5 mg at 02/04/23 0424   • metoprolol tartrate (LOPRESSOR) tablet 25 mg  25 mg Oral Q12H Sandra Savage PA-C   25 mg at 02/05/23 2105   • nitroglycerin (NITROSTAT) SL tablet 0.4 mg  0.4 mg Sublingual Q5 Min PRN Rodriguez Alan MD       • [COMPLETED] ondansetron (ZOFRAN) injection 4 mg  4 mg Intravenous Once Marco Haro MD   4 mg at 02/03/23 2250   • ondansetron ODT (ZOFRAN-ODT) disintegrating tablet 4 mg  4 mg Oral Q6H PRN Monroe Pereira DO   4 mg at 02/06/23 0829   • pantoprazole (PROTONIX) EC tablet 40 mg  40 mg Oral Q AM Rodriguez Alan MD   40 mg at 02/05/23 0514   • potassium chloride (K-DUR,KLOR-CON) CR tablet 40 mEq  40 mEq Oral PRN Rodriguez Alan MD        Or   • potassium chloride (KLOR-CON) packet 40 mEq  40 mEq Oral PRN Rodriguez Alan MD        Or   • potassium chloride 10 mEq in 100 mL IVPB  10 mEq Intravenous Q1H PRN  Rodriguez Alan MD       • [COMPLETED] potassium chloride (K-DUR,KLOR-CON) CR tablet 40 mEq  40 mEq Oral Q4H Monroe Pereira DO   40 mEq at 23 0951   • prochlorperazine (COMPAZINE) injection 2.5 mg  2.5 mg Intravenous Q6H PRN Rodriguez Alan MD   2.5 mg at 23 0557   • [COMPLETED] prochlorperazine (COMPAZINE) injection 5 mg  5 mg Intravenous Once Marco Haro MD   5 mg at 23 0047   • [COMPLETED] regadenoson (LEXISCAN) injection 0.4 mg  0.4 mg Intravenous Once in imaging Monroe Pereira DO   0.4 mg at 23 0904   • [COMPLETED] sodium chloride 0.9 % bolus 2,313 mL  30 mL/kg Intravenous Once hKurram Bauer MD   Stopped at 23 0211   • sodium chloride 0.9 % flush 10 mL  10 mL Intravenous PRN Rodriguez Alan MD       • sodium chloride 0.9 % flush 10 mL  10 mL Intravenous Q12H Rodriguez Alan MD   10 mL at 23 2106   • sodium chloride 0.9 % flush 10 mL  10 mL Intravenous PRN Rodriguez Alan MD       • sodium chloride 0.9 % infusion 40 mL  40 mL Intravenous PRN Rodriguez Alan MD       • [COMPLETED] technetium sestamibi (CARDIOLITE) injection 1 dose  1 dose Intravenous Once in imaging Monroe Pereira DO   1 dose at 23 0740   • [COMPLETED] technetium sestamibi (CARDIOLITE) injection 1 dose  1 dose Intravenous Once in imaging Monroe Pereira DO   1 dose at 23 0904        Physician Progress Notes (all)      Monroe Pereira DO at 23 1948              [facility]     Patient Identification:  Name:  Ana Laura Jones  Age:  62 y.o.  Sex:  female  :  1960  MRN:  [id number]  Visit Number:  [id number]  ROOM: [address] [place] [address]     Primary Care Provider:  Annita Adames APRN    Length of stay in inpatient status:  0    Subjective     Chief Compliant:    Chief Complaint   Patient presents with   • Rapid Heart Rate   • Shortness of Breath       History of Presenting Illness: Patient seen and  evaluated in follow-up for recurrent syncopal episodes at home x5.  Patient overnight with intermittent recurrent episodes of tachycardia but as well as with some pauses.  Seen by cardiology and after initially beta-blockade being reduced due to pauses last night reading creased back to previous nighttime dosing.  Patient does note some improvement with her dyspnea but still present.    Objective     Current [facility] Meds:  heparin (porcine), 5,000 Units, Subcutaneous, Q12H  [START ON 2/6/2023] losartan, 50 mg, Oral, Q24H  metoprolol tartrate, 25 mg, Oral, Q12H  pantoprazole, 40 mg, Oral, Q AM  sodium chloride, 10 mL, Intravenous, Q12H         ----------------------------------------------------------------------------------------------------------------------  Vital Signs:  Temp:  [98.2 °F (36.8 °C)-98.7 °F (37.1 °C)] 98.4 °F (36.9 °C)  Heart Rate:  [] 95  Resp:  [18] 18  BP: (137-182)/(68-94) 140/79  SpO2:  [95 %-99 %] 99 %  on   ;   Device (Oxygen Therapy): room air  Body mass index is 31.05 kg/m².      Intake/Output Summary (Last 24 hours) at 2/5/2023 1948  Last data filed at 2/5/2023 1300  Gross per 24 hour   Intake 720 ml   Output --   Net 720 ml      ----------------------------------------------------------------------------------------------------------------------  Physical exam:  Constitutional:  Well-developed and well-nourished.  No acute distress.      HENT:  Head:  Normocephalic and atraumatic.  Mouth:  Moist mucous membranes.    Eyes:  Conjunctivae and EOM are normal. No scleral icterus.    Cardiovascular: Tachycardic but regular rhythm and normal heart sounds with no murmur.  Pulmonary/Chest:  No respiratory distress, no wheezes, no crackles, with normal breath sounds and good air movement.  Abdominal:  Soft.  Bowel sounds are normal.  No distension and no tenderness.   Musculoskeletal:  No edema, no tenderness, and no deformity.  No red or swollen joints anywhere.  Functional ROM intact.    Neurological:  Alert and oriented to person, place, and time.  No cranial nerve deficit.  No tongue deviation.  No facial droop.  No slurred speech. Intact Sensation throughout  Skin:  Skin is warm and dry. No rash or lesion noted. No pallor.   Peripheral vascular:  Pulses in all 4 extremities with no clubbing, no cyanosis, no edema.  Psychiatric: Appropriate mood and affect, pleasant.   ----------------------------------------------------------------------------------------------------------------------  WBC/HGB/HCT/PLT   5.18/8.8/31.1/328 (02/05 0132)  BUN/CREAT/GLUC/ALT/AST/PRECIOUS/LIP    3/0.45/93/--/--/--/-- (02/05 0132)  LYTES - Na/K/Cl/CO2: 136/3.3*/108*/20.0* (02/05 0132)        No results found for: URINECX  Blood Culture   Date Value Ref Range Status   02/03/2023 No growth at 24 hours  Preliminary   02/03/2023 No growth at 24 hours  Preliminary       I have personally looked at the labs and they are summarized above.  ----------------------------------------------------------------------------------------------------------------------  Detailed radiology reports for the last 24 hours:  CT Abdomen Pelvis Without Contrast    Result Date: 2/4/2023  1. Thickening of the lower esophagus with adjacent fat stranding concerning for esophagitis. 2. Evidence of small bowel ileus. No bowel obstruction. Normal appendix. 3. Hepatic steatosis without focal liver mass. 4. Cholecystectomy, hysterectomy, and gastric bypass. Signer Name: Bi Herrera MD  Signed: 2/4/2023 1:31 AM  Workstation Name: [id number]  [facility]    MRI Angiogram Head Without Contrast    Result Date: 2/3/2023  1.  No flow-limiting stenosis or occlusion. No cerebral aneurysm. 2.  CT abnormality is not well evaluated on MRA. Signer Name: Feng Levi MD  Signed: 2/3/2023 11:26 PM  Workstation Name: [id number]  [facility]    MRI Angiogram Neck Without Contrast    Result Date: 2/4/2023  No flow-limiting  stenosis or occlusion. Signer Name: Feng Levi MD  Signed: 2/4/2023 12:15 AM  Workstation Name: [id number]  [facility]    MRI Brain With & Without Contrast    Result Date: 2/4/2023  1.  No acute infarct, hemorrhage, mass effect, or hydrocephalus. 2.  0.7 x 0.5 cm indeterminate T2/FLAIR hyperintense lesion along the anterior chrissy with associated susceptibility artifact and no appreciable enhancement corresponding to abnormality seen on CT. Recommend follow-up study in 3-6 months to document stability or change. Subsequent study can be done with MR IAC protocol with and without contrast with thin slices through the brainstem. Signer Name: Feng Levi MD  Signed: 2/4/2023 12:14 AM  Workstation Name: [id number]  [facility]    Assessment & Plan      Recurrent syncope at home  Supraventricular tachycardia    -Continue beta-blockade, initially reduced this morning due to pauses overnight but increased back to 25 mg by cardiology will closely monitor for recurrent pauses    -Transthoracic echocardiogram shows moderate pulmonary hypertension and grade 1 diastolic dysfunction otherwise no significant findings    -Cardiology consulted and following along recommended stress test, will order for the a.m.    -N.p.o. after midnight for stress test    Transient episode of right-sided weakness    -Status post MRI with no evidence of acute stroke but noted T2 flair pontine lesion that could be artifact or chronic infarct.    -Continue aspirin and statin.    Hypertension    -Continue losartan    Lactic acidemia, resolved  Esophagitis      Copied text in portions of the note has been reviewed and is accurate as of 02/05/23    VTE Prophylaxis:   Mechanical Order History:     None      Pharmalogical Order History:      Ordered     Dose Route Frequency Stop    02/04/23 0236  heparin (porcine) 5000 UNIT/ML injection 5,000 Units         5,000 Units SC Every 12 Hours Scheduled --                 Disposition Home once medically stable and improved    Monroe Pereira DO  [facility]  23  19:48 EST      Electronically signed by Monroe Pereira DO at 23     Justine Edward MD at 23               LOS: 0 days     Name: Ana Laura Jones  Age/Sex: 62 y.o. female  :  1960        PCP: Annita Adames APRN  REF: No Known Provider    Principal Problem:    Syncope      Reason for follow-up: syncope, reported SVT    Subjective       Subjective      Ana Laura Jones is a 61 yo female admitted to this facility overnight on  for syncope and reported SVT with cardiology consultation placed for further evaluation.  She was evaluated in consultation on 23 with echocardiogram and orthostatics recommended, as well as monitoring of electrolytes.     Mrs. Jones is evaluated resting comfortably in bed in room 323 on this AM. She is chest pain free but reports she continues to have palpitations with movement. Orthostatics were reviewed and modifications were made to her blood pressure regimen.          Vital Signs  Temp:  [98.1 °F (36.7 °C)-98.4 °F (36.9 °C)] 98.4 °F (36.9 °C)  Heart Rate:  [] 98  Resp:  [18-20] 18  BP: (133-171)/() 164/88  Vital Signs (last 72 hrs)        0700  / 0659  0700   0659  07 0659  07 0927   Most Recent      Temp (°F)   97.3 -  98.3    97.6 -  98.4       98.4 (36.9)  06    Heart Rate   77 -  145    90 -  119       98  06    Resp     18    18 -  20       18  06    BP   154/86 -  197/109    133/84 -  174/80       164/88  06    SpO2 (%)   95 -  99    95 -  99       95  06              Documented weights    23 1311 23 0230 23 0500 23 0501   Weight: 77.1 kg (170 lb) 85.5 kg (188 lb 9.6 oz) 85.5 kg (188 lb 9.6 oz) 84.6 kg (186 lb 9.6 oz)      Body mass index is 31.05 kg/m².    Intake/Output Summary (Last 24  hours) at 2/5/2023 0927  Last data filed at 2/5/2023 0501  Gross per 24 hour   Intake 240 ml   Output --   Net 240 ml     Objective    Objective       Physical Exam:     General Appearance:    Alert, cooperative, in no acute distress   Head:    Normocephalic, without obvious abnormality, atraumatic   Eyes:            Conjunctivae and sclerae normal, no   icterus, no pallor, corneas clear.   Neck:   No adenopathy, supple, trachea midline, no thyromegaly, no   carotid bruit, no JVD   Lungs:     Clear to auscultation,respirations regular, even and                  unlabored    Heart:    Regular rhythm and normal rate, normal S1 and S2, no            murmur, no gallop, no rub, no click   Chest Wall:    No abnormalities observed   Abdomen:     Normal bowel sounds, no masses, no organomegaly, soft        nontender, nondistended, no guarding, no rebound                tenderness   Extremities:   Moves all extremities well, no edema, no cyanosis, no             redness   Pulses:   Pulses palpable and equal bilaterally   Skin:   No bleeding, bruising or rash       Neurologic:             Procedures    Results review       Results Review:   Results from last 7 days   Lab Units 02/05/23  0132 02/04/23  0357 02/03/23  1425   WBC 10*3/mm3 5.18 5.63 6.56   HEMOGLOBIN g/dL 8.8* 8.9* 10.9*   PLATELETS 10*3/mm3 328 359 445     Results from last 7 days   Lab Units 02/05/23  0132 02/04/23  0357 02/03/23  1425   SODIUM mmol/L 136 136 141   POTASSIUM mmol/L 3.3* 4.0 3.7   CHLORIDE mmol/L 108* 105 106   CO2 mmol/L 20.0* 21.7* 18.5*   BUN mg/dL 3* 5* 6*   CREATININE mg/dL 0.45* 0.46* 0.52*   CALCIUM mg/dL 7.4* 7.5* 8.4*   GLUCOSE mg/dL 93 92 110*   ALT (SGPT) U/L  --  68* 82*   AST (SGOT) U/L  --  105* 120*     Results from last 7 days   Lab Units 02/03/23  1942 02/03/23  1425   TROPONIN T ng/mL <0.010 <0.010     Lab Results   Component Value Date    INR 0.93 02/03/2023    INR 0.95 01/17/2023    INR 0.96 11/13/2022    INR 0.93 09/11/2020     INR 0.95 08/30/2020     Lab Results   Component Value Date    MG 2.5 (H) 02/05/2023    MG 1.8 02/04/2023    MG 1.9 02/03/2023     Lab Results   Component Value Date    TSH 1.920 02/03/2023    TRIG 45 02/04/2023    HDL 63 (H) 02/04/2023     (H) 02/04/2023      Imaging Results (Last 48 Hours)     Procedure Component Value Units Date/Time    CT Abdomen Pelvis Without Contrast [[id number]] Collected: 02/04/23 0131     Updated: 02/04/23 0133    Narrative:      CT Abdomen Pelvis WO    INDICATION:   Abnormal elevated liver enzymes. Tachycardia.    TECHNIQUE:   CT of the abdomen and pelvis without IV contrast. Coronal and sagittal reconstructions were obtained.  Radiation dose reduction techniques included automated exposure control or exposure modulation based on body size. Count of known CT and cardiac nuc  med studies performed in previous 12 months: 5.     COMPARISON:   1/25/2023    FINDINGS:  There is chronic right base atelectasis due to a chronically elevated right hemidiaphragm. There is a hiatal hernia with thickening of the lower esophagus with adjacent fat stranding compatible with esophagitis. There is coronary artery disease. There is  atherosclerotic disease with no aortic aneurysm. Gallbladder is surgically absent. Patient is status post gastric bypass. Gas-filled small bowel loops suggesting ileus. No definite small bowel obstruction is seen. There are a few scattered colonic  diverticula, but there is no evidence of acute diverticulitis or acute colitis. The appendix is normal.    High density material in the urinary bladder is likely excreted MRI contrast from prior studies this evening. Uterus is surgically absent. There is diffuse hepatic steatosis. No liver mass is identified allowing for the lack of IV contrast. Left adrenal  nodule is unchanged in the short interval. Solid abdominal organs are otherwise normal. There is no adenopathy or free fluid. Patient is status post L5-S1 spinal fusion  with laminectomy. There is an old fracture at L2.      Impression:        1. Thickening of the lower esophagus with adjacent fat stranding concerning for esophagitis.  2. Evidence of small bowel ileus. No bowel obstruction. Normal appendix.  3. Hepatic steatosis without focal liver mass.  4. Cholecystectomy, hysterectomy, and gastric bypass.      Signer Name: Bi Herrera MD   Signed: 2/4/2023 1:31 AM   Workstation Name: [id number]    [facility]    MRI Angiogram Neck Without Contrast [[id number]] Collected: 02/04/23 0015     Updated: 02/04/23 0017    Narrative:      MRA Neck WO    INDICATION:    Right-sided weakness    TECHNIQUE:   Axial time-of-flight MRA of the neck with 3-dimensional reformats.  Evaluation for a significant carotid arterial stenosis is based on the NASCET criteria.    COMPARISON:    None available.    FINDINGS:  Normal flow-related signal within the cervical carotid arteries and vertebral arteries without flow-limiting stenosis or occlusion.      Impression:      No flow-limiting stenosis or occlusion.    Signer Name: Feng Levi MD   Signed: 2/4/2023 12:15 AM   Workstation Name: [email or web address]    [facility]    MRI Brain With & Without Contrast [[id number]] Collected: 02/04/23 0014     Updated: 02/04/23 0016    Narrative:      MRI Brain WO W    CLINICAL HISTORY: Ct abdnormality in left chrissy, right side weakness;Tachycardia, unspecified;Unspecified symptoms and signs involving the nervous system.    COMPARISON: CT brain same day.    TECHNIQUE: Multiplanar, multisequence images of the brain were obtained with and without intravenous contrast.    FINDINGS:    No acute infarct, intracranial hemorrhage, mass effect, hydrocephalus, or midline shift.     0.7 x 0.5 cm T2/FLAIR hyperintense lesion along the anterior surface of the left anterior chrissy with associated susceptibility artifact. No appreciable enhancement.    Scattered foci of FLAIR hyperintensity  in the cerebral white matter, nonspecific but likely representing chronic microvascular white matter changes.      Ventricles and cortical sulci are mildly prominent, in keeping with age-related volume loss.  Basal cisterns are clear. Flow voids are preserved in major intracranial vessels.     Paranasal sinuses and mastoid air cells are clear.      Impression:        1.  No acute infarct, hemorrhage, mass effect, or hydrocephalus.  2.  0.7 x 0.5 cm indeterminate T2/FLAIR hyperintense lesion along the anterior chrissy with associated susceptibility artifact and no appreciable enhancement corresponding to abnormality seen on CT. Recommend follow-up study in 3-6 months to document  stability or change. Subsequent study can be done with MR IAC protocol with and without contrast with thin slices through the brainstem.    Signer Name: Feng Levi MD   Signed: 2/4/2023 12:14 AM   Workstation Name: [id number]    [facility]    MRI Angiogram Head Without Contrast [[id number]] Collected: 02/03/23 2326     Updated: 02/03/23 2328    Narrative:      MRA Head WO    INDICATION:    Right-sided weakness, CT abnormality in the chrissy    TECHNIQUE:   Axial time-of-flight MRA of the head with 3-dimensional reformats.    COMPARISON:    CT brain 2/3/2023.    FINDINGS:    Normal flow related signal within the intracranial carotid arteries, middle cerebral arteries, anterior cerebral arteries, intradural vertebral arteries, basilar artery, or posterior cerebral artery without significant stenosis or occlusion. No evidence  of cerebral aneurysm.    CT abnormality is not well visualized on MRA.      Impression:      1.  No flow-limiting stenosis or occlusion. No cerebral aneurysm.  2.  CT abnormality is not well evaluated on MRA.    Signer Name: Feng Levi MD   Signed: 2/3/2023 11:26 PM   Workstation Name: [id number]    [facility]iology [facility]    CT Head Without Contrast Stroke Protocol [[id number]] Collected:  02/03/23 1840     Updated: 02/03/23 1842    Narrative:      HISTORY:   Neurologic deficit. Acute stroke protocol. Right-sided weakness. Time of scan 6:30 PM read time 6:35 PM    TECHNIQUE:   CT head without contrast. Radiation dose reduction techniques included automated exposure control or exposure modulation based on body size. Radiation audit for number of CT and nuclear cardiology exams performed in the last year 2    COMPARISON:   Head CT from 1/25/2023.    FINDINGS:   There is no displaced calvarial fracture. The visualized paranasal sinuses and mastoid air cells are clear. There is no evidence for acute intracranial hemorrhage or extra-axial fluid collection. The ventricles are normal in size and configuration for  age group. There is no Chiari I malformation. No acute cortical infarct is suspected. Gray-white junction is relatively well-maintained for age group. There is a focus of tissue attenuation anterior to the left side of the chrissy that is about 6 x 6 x 5 mm  dimension. Its not changed from the prior study. It could be exophytic from the chrissy and should be further evaluated with an MRI with and without contrast with attention to the posterior fossa.      Impression:      No acute intracranial abnormality is suspected, but if there is clinical concern for acute CVA, follow-up imaging is recommended.  2. There is a soft tissue lesion associated with the left anterior chrissy measuring about 6 x 6 x 5 mm dimension. It could be a parenchymal lesion exophytic from the chrissy or lesion adjacent to the chrissy. It should be further evaluated with an MRI with and  without contrast with attention to the brainstem if the patient is a candidate for MRI. This is not changed from the 1/25/2023 exam.    Signer Name: Ingris Harrison MD   Signed: 2/3/2023 6:40 PM   Workstation Name: [id number]    [facility]    XR Chest 1 View [[id number]] Collected: 02/03/23 1525     Updated: 02/03/23 1535    Narrative:       EXAM:    XR Chest, 1 View     EXAM DATE:    2/3/2023 2:55 PM     CLINICAL HISTORY:    sob     TECHNIQUE:    Frontal view of the chest.     COMPARISON:    01/25/2023     FINDINGS:    Lungs:  Unremarkable.  No consolidation.    Pleural space:  Unremarkable.  No pneumothorax.    Heart:  Unremarkable.  No cardiomegaly.    Mediastinum:  Unremarkable.    Bones/joints:  Stable appearance of the right humeral neck likely from  old fracture.    Soft tissues:  Surgical clips GE junction region are noted.       Impression:        Stable chest. No acute cardiopulmonary findings.     This report was finalized on 2/3/2023 3:25 PM by Dr. Marco Govea MD.           No results found for: BNP           ECG              Echo   Results for orders placed during the hospital encounter of 02/03/23    Adult Transthoracic Echo Complete w/ Color, Spectral and Contrast if necessary per protocol    Interpretation Summary  •  Left ventricular systolic function is normal. Left ventricular ejection fraction appears to be 61 - 65%.  •  Left ventricular diastolic function is consistent with (grade I) impaired relaxation.  •  Estimated right ventricular systolic pressure from tricuspid regurgitation is moderately elevated (45-55 mmHg).  •  Moderate pulmonary hypertension is present.          Telemetry:        SR in the 90s-100s       Medication Review:   heparin (porcine), 5,000 Units, Subcutaneous, Q12H  losartan, 25 mg, Oral, Q24H  metoprolol tartrate, 12.5 mg, Oral, Q12H  pantoprazole, 40 mg, Oral, Q AM  potassium chloride, 40 mEq, Oral, Q4H  sodium chloride, 10 mL, Intravenous, Q12H        sodium chloride, 125 mL/hr, Last Rate: 125 mL/hr (02/05/23 0601)        Assessment      Assessment:  Recurrent syncope  SVT        Plan     Recommendations:  #1 cardiac.  Patient with episodes of recurrent syncope over the past few weeks.  She has been experiencing some palpitations and runs of SVT with that.  We will go up on her beta-blockers.  Patient  blood pressure is running high and would recommend systolic pressure less than 139.  For stress test tomorrow.  Echocardiogram 2023 did not show any new wall motion abnormality, mild pulm hypertension    I discussed the patient's findings and my recommendations with patient and family          This document has been electronically signed by Sandra Savage PA-C  2023 09:27 EST      Part of this note may be an electronic transcription/translation of spoken language to printed text using the Dragon Dictation System.    Please note that portions of this note were completed with a voice recognition program.  .Electronically signed by Justine Edward MD, 23, 2:44 PM EST.      Electronically signed by Justine Edward MD at 23 1444     Monroe Pereira DO at 239              [facility] HOSPITALIST PROGRESS NOTE     Patient Identification:  Name:  Ana Laura Jones  Age:  62 y.o.  Sex:  female  :  1960  MRN:  [id number]  Visit Number:  [id number]  ROOM: [address] [place] [address]     Primary Care Provider:  Annita Adames APRN    Length of stay in inpatient status:  0    Subjective     Chief Compliant:    Chief Complaint   Patient presents with   • Rapid Heart Rate   • Shortness of Breath       History of Presenting Illness: Patient seen and evaluated in follow-up for recurrent syncopal episodes at home x5.  Patient today denying any acute complaints other than feeling somewhat lightheaded and previously had an episode of SVT in the 180s that lasted approximately 13 minutes before terminating on its own.    Objective     Current Hospital Meds:  heparin (porcine), 5,000 Units, Subcutaneous, Q12H  losartan, 25 mg, Oral, Q24H  metoprolol tartrate, 25 mg, Oral, Q12H  pantoprazole, 40 mg, Oral, Q AM  sodium chloride, 10 mL, Intravenous, Q12H      sodium chloride, 125 mL/hr, Last Rate: 125 mL/hr (23  1909)      ----------------------------------------------------------------------------------------------------------------------  Vital Signs:  Temp:  [97.6 °F (36.4 °C)-98.3 °F (36.8 °C)] 98.1 °F (36.7 °C)  Heart Rate:  [] 111  Resp:  [18-20] 20  BP: (133-192)/() 141/88  SpO2:  [95 %-99 %] 99 %  on   ;   Device (Oxygen Therapy): room air  Body mass index is 31.38 kg/m².      Intake/Output Summary (Last 24 hours) at 2/4/2023 2049  Last data filed at 2/4/2023 0211  Gross per 24 hour   Intake 2313 ml   Output --   Net 2313 ml      ----------------------------------------------------------------------------------------------------------------------  Physical exam:  Constitutional:  Well-developed and well-nourished.  No acute distress.      HENT:  Head:  Normocephalic and atraumatic.  Mouth:  Moist mucous membranes.    Eyes:  Conjunctivae and EOM are normal. No scleral icterus.    Cardiovascular: Tachycardic but regular rhythm and normal heart sounds with no murmur.  Pulmonary/Chest:  No respiratory distress, no wheezes, no crackles, with normal breath sounds and good air movement.  Abdominal:  Soft.  Bowel sounds are normal.  No distension and no tenderness.   Musculoskeletal:  No edema, no tenderness, and no deformity.  No red or swollen joints anywhere.  Functional ROM intact.   Neurological:  Alert and oriented to person, place, and time.  No cranial nerve deficit.  No tongue deviation.  No facial droop.  No slurred speech. Intact Sensation throughout  Skin:  Skin is warm and dry. No rash or lesion noted. No pallor.   Peripheral vascular:  Pulses in all 4 extremities with no clubbing, no cyanosis, no edema.  Psychiatric: Appropriate mood and affect, pleasant.   ----------------------------------------------------------------------------------------------------------------------  WBC/HGB/HCT/PLT   5.63/8.9/31.1/359 (02/04 0357)  BUN/CREAT/GLUC/ALT/AST/PRECIOUS/LIP    5/0.46/92/68/105/--/-- (02/04  0357)  LYTES - Na/K/Cl/CO2: 136/4.0/105/21.7* (02/04 0357)        No results found for: URINECX  Blood Culture   Date Value Ref Range Status   02/03/2023 No growth at 24 hours  Preliminary   02/03/2023 No growth at 24 hours  Preliminary       I have personally looked at the labs and they are summarized above.  ----------------------------------------------------------------------------------------------------------------------  Detailed radiology reports for the last 24 hours:  CT Abdomen Pelvis Without Contrast    Result Date: 2/4/2023  1. Thickening of the lower esophagus with adjacent fat stranding concerning for esophagitis. 2. Evidence of small bowel ileus. No bowel obstruction. Normal appendix. 3. Hepatic steatosis without focal liver mass. 4. Cholecystectomy, hysterectomy, and gastric bypass. Signer Name: Bi Herrera MD  Signed: 2/4/2023 1:31 AM  Workstation Name: [id number]  [facility]    MRI Angiogram Head Without Contrast    Result Date: 2/3/2023  1.  No flow-limiting stenosis or occlusion. No cerebral aneurysm. 2.  CT abnormality is not well evaluated on MRA. Signer Name: Feng Levi MD  Signed: 2/3/2023 11:26 PM  Workstation Name: [id number]  [facility]    MRI Angiogram Neck Without Contrast    Result Date: 2/4/2023  No flow-limiting stenosis or occlusion. Signer Name: Feng Levi MD  Signed: 2/4/2023 12:15 AM  Workstation Name: [id number]  [facility]    MRI Brain With & Without Contrast    Result Date: 2/4/2023  1.  No acute infarct, hemorrhage, mass effect, or hydrocephalus. 2.  0.7 x 0.5 cm indeterminate T2/FLAIR hyperintense lesion along the anterior chrissy with associated susceptibility artifact and no appreciable enhancement corresponding to abnormality seen on CT. Recommend follow-up study in 3-6 months to document stability or change. Subsequent study can be done with MR IAC protocol with and without contrast with thin  slices through the brainstem. Signer Name: Feng Levi MD  Signed: 2/4/2023 12:14 AM  Workstation Name: [id number]  [facility]    XR Chest 1 View    Result Date: 2/3/2023    Stable chest. No acute cardiopulmonary findings.  This report was finalized on 2/3/2023 3:25 PM by Dr. Marco Govea MD.      CT Head Without Contrast Stroke Protocol    Result Date: 2/3/2023  No acute intracranial abnormality is suspected, but if there is clinical concern for acute CVA, follow-up imaging is recommended. 2. There is a soft tissue lesion associated with the left anterior chrissy measuring about 6 x 6 x 5 mm dimension. It could be a parenchymal lesion exophytic from the chrissy or lesion adjacent to the chrissy. It should be further evaluated with an MRI with and without contrast with attention to the brainstem if the patient is a candidate for MRI. This is not changed from the 1/25/2023 exam. Signer Name: Ingris Harrison MD  Signed: 2/3/2023 6:40 PM  Workstation Name: [id number]  [facility]    Assessment & Plan      Recurrent syncope at home  Supraventricular tachycardia  Transient episode of right-sided weakness  Lactic acidemia, resolved  Esophagitis     Patient admitted earlier this morning with syncopal episodes at home, 5 in the last 3 weeks.  Patient continuously monitored on telemetry with episode of SVT occurring shortly after midday lasting approximately 30 minutes with peak heart rate in the 180s for terminating on its own before any occasions could be given or EKG obtained.  Patient reports feeling lightheaded and dizzy as similar to previous episodes at home prior to syncopal episodes.  Have consulted cardiology, appreciate their input.  Would suspect likely cardiogenic in nature of patient's syncope given timing and similarity of prior events to today's episode and similar events at home.  In regards to transient right-sided weakness neurology is seen and evaluated, reviewed MRI  findings and does not feel consistent with acute stroke but does note T2 flair pontine lesion that could be artifact or chronic infarct.  Patient does state that she had a prior pontine stroke in 2011.  Will initiate patient on aspirin as she reports not being on it since her stroke in 2011 despite this being part of clear guideline directed medical therapy.  We will also start patient on statin given LDL greater than 70 per neuro recommendations.  Echocardiogram reviewed and unremarkable other than moderate pulmonary hypertension and grade 1 diastolic dysfunction.  Increase metoprolol to 25 mg twice daily.  Patient also noted to have hypertension and started on low-dose losartan 25 mg.    Copied text in portions of the note has been reviewed and is accurate as of 02/04/23    VTE Prophylaxis:   Mechanical Order History:     None      Pharmalogical Order History:      Ordered     Dose Route Frequency Stop    02/04/23 0236  heparin (porcine) 5000 UNIT/ML injection 5,000 Units         5,000 Units SC Every 12 Hours Scheduled --                Disposition Home once medically stable and improved    Monroe Pereira DO  [facility] Hospitalist  02/04/23  20:49 EST      Electronically signed by Monroe Pereira DO at 02/04/23 2055          Consult Notes (all)      Justine Edward MD at 02/04/23 1357      Consult Orders    1. Inpatient Cardiology Consult [[id number]] ordered by Monroe Pereira DO at 02/04/23 1227               Date of Admit: 2/3/2023  Date of Consult: 02/04/23  Provider, No Known        Syncope      Assessment      1. Multiple syncopal episodes with reported SVT  2. Palpitations      Recommendations     #1 cardiac.  Patient with recent episode of recurrent UTIs requiring antibiotic therapy.  Patient has been getting dizzy lightheaded and palpitations.  He also had an episode of frequent syncopes.  Echocardiogram done on February 3 showed normal LV ejection fraction, mild pulm hypertension.  No  structural/valvular pathologies noted .  Orthostatics can be advised.   Patient to continue to be followed on monitor to rule out any arrhythmias as a cause of her syncope.  May need prolonged 30-day event monitor placed on discharge.  Thank you for the consultation and will follow along      Reason for consultation:    Keily Jones is a 62 y.o. female with PMH significant for:   · Gastric bypass surgery  · Cholecystectomy  · THC use  · Esophagitis    She presented to the emergency department of [facility] on February 3, 2023 for further evaluation of lightheadedness with reports of frequent syncopal episodes when standing 5 reported and the last 3 weeks at the time of admission.  She did undergo UA to ensure clearance of a recently diagnosed UTI and an ED visit as well as urine drug screen with CT and MRI brain performed in the emergency department with intermittent indeterminate left chrissy lesion with radiology recommending a follow-up MRI in 3 to 6 months.  There was also some concern for transient right-sided weakness.  On February 4, 2023 consultation was also placed with cardiology for SVT with 5 recent syncopal episodes.  She is chest pain-free at the time of evaluation but does report she has been having periodic episodes of palpitations accompanied by diaphoresis.      Cardiac risk factors:Sedentary life style and Obesity    Last Echo:  Results for orders placed during the hospital encounter of 02/03/23    Adult Transthoracic Echo Complete w/ Color, Spectral and Contrast if necessary per protocol    Interpretation Summary  •  Left ventricular systolic function is normal. Left ventricular ejection fraction appears to be 61 - 65%.  •  Left ventricular diastolic function is consistent with (grade I) impaired relaxation.  •  Estimated right ventricular systolic pressure from tricuspid regurgitation is moderately elevated (45-55 mmHg).  •  Moderate pulmonary  hypertension is present.      Last Stress:     Last Cath:      Past Medical History:   Diagnosis Date   • Shoulder fracture      Past Surgical History:   Procedure Laterality Date   • BLADDER REPAIR     • CHOLECYSTECTOMY     • GASTRIC BYPASS     • HYSTERECTOMY     • LASIK     • LUMBAR DISC SURGERY      L5 fusion   • ORIF HUMERUS FRACTURE Right 8/30/2020    Procedure: RIGHT SHOULDER HUMERUS PROXIMAL OPEN REDUCTION INTERNAL FIXATION WITH PLATE AND SCREW;  Surgeon: Eleazar Núñez MD;  Location: [facility];  Service: Orthopedics;  Laterality: Right;   • TOTAL SHOULDER REPLACEMENT      right      Family History   Problem Relation Age of Onset   • Stomach cancer Mother    • No Known Problems Father      Social History     Tobacco Use   • Smoking status: Never   • Smokeless tobacco: Never   Vaping Use   • Vaping Use: Never used   • Passive vaping exposure: Yes   Substance Use Topics   • Alcohol use: No   • Drug use: No     Medications Prior to Admission   Medication Sig Dispense Refill Last Dose   • melatonin 5 MG tablet tablet Take 10 mg by mouth At Night As Needed (sleep).   2/2/2023     Allergies:  Vancomycin    Review of Systems   Constitutional: Negative for activity change and chills.   HENT: Negative for congestion and drooling.    Eyes: Negative for pain and discharge.   Respiratory: Negative for cough, choking and shortness of breath.    Cardiovascular: Negative for chest pain and leg swelling.   Gastrointestinal: Negative for abdominal distention and blood in stool.   Endocrine: Negative for cold intolerance and heat intolerance.   Genitourinary: Negative for difficulty urinating and dysuria.   Musculoskeletal: Negative for arthralgias and gait problem.   Skin: Negative for color change and pallor.   Allergic/Immunologic: Negative for environmental allergies and food allergies.   Neurological: Positive for syncope. Negative for dizziness.   Hematological: Negative for adenopathy. Does not bruise/bleed easily.    Psychiatric/Behavioral: Negative for agitation and confusion.       Objective       Objective      Vital Signs  Temp:  [97.3 °F (36.3 °C)-98.3 °F (36.8 °C)] 97.6 °F (36.4 °C)  Heart Rate:  [] 119  Resp:  [18-20] 20  BP: (140-197)/() 170/90  Vital Signs (last 72 hrs)       02/01 0700  02/02 0659 02/02 0700  02/03 0659 02/03 0700  02/04 0659 02/04 0700  02/04 1357   Most Recent      Temp (°F)     97.3 -  98.3      97.6     97.6 (36.4) 02/04 0700    Heart Rate     77 -  145    97 -  119     119 02/04 1229    Resp       18      20     20 02/04 1100    BP     154/86 -  197/109    140/90 -  174/80     170/90 02/04 1234    SpO2 (%)     95 -  99      99     99 02/04 1100        Body mass index is 31.38 kg/m².  Documented weights    02/03/23 1311 02/04/23 0230 02/04/23 0500   Weight: 77.1 kg (170 lb) 85.5 kg (188 lb 9.6 oz) 85.5 kg (188 lb 9.6 oz)            Intake/Output Summary (Last 24 hours) at 2/4/2023 1357  Last data filed at 2/4/2023 0211  Gross per 24 hour   Intake 2313 ml   Output --   Net 2313 ml     Physical Exam        Results review     Results Review:    I reviewed the patient's new clinical results.  Results from last 7 days   Lab Units 02/03/23  1942 02/03/23  1425   TROPONIN T ng/mL <0.010 <0.010     Results from last 7 days   Lab Units 02/04/23  0357 02/03/23  1425   WBC 10*3/mm3 5.63 6.56   HEMOGLOBIN g/dL 8.9* 10.9*   PLATELETS 10*3/mm3 359 445     Results from last 7 days   Lab Units 02/04/23  0357 02/03/23  1425   SODIUM mmol/L 136 141   POTASSIUM mmol/L 4.0 3.7   CHLORIDE mmol/L 105 106   CO2 mmol/L 21.7* 18.5*   BUN mg/dL 5* 6*   CREATININE mg/dL 0.46* 0.52*   CALCIUM mg/dL 7.5* 8.4*   GLUCOSE mg/dL 92 110*   ALT (SGPT) U/L 68* 82*   AST (SGOT) U/L 105* 120*     Lab Results   Component Value Date    INR 0.93 02/03/2023    INR 0.95 01/17/2023    INR 0.96 11/13/2022    INR 0.93 09/11/2020    INR 0.95 08/30/2020     Lab Results   Component Value Date    MG 1.8 02/04/2023    MG 1.9  02/03/2023    MG 1.8 08/31/2020     Lab Results   Component Value Date    TSH 1.920 02/03/2023    TRIG 45 02/04/2023    HDL 63 (H) 02/04/2023     (H) 02/04/2023      Lab Results   Component Value Date    PROBNP <36.0 02/03/2023    PROBNP 189.8 01/25/2023    PROBNP <36.0 01/17/2023       ECG                         ECG/EMG Results (last 24 hours)     Procedure Component Value Units Date/Time    ECG 12 Lead Tachycardia [[id number]] Collected: 02/03/23 1310     Updated: 02/03/23 1610     QT Interval 290 ms      QTC Interval 444 ms     Narrative:      Test Reason : Tachycardia  Blood Pressure :   */*   mmHG  Vent. Rate : 141 BPM     Atrial Rate : 141 BPM     P-R Int : 120 ms          QRS Dur :  60 ms      QT Int : 290 ms       P-R-T Axes :  50  29  58 degrees     QTc Int : 444 ms    ** Critical Test Result: High HR  Sinus tachycardia  Otherwise normal ECG  When compared with ECG of 25-JAN-2023 18:22,  fusion complexes are no longer present  premature ventricular complexes are no longer present  Confirmed by Waqar Fletcher ([id number]) on 2/3/2023 4:09:17 PM    Referred By: WILLIAM           Confirmed By: Waqar Fletcher    ECG 12 Lead Rhythm Change [[id number]] Collected: 02/04/23 0405     Updated: 02/04/23 1020     QT Interval 306 ms      QTC Interval 463 ms     Narrative:      Test Reason : Rhythm Change  Blood Pressure :   */*   mmHG  Vent. Rate : 138 BPM     Atrial Rate : 138 BPM     P-R Int : 116 ms          QRS Dur :  64 ms      QT Int : 306 ms       P-R-T Axes :  69  57  60 degrees     QTc Int : 463 ms    Sinus tachycardia  Possible Left atrial enlargement  Borderline ECG  When compared with ECG of 03-FEB-2023 13:10,  No significant change was found  Confirmed by Waqar Fletcher ([id number]) on 2/4/2023 10:19:40 AM    Referred By:            Confirmed By: Waqar Fletcher    Adult Transthoracic Echo Complete w/ Color, Spectral and Contrast if necessary per protocol [[id number]] Resulted: 02/04/23 1136     Updated: 02/04/23 1139      Target HR (85%) 136 bpm      Max. Pred. HR (100%) 160 bpm      LVIDd 3.7 cm      LVIDs 2.46 cm      IVSd 0.95 cm      LVPWd 1.04 cm      FS 33.5 %      IVS/LVPW 0.92 cm      ESV(cubed) 14.9 ml      LV Sys Vol (BSA corrected) 11.4 cm2      EDV(cubed) 50.7 ml      LV South Vol (BSA corrected) 32.0 cm2      LVOT area 3.1 cm2      LV mass(C)d 111.4 grams      LVOT diam 2.00 cm      EDV(MOD-sp4) 61.6 ml      ESV(MOD-sp4) 21.9 ml      SV(MOD-sp4) 39.7 ml      SI(MOD-sp4) 20.6 ml/m2      EF(MOD-sp4) 64.4 %      MV E max gael 85.9 cm/sec      MV A max gael 116.0 cm/sec      MV E/A 0.74     Med Peak E' Gael 7.4 cm/sec      Lat Peak E' Gael 8.2 cm/sec      Avg E/e' ratio 11.01     TAPSE (>1.6) 2.32 cm      LA dimension (2D)  3.2 cm      Ao pk gael 157.0 cm/sec      Ao max PG 9.9 mmHg      Ao mean PG 5.0 mmHg      Ao V2 VTI 26.2 cm      TR max gael 303.0 cm/sec      TR max PG 36.7 mmHg      RVSP(TR) 46.7 mmHg      RAP systole 10.0 mmHg      PA acc time 0.11 sec      PA pr(Accel) 31.3 mmHg      Ao root diam 2.9 cm      ACS 1.80 cm      EF(MOD-bp) 64.0 %     Narrative:      •  Left ventricular systolic function is normal. Left ventricular ejection   fraction appears to be 61 - 65%.  •  Left ventricular diastolic function is consistent with (grade I)   impaired relaxation.  •  Estimated right ventricular systolic pressure from tricuspid   regurgitation is moderately elevated (45-55 mmHg).  •  Moderate pulmonary hypertension is present.      ECG 12 Lead Tachycardia [[id number]] Collected: 02/04/23 1241     Updated: 02/04/23 1243     QT Interval 370 ms      QTC Interval 495 ms     Narrative:      Test Reason : Tachycardia  Blood Pressure :   */*   mmHG  Vent. Rate : 108 BPM     Atrial Rate : 108 BPM     P-R Int : 124 ms          QRS Dur :  70 ms      QT Int : 370 ms       P-R-T Axes :  54  33  55 degrees     QTc Int : 495 ms    Sinus tachycardia with occasional premature ventricular complexes  Otherwise normal ECG  When compared with ECG  of 04-FEB-2023 04:05,  premature ventricular complexes are now present    Referred By: CIDNI           Confirmed By:           Imaging Results (Last 72 Hours)     Procedure Component Value Units Date/Time    CT Abdomen Pelvis Without Contrast [[id number]] Collected: 02/04/23 0131     Updated: 02/04/23 0133    Narrative:      CT Abdomen Pelvis WO    INDICATION:   Abnormal elevated liver enzymes. Tachycardia.    TECHNIQUE:   CT of the abdomen and pelvis without IV contrast. Coronal and sagittal reconstructions were obtained.  Radiation dose reduction techniques included automated exposure control or exposure modulation based on body size. Count of known CT and cardiac nuc  med studies performed in previous 12 months: 5.     COMPARISON:   1/25/2023    FINDINGS:  There is chronic right base atelectasis due to a chronically elevated right hemidiaphragm. There is a hiatal hernia with thickening of the lower esophagus with adjacent fat stranding compatible with esophagitis. There is coronary artery disease. There is  atherosclerotic disease with no aortic aneurysm. Gallbladder is surgically absent. Patient is status post gastric bypass. Gas-filled small bowel loops suggesting ileus. No definite small bowel obstruction is seen. There are a few scattered colonic  diverticula, but there is no evidence of acute diverticulitis or acute colitis. The appendix is normal.    High density material in the urinary bladder is likely excreted MRI contrast from prior studies this evening. Uterus is surgically absent. There is diffuse hepatic steatosis. No liver mass is identified allowing for the lack of IV contrast. Left adrenal  nodule is unchanged in the short interval. Solid abdominal organs are otherwise normal. There is no adenopathy or free fluid. Patient is status post L5-S1 spinal fusion with laminectomy. There is an old fracture at L2.      Impression:        1. Thickening of the lower esophagus with adjacent fat stranding  concerning for esophagitis.  2. Evidence of small bowel ileus. No bowel obstruction. Normal appendix.  3. Hepatic steatosis without focal liver mass.  4. Cholecystectomy, hysterectomy, and gastric bypass.      Signer Name: Bi Herrera MD   Signed: 2/4/2023 1:31 AM   Workstation Name: [id number]    [facility]    MRI Angiogram Neck Without Contrast [[id number]] Collected: 02/04/23 0015     Updated: 02/04/23 0017    Narrative:      MRA Neck WO    INDICATION:    Right-sided weakness    TECHNIQUE:   Axial time-of-flight MRA of the neck with 3-dimensional reformats.  Evaluation for a significant carotid arterial stenosis is based on the NASCET criteria.    COMPARISON:    None available.    FINDINGS:  Normal flow-related signal within the cervical carotid arteries and vertebral arteries without flow-limiting stenosis or occlusion.      Impression:      No flow-limiting stenosis or occlusion.    Signer Name: Feng Levi MD   Signed: 2/4/2023 12:15 AM   Workstation Name: [id number]    [facility]    MRI Brain With & Without Contrast [[id number]] Collected: 02/04/23 0014     Updated: 02/04/23 0016    Narrative:      MRI Brain WO W    CLINICAL HISTORY: Ct abdnormality in left chrissy, right side weakness;Tachycardia, unspecified;Unspecified symptoms and signs involving the nervous system.    COMPARISON: CT brain same day.    TECHNIQUE: Multiplanar, multisequence images of the brain were obtained with and without intravenous contrast.    FINDINGS:    No acute infarct, intracranial hemorrhage, mass effect, hydrocephalus, or midline shift.     0.7 x 0.5 cm T2/FLAIR hyperintense lesion along the anterior surface of the left anterior chrissy with associated susceptibility artifact. No appreciable enhancement.    Scattered foci of FLAIR hyperintensity in the cerebral white matter, nonspecific but likely representing chronic microvascular white matter changes.      Ventricles and cortical  sulci are mildly prominent, in keeping with age-related volume loss.  Basal cisterns are clear. Flow voids are preserved in major intracranial vessels.     Paranasal sinuses and mastoid air cells are clear.      Impression:        1.  No acute infarct, hemorrhage, mass effect, or hydrocephalus.  2.  0.7 x 0.5 cm indeterminate T2/FLAIR hyperintense lesion along the anterior chrissy with associated susceptibility artifact and no appreciable enhancement corresponding to abnormality seen on CT. Recommend follow-up study in 3-6 months to document  stability or change. Subsequent study can be done with MR IAC protocol with and without contrast with thin slices through the brainstem.    Signer Name: Feng Leiv MD   Signed: 2/4/2023 12:14 AM   Workstation Name: [id number]    [facility]    MRI Angiogram Head Without Contrast [[id number]] Collected: 02/03/23 2326     Updated: 02/03/23 2328    Narrative:      MRA Head WO    INDICATION:    Right-sided weakness, CT abnormality in the chrissy    TECHNIQUE:   Axial time-of-flight MRA of the head with 3-dimensional reformats.    COMPARISON:    CT brain 2/3/2023.    FINDINGS:    Normal flow related signal within the intracranial carotid arteries, middle cerebral arteries, anterior cerebral arteries, intradural vertebral arteries, basilar artery, or posterior cerebral artery without significant stenosis or occlusion. No evidence  of cerebral aneurysm.    CT abnormality is not well visualized on MRA.      Impression:      1.  No flow-limiting stenosis or occlusion. No cerebral aneurysm.  2.  CT abnormality is not well evaluated on MRA.    Signer Name: Feng Levi MD   Signed: 2/3/2023 11:26 PM   Workstation Name: [id number]    [facility]    CT Head Without Contrast Stroke Protocol [[id number]] Collected: 02/03/23 1840     Updated: 02/03/23 1842    Narrative:      HISTORY:   Neurologic deficit. Acute stroke protocol. Right-sided weakness. Time of  scan 6:30 PM read time 6:35 PM    TECHNIQUE:   CT head without contrast. Radiation dose reduction techniques included automated exposure control or exposure modulation based on body size. Radiation audit for number of CT and nuclear cardiology exams performed in the last year 2    COMPARISON:   Head CT from 1/25/2023.    FINDINGS:   There is no displaced calvarial fracture. The visualized paranasal sinuses and mastoid air cells are clear. There is no evidence for acute intracranial hemorrhage or extra-axial fluid collection. The ventricles are normal in size and configuration for  age group. There is no Chiari I malformation. No acute cortical infarct is suspected. Gray-white junction is relatively well-maintained for age group. There is a focus of tissue attenuation anterior to the left side of the chrissy that is about 6 x 6 x 5 mm  dimension. Its not changed from the prior study. It could be exophytic from the chrissy and should be further evaluated with an MRI with and without contrast with attention to the posterior fossa.      Impression:      No acute intracranial abnormality is suspected, but if there is clinical concern for acute CVA, follow-up imaging is recommended.  2. There is a soft tissue lesion associated with the left anterior chrissy measuring about 6 x 6 x 5 mm dimension. It could be a parenchymal lesion exophytic from the chrissy or lesion adjacent to the chrissy. It should be further evaluated with an MRI with and  without contrast with attention to the brainstem if the patient is a candidate for MRI. This is not changed from the 1/25/2023 exam.    Signer Name: Ingris Harrison MD   Signed: 2/3/2023 6:40 PM   Workstation Name: [email or web address]    [facility] [place]    XR Chest 1 View [[id number]] Collected: 02/03/23 1525     Updated: 02/03/23 1535    Narrative:      EXAM:    XR Chest, 1 View     EXAM DATE:    2/3/2023 2:55 PM     CLINICAL HISTORY:    sob     TECHNIQUE:    Frontal view of the chest.      COMPARISON:    01/25/2023     FINDINGS:    Lungs:  Unremarkable.  No consolidation.    Pleural space:  Unremarkable.  No pneumothorax.    Heart:  Unremarkable.  No cardiomegaly.    Mediastinum:  Unremarkable.    Bones/joints:  Stable appearance of the right humeral neck likely from  old fracture.    Soft tissues:  Surgical clips GE junction region are noted.       Impression:        Stable chest. No acute cardiopulmonary findings.     This report was finalized on 2/3/2023 3:25 PM by Dr. Marco Govea MD.             Tele: ST with freq PVCs and some SVT        I have discussed my impression and recommendations with the patient and family.    Thank you very much for asking us to be involved in this patient's care.  We will follow along with you.        This document has been electronically signed by Sandra Savage PA-C  February 4, 2023 13:57 EST      Part of this note may be an electronic transcription/translation of spoken language to printed text using the Dragon Dictation System.  Please note that portions of this note were completed with a voice recognition program.  .Electronically signed by Justine Edward MD, 02/04/23, 2:50 PM EST.              Electronically signed by Justine Edward MD at 02/04/23 1450     Christopher Norton MD at 02/04/23 0745          Teleneurology Stroke Follow up/Progress Note    Date of follow up: 2/4/2023  Attending Physician: Monroe Pereira DO  Current Hospital day: Hospital Day: 2  Location: Floor  Date/Time Telestroke doctor on video: 2/4/2023  1139 ET    Impression:   63 y/o F who presents with multiple episodes of syncope in the last few weeks, with UTI and was THC positive on UDS, was put on antibiotics and told to stop taking gummies. She presented today and while in ED had transient episode of R arm weakness, which has resolved. She has been tachycardic. MRI brain did not show acute ischemic stroke however shows T2 flair pontine lesion that could be artifact or chronic  infarct. At this time her exam is at baseline, NIH 0, no neuro deficits. She states she had a pontine stroke in 2011.    D/dx: Transient neuro deficits, resolved, unclear etiology 2/2 anxiety vs orthostasis vs syncope vs less likely TIA    Recommendation:   -F/u MRI brain without holli in 3-6 months  -She has not been on aspirin since her stroke in 2011, no history of GI bleed. She should take ASA 81mg daily for secondary stroke prevention  - F/u 2D echo for syncope workup  -F/u PCP and neurology as outpatient  -Check LDL, if LDL > 70, start atorvastatin 40mg for secondary stroke preventoin      Christopher Norton MD  We will sign off.  If you have any questions about the patient recommendations, please call [phone] at anytime and ask to speak with the neurologist on call. Press 1 for an emergent consult and 2 for a non-emergent consult.    Interval History:   Patient's exam is at her baseline. No neuro deficits. No new complaints.    Medication:   Current Facility-Administered Medications   Medication Dose Route Frequency Provider Last Rate Last Admin   • heparin (porcine) 5000 UNIT/ML injection 5,000 Units  5,000 Units Subcutaneous Q12H Rodriguez Alan MD       • Magnesium Sulfate - Total Dose 4 grams - Magnesium 1 or Less  4 g Intravenous PRN Rodriguez Alan MD        Or   • Magnesium Sulfate - Total Dose 3 grams - Magnesium 1.1 - 1.5  1 g Intravenous PRN Rodriguez Alan MD        Or   • Magnesium Sulfate - Total Dose 2 grams - Magnesium 1.6 - 1.9  2 g Intravenous PRN Rodriguez Alan MD       • Magnesium Sulfate - Total Dose 2 grams - Magnesium 1.6 - 1.9  2 g Intravenous Once Rodriguez Alan MD       • metoprolol tartrate (LOPRESSOR) tablet 12.5 mg  12.5 mg Oral Q12H Rodriguez Alan MD   12.5 mg at 02/04/23 0412   • nitroglycerin (NITROSTAT) SL tablet 0.4 mg  0.4 mg Sublingual Q5 Min PRN Rodriugez Alan MD       • pantoprazole (PROTONIX) EC tablet 40 mg  40 mg Oral Q  AM Rodriguez Alan MD   40 mg at 02/04/23 0413   • prochlorperazine (COMPAZINE) injection 2.5 mg  2.5 mg Intravenous Q6H PRN Rodriguez Alan MD   2.5 mg at 02/04/23 0424   • sodium chloride 0.9 % flush 10 mL  10 mL Intravenous PRN Rodriguez Alan MD       • sodium chloride 0.9 % flush 10 mL  10 mL Intravenous Q12H Rodriguez Alan MD       • sodium chloride 0.9 % flush 10 mL  10 mL Intravenous PRN Rodriguez Alan MD       • sodium chloride 0.9 % infusion 40 mL  40 mL Intravenous PRN Rodriguez Alan MD       • sodium chloride 0.9 % infusion  125 mL/hr Intravenous Continuous Rodriguez Alan  mL/hr at 02/04/23 0723 125 mL/hr at 02/04/23 0723       NIH stroke scale:        1a Level of consciousness 0  1b LOC questions 0  1c LOC commands 0  2 Best Gaze 0  3 Visual 0  4 Facial Palsy 0  5a Motor left arm 0  5b Motor right arm 0  6a Motor left leg 0  6b Motor right leg 0  7 Limb ataxia 0  8 Sensory 0  9 Best language 0  10 Dysarthria 0  11 Extinction and inattention 0   Total 0    Results:  Lab Results   Component Value Date    CREATININE 0.46 (L) 02/04/2023     Lab Results   Component Value Date    WBC 5.63 02/04/2023     No components found for: INR;3,  PROTIME   Lab Results   Component Value Date     (H) 02/04/2023      Lab Results   Component Value Date    HGBA1C 4.60 (L) 02/03/2023      CT Abdomen Pelvis Without Contrast    Result Date: 2/4/2023  Narrative: CT Abdomen Pelvis WO INDICATION: Abnormal elevated liver enzymes. Tachycardia. TECHNIQUE: CT of the abdomen and pelvis without IV contrast. Coronal and sagittal reconstructions were obtained.  Radiation dose reduction techniques included automated exposure control or exposure modulation based on body size. Count of known CT and cardiac nuc med studies performed in previous 12 months: 5. COMPARISON: 1/25/2023 FINDINGS: There is chronic right base atelectasis due to a chronically elevated right  hemidiaphragm. There is a hiatal hernia with thickening of the lower esophagus with adjacent fat stranding compatible with esophagitis. There is coronary artery disease. There is atherosclerotic disease with no aortic aneurysm. Gallbladder is surgically absent. Patient is status post gastric bypass. Gas-filled small bowel loops suggesting ileus. No definite small bowel obstruction is seen. There are a few scattered colonic diverticula, but there is no evidence of acute diverticulitis or acute colitis. The appendix is normal. High density material in the urinary bladder is likely excreted MRI contrast from prior studies this evening. Uterus is surgically absent. There is diffuse hepatic steatosis. No liver mass is identified allowing for the lack of IV contrast. Left adrenal nodule is unchanged in the short interval. Solid abdominal organs are otherwise normal. There is no adenopathy or free fluid. Patient is status post L5-S1 spinal fusion with laminectomy. There is an old fracture at L2.     Impression: 1. Thickening of the lower esophagus with adjacent fat stranding concerning for esophagitis. 2. Evidence of small bowel ileus. No bowel obstruction. Normal appendix. 3. Hepatic steatosis without focal liver mass. 4. Cholecystectomy, hysterectomy, and gastric bypass. Signer Name: Bi Herrera MD  Signed: 2/4/2023 1:31 AM  Workstation Name: [id number]  [facility]    CT Head Without Contrast    Result Date: 1/25/2023  Narrative: CT Head WO HISTORY: Weakness TECHNIQUE: Routine noncontrast head CT. Radiation dose reduction techniques included automated exposure control or exposure modulation based on body size. Radiation audit for CT and nuclear cardiology exams in the last 12 months: 2. COMPARISON: None. FINDINGS: No acute intracranial hemorrhage, mass lesion, or abnormal extra-axial fluid collection. No midline shift or focal mass effect. Ventricular system is normal in size and  configuration. . The gray-white matter differentiation is preserved. Visualized paranasal sinuses are clear. Visualized mastoid air cells are clear. No acute osseous abnormality.     Impression: 1.  No acute intracranial abnormality. Signer Name: VERA FLOYD MD  Signed: 1/25/2023 8:28 PM  Workstation Name: [email or web address]  [facility]    CT Head Without Contrast    Result Date: 1/17/2023  Narrative: EXAM:   CT Head Without Intravenous Contrast  EXAM DATE:   1/17/2023 1:24 PM  CLINICAL HISTORY:   Syncope/presyncope, cerebrovascular cause suspected  TECHNIQUE:   Axial computed tomography images of the head/brain without intravenous contrast.  Sagittal and coronal reformatted images were created and reviewed.  This CT exam was performed using one or more of the following dose reduction techniques:  automated exposure control, adjustment of the mA and/or kV according to patient size, and/or use of iterative reconstruction technique.  COMPARISON:   No relevant prior studies available.  FINDINGS:   BRAIN:  Unremarkable.  No hemorrhage.  No significant white matter disease.  No edema.   VENTRICLES:  Unremarkable.  No ventriculomegaly.   BONES/JOINTS:  Unremarkable.  No acute fracture.   SOFT TISSUES:  Unremarkable.   SINUSES:  Unremarkable as visualized.  No acute sinusitis.   MASTOID AIR CELLS:  Unremarkable as visualized.  No mastoid effusion.      Impression:   Unremarkable exam demonstrating no CT evidence of acute intracranial findings.  This report was finalized on 1/17/2023 2:13 PM by Dr. Daniel Baptiste MD.      MRI Angiogram Head Without Contrast    Result Date: 2/3/2023  Narrative: MRA Head WO INDICATION:  Right-sided weakness, CT abnormality in the chrissy TECHNIQUE: Axial time-of-flight MRA of the head with 3-dimensional reformats. COMPARISON:  CT brain 2/3/2023. FINDINGS: Normal flow related signal within the intracranial carotid arteries, middle cerebral arteries, anterior cerebral arteries,  intradural vertebral arteries, basilar artery, or posterior cerebral artery without significant stenosis or occlusion. No evidence of cerebral aneurysm. CT abnormality is not well visualized on MRA.     Impression: 1.  No flow-limiting stenosis or occlusion. No cerebral aneurysm. 2.  CT abnormality is not well evaluated on MRA. Signer Name: Feng Levi MD  Signed: 2/3/2023 11:26 PM  Workstation Name: [email or web address] [id number]  [facility]    MRI Angiogram Neck Without Contrast    Result Date: 2/4/2023  Narrative: MRA Neck WO INDICATION:  Right-sided weakness TECHNIQUE: Axial time-of-flight MRA of the neck with 3-dimensional reformats.  Evaluation for a significant carotid arterial stenosis is based on the NASCET criteria. COMPARISON:  None available. FINDINGS: Normal flow-related signal within the cervical carotid arteries and vertebral arteries without flow-limiting stenosis or occlusion.     Impression: No flow-limiting stenosis or occlusion. Signer Name: Feng Levi MD  Signed: 2/4/2023 12:15 AM  Workstation Name: [email or web address] [id number]  Radiology [facility]    MRI Brain With & Without Contrast    Result Date: 2/4/2023  Narrative: MRI Brain WO W CLINICAL HISTORY: Ct abdnormality in left chrissy, right side weakness;Tachycardia, unspecified;Unspecified symptoms and signs involving the nervous system. COMPARISON: CT brain same day. TECHNIQUE: Multiplanar, multisequence images of the brain were obtained with and without intravenous contrast. FINDINGS: No acute infarct, intracranial hemorrhage, mass effect, hydrocephalus, or midline shift. 0.7 x 0.5 cm T2/FLAIR hyperintense lesion along the anterior surface of the left anterior chrissy with associated susceptibility artifact. No appreciable enhancement. Scattered foci of FLAIR hyperintensity in the cerebral white matter, nonspecific but likely representing chronic microvascular white matter changes.  Ventricles and cortical sulci are mildly prominent, in keeping  with age-related volume loss.  Basal cisterns are clear. Flow voids are preserved in major intracranial vessels. Paranasal sinuses and mastoid air cells are clear.     Impression: 1.  No acute infarct, hemorrhage, mass effect, or hydrocephalus. 2.  0.7 x 0.5 cm indeterminate T2/FLAIR hyperintense lesion along the anterior chrissy with associated susceptibility artifact and no appreciable enhancement corresponding to abnormality seen on CT. Recommend follow-up study in 3-6 months to document stability or change. Subsequent study can be done with MR IAC protocol with and without contrast with thin slices through the brainstem. Signer Name: Feng Levi MD  Signed: 2/4/2023 12:14 AM  Workstation Name: [id number]  [facility] [place]    CT Abdomen Pelvis With Contrast    Result Date: 1/25/2023  Narrative: CT ABDOMEN AND PELVIS WITH CONTRAST, 1/25/2023 HISTORY: 62-year-old female in the ED with urinary tract infection and tachycardia. Clinical concern for pyelonephritis. TECHNIQUE: CT imaging of the abdomen and pelvis with IV contrast. Radiation dose reduction techniques included automated exposure control. Radiation audit for CT and nuclear cardiology exams in the last 12 months: 3. COMPARISON: *  CT abdomen/pelvis, 11/13/2022. ABDOMEN FINDINGS: Both kidneys, both ureters and the urinary bladder are normal in appearance. No evidence of urinary obstruction or renal inflammation. Cholecystectomy. No bile duct dilatation. Mild hepatomegaly and moderate diffuse hepatic steatosis. Pancreas and spleen are within normal limits. Previous gastric bypass surgery with Tin-en-Y gastrojejunostomy. No gastric distention. Mild distal esophageal dilatation. Small bowel and colon are otherwise unremarkable. Normal appendix. Normal caliber abdominal aorta. Small indeterminate circumscribed left adrenal nodule is unchanged. Correlate for any results of previously recommended imaging assessment. PELVIS FINDINGS: Hysterectomy.  Urinary bladder and rectum are within normal limits. No free pelvic fluid. No inguinal hernia. Lung base images show no active disease. Elevated right hemidiaphragm with bandlike right lung base scarring. Spine images show previous L5-S1 disc fusion surgery and mild chronic compression fracture deformity at L2.     Impression: 1.  No acute abnormality within the abdomen or pelvis. 2.  No CT evidence of pyelonephritis or upper urinary tract obstruction. 3.  Cholecystectomy. Mild hepatomegaly. Moderate diffuse hepatic steatosis. 4.  Gastric bypass surgery. Mild distal esophageal dilatation. 5.  Normal appendix. 6.  Hysterectomy. 7.  Small indeterminate left adrenal nodule, unchanged. Signer Name: Israel Goff MD  Signed: 1/25/2023 10:06 PM  Workstation Name: [email or web address]  [facility]    XR Chest 1 View    Result Date: 2/3/2023  Narrative: EXAM:   XR Chest, 1 View  EXAM DATE:   2/3/2023 2:55 PM  CLINICAL HISTORY:   sob  TECHNIQUE:   Frontal view of the chest.  COMPARISON:   01/25/2023  FINDINGS:   Lungs:  Unremarkable.  No consolidation.   Pleural space:  Unremarkable.  No pneumothorax.   Heart:  Unremarkable.  No cardiomegaly.   Mediastinum:  Unremarkable.   Bones/joints:  Stable appearance of the right humeral neck likely from old fracture.   Soft tissues:  Surgical clips GE junction region are noted.      Impression:   Stable chest. No acute cardiopulmonary findings.  This report was finalized on 2/3/2023 3:25 PM by Dr. Marco Govea MD.      XR Chest 1 View    Result Date: 1/25/2023  Narrative: CR Chest 1 Vw INDICATION: Weakness COMPARISON:  1/17/2023 FINDINGS: Portable AP view(s) of the chest. Mild elevation of the right hemidiaphragm. The heart and mediastinal contours are normal. The lungs are free of acute infiltrates. No pneumothorax or pleural effusion.     Impression: No acute cardiopulmonary findings. No significant interval change from prior study. Signer Name: Nicanor Woods,  MD  Signed: 1/25/2023 7:32 PM  Workstation Name: [id number]  [facility]    XR Chest 1 View    Result Date: 1/17/2023  Narrative: EXAM:   XR Chest, 1 View  EXAM DATE:   1/17/2023 2:13 PM  CLINICAL HISTORY:   CHF/COPD Protocol  TECHNIQUE:   Frontal view of the chest.  COMPARISON:   No relevant prior studies available.  FINDINGS:   LUNGS:  Unremarkable.  No consolidation.   PLEURAL SPACE:  Unremarkable.  No pneumothorax.   HEART:  Unremarkable.  No cardiomegaly.   MEDIASTINUM:  Unremarkable.   BONES/JOINTS:  Unremarkable.      Impression:   Unremarkable exam. No acute cardiopulmonary findings identified.  This report was finalized on 1/17/2023 2:41 PM by Dr. Daniel Baptiste MD.      CT Head Without Contrast Stroke Protocol    Result Date: 2/3/2023  Narrative: HISTORY: Neurologic deficit. Acute stroke protocol. Right-sided weakness. Time of scan 6:30 PM read time 6:35 PM TECHNIQUE: CT head without contrast. Radiation dose reduction techniques included automated exposure control or exposure modulation based on body size. Radiation audit for number of CT and nuclear cardiology exams performed in the last year 2 COMPARISON: Head CT from 1/25/2023. FINDINGS: There is no displaced calvarial fracture. The visualized paranasal sinuses and mastoid air cells are clear. There is no evidence for acute intracranial hemorrhage or extra-axial fluid collection. The ventricles are normal in size and configuration for age group. There is no Chiari I malformation. No acute cortical infarct is suspected. Gray-white junction is relatively well-maintained for age group. There is a focus of tissue attenuation anterior to the left side of the chrissy that is about 6 x 6 x 5 mm dimension. Its not changed from the prior study. It could be exophytic from the chrissy and should be further evaluated with an MRI with and without contrast with attention to the posterior fossa.     Impression: No acute intracranial abnormality is  suspected, but if there is clinical concern for acute CVA, follow-up imaging is recommended. 2. There is a soft tissue lesion associated with the left anterior chrissy measuring about 6 x 6 x 5 mm dimension. It could be a parenchymal lesion exophytic from the chrissy or lesion adjacent to the chrissy. It should be further evaluated with an MRI with and without contrast with attention to the brainstem if the patient is a candidate for MRI. This is not changed from the 1/25/2023 exam. Signer Name: Ingris Harrison MD  Signed: 2/3/2023 6:40 PM  Workstation Name: [id number]  [facility]        Teleneurology Patient Verification & Consent    I have proceeded with this evaluation at the direct request of the referring practitioner as there is felt to be no appropriate specialist available at bedside to perform these evaluations. The [facility] ([facility]) practitioner has reported to me this is the correct patient and has obtained verbal consent from the patient/surrogate to perform his voluntary telemedicine evaluation (including obtaining history, performing examination and reviewing data provided by the patient and/or originating [facility] care provider). I attest that I introduced myself to the patient, provided my credentials, disclosed my location, and determined that, based on review of the patient's chart and discussion with the patient's primary team, telemedicine via a HIPAA-compliant, real-time, face-to-face, two-way, interactive audio and video platform is an appropriate and effective means of providing this service.  The patient/surrogate has the right to refuse this evaluation as they have been explained risks (including potential loss of confidentiality), benefits, alternatives, and the potential need for subsequent face to face care. Patient/surrogate understands that there is a risk of medical inaccuracies given that our recommendations will be made based on reported data (and  we must therefore assume this information is accurate). Knowing that there is a risk that this information is not reported accurately, and that the telemedicine video, audio, or data feed may be incomplete, the patient agrees to proceed with evaluation and holds us harmless knowing these risks. In this evaluation, we will be providing recommendations only. The ultimate decision to follow, or not follow, these recommendations will be left to the bedside treating/requesting practitioner. The patient/surrogate has been notified that other healthcare professionals (including technical personnel) may be involved in this audio-video evaluation. All laws concerning confidentiality and patient access to medical records and copies of medical records apply to telemedicine. The patient/surrogate has received the originating site's Health Notice of Privacy Practices.    Medical Data Reviewed:   1. Data reviewed include clinical labs, radiology, medical test;  2. Obtaining/reviewing old medical records;  3. Obtaining case history from another source;  4. Independent review of CNS images    Christopher ZHENG MD, saw patient on 2023 for a followup in-patient or emergency room telemedicine face-to-face consult using interactive technology. The location of the patient was at [facility]. My location was [place]. I was assisted with the exam by Nurse  Doris          Electronically signed by Christopher Norton MD at 23 [phone]     Macario Elena MD at 23 185          [facility]   Teleneurology Note    Patient Name: Ana Laura Jones  : 1960  MRN: [id number]  Primary Care Physician: Annita Adames APRN  Referring Site: [place]  Location of Neurologist: [place]    Subjective   Teleneurology Initial Data           Neurologist Evaluation Date: 23 Neurologist Evaluation Time:    Date Last Known Well: 23 Time Last Known Well: 1800     History       63 yo sent in from clinic for tachycardia, was in  waiting room then started to have general weakness, maybe more on right. There is also trembling, bilateral tingling in arms and tingling in face area.   LKW around 6 pm in the triage waiting.    Stroke Risk Factors/ Pertinent Data     Stroke risk factors: none  Anticoagulants prior to arrival: none  Antiplatelets prior to arrival: none     Pre- Stroke Modified Neeses Scale     Pre-Stroke Modified Foster Scale: 0 - No Symptoms at all.    NIH Stroke Scale     NIHSS Performed Date: 02/03/23 NIHSS Performed Time: 1856   Interval: baseline  1a. Level of Consciousness: 0-->Alert, keenly responsive  1b. LOC Questions: 0-->Answers both questions correctly  1c. LOC Commands: 0-->Performs both tasks correctly  2. Best Gaze: 0-->Normal  3. Visual: 0-->No visual loss  4. Facial Palsy: 1-->Minor paralysis (flattened nasolabial fold, asymmetry on smiling)  5a. Motor Arm, Left: 0-->No drift, limb holds 90 (or 45) degrees for full 10 secs  5b. Motor Arm, Right: 0-->No drift, limb holds 90 (or 45) degrees for full 10 secs  6a. Motor Leg, Left: 0-->No drift, leg holds 30 degree position for full 5 secs  6b. Motor Leg, Right: 0-->No drift, leg holds 30 degree position for full 5 secs  7. Limb Ataxia: 0-->Absent  8. Sensory: 0-->Normal, no sensory loss  9. Best Language: 0-->No aphasia, normal  10. Dysarthria: 0-->Normal  11. Extinction and Inattention (formerly Neglect): 0-->No abnormality  Total (NIH Stroke Scale): 4     Review of Systems     Review of Systems  Neg other than above  Objective   Exam     Exam performed with the help of support staff from the referring site  Neurological Exam  Alert awake no aphasia  There is right face trembling on smile, there is no obvious droop at rest  Right arm and leg are trembling, but able to hold against gravity well.  Result Review    Results          Personal review of CNS imaging:(Official report by radiologist pending)  Imaging  CT Imaging Review: CT Imaging reviewed, NO acute infarct/  hemorrhage seen  ASPECTS Involved Locations: none  ASPECTS Score: 10    Thrombolytic   Thrombolytics: tPA not given  tPA Consent: Discussed the risk benefit and alternative treaments with patient's family and given verbal consent with witness.  Tissue Plasminogen Activator (tPA) Relative Exclusions for All Patients: Only minor non-disabling symptoms     Assessment & Plan   Assessment/ Plan     Assessment:  Acute Stroke Evaluation: Stroke not suspected/ Other (Specify)- the risks of IV thrombolytic outweigh the benefits of treatment     Likely not stroke, anxiety vs HTN vs tachycardia related  Ok to treat medically, if improved then no further neuro workup  If no improvement ok to get MRI r/o strokes    Dispo per ED if MRI negative         Disposition     Disposition: The patient will remain at the referring institution for further evaluation and management    Medical Decision Making  Medical Data Reviewed: Data reviewed including: clinical labs, radiology and/or medical tests, Independent review of CNS images  Length of visit: 20 minutes    I, Macario Elena MD, saw the patient on 02/03/23 at 1856 for an initial in-patient or emergency room telememedicine face to face consult using interactive technology for 20 minutes. The location of the patient was [place]. I was located at [place]. I was assisted with the exam by  .    I have proceeded with this evaluation at the request of the referring practitioner as it is felt to be an emergency setting and no appropriate specialist is available to perform this evaluation. The originating hospital has reported that this is the correct patient and has obtained consent from the patient/surrogate to perform this telemedicine evaluation(including obtaining history, performing examination and reviewing data provided by the patient an/or originating site of care provider)    I have introduced myself to the patient, provided my credentials, disclosed my location, and determined  that, based on review of the patient's chart and discussion with the patient's primary team, telemedicine via a HIPAA compliant, real-time, face-to-face two-way, interactive audio and video platform is an appropriate and effective means of providing the service.    The patient/surrogate has a right to refuse this evaluation as they have been explained risks including potential loss of confidentiality, benefits, alternatives, and the potential need for subsequent face-to-face care. In this evaluation, we will be providing recommendations only.  The ultimate decision to follow or not to follow these recommendations will be left to the bedside treating/requesting practitioner.    The patient/surrogate has been notified that other healthcare professionals including technical person may be involved in this A/V evaluation.  All laws concerning confidentiality and patient access to medical records and copies of medical records apply to telemedicine.  The patient/surrogate has received the originating site's Health Notice of Privacy Practices.    Macario Elena MD    Electronically signed by Macario Elena MD at 02/03/23 8617

## 2023-02-06 NOTE — PLAN OF CARE
Goal Outcome Evaluation:               Pt resting in bed. Pt had stress test today. No s/s acute distress noted. Will monitor

## 2023-02-07 VITALS
WEIGHT: 186.4 LBS | SYSTOLIC BLOOD PRESSURE: 106 MMHG | HEIGHT: 65 IN | BODY MASS INDEX: 31.06 KG/M2 | OXYGEN SATURATION: 97 % | RESPIRATION RATE: 18 BRPM | HEART RATE: 100 BPM | TEMPERATURE: 97.7 F | DIASTOLIC BLOOD PRESSURE: 66 MMHG

## 2023-02-07 LAB
ANION GAP SERPL CALCULATED.3IONS-SCNC: 8 MMOL/L (ref 5–15)
BUN SERPL-MCNC: 7 MG/DL (ref 8–23)
BUN/CREAT SERPL: 12.5 (ref 7–25)
CALCIUM SPEC-SCNC: 8.1 MG/DL (ref 8.6–10.5)
CHLORIDE SERPL-SCNC: 105 MMOL/L (ref 98–107)
CO2 SERPL-SCNC: 21 MMOL/L (ref 22–29)
CREAT SERPL-MCNC: 0.56 MG/DL (ref 0.57–1)
DEPRECATED RDW RBC AUTO: 65.5 FL (ref 37–54)
EGFRCR SERPLBLD CKD-EPI 2021: 104.6 ML/MIN/1.73
ERYTHROCYTE [DISTWIDTH] IN BLOOD BY AUTOMATED COUNT: 22.7 % (ref 12.3–15.4)
GLUCOSE SERPL-MCNC: 89 MG/DL (ref 65–99)
HCT VFR BLD AUTO: 33.3 % (ref 34–46.6)
HGB BLD-MCNC: 9.5 G/DL (ref 12–15.9)
MCH RBC QN AUTO: 23.3 PG (ref 26.6–33)
MCHC RBC AUTO-ENTMCNC: 28.5 G/DL (ref 31.5–35.7)
MCV RBC AUTO: 81.6 FL (ref 79–97)
PLATELET # BLD AUTO: 331 10*3/MM3 (ref 140–450)
PMV BLD AUTO: 8.8 FL (ref 6–12)
POTASSIUM SERPL-SCNC: 4 MMOL/L (ref 3.5–5.2)
RBC # BLD AUTO: 4.08 10*6/MM3 (ref 3.77–5.28)
SODIUM SERPL-SCNC: 134 MMOL/L (ref 136–145)
WBC NRBC COR # BLD: 6.07 10*3/MM3 (ref 3.4–10.8)

## 2023-02-07 PROCEDURE — 94799 UNLISTED PULMONARY SVC/PX: CPT

## 2023-02-07 PROCEDURE — 85027 COMPLETE CBC AUTOMATED: CPT | Performed by: STUDENT IN AN ORGANIZED HEALTH CARE EDUCATION/TRAINING PROGRAM

## 2023-02-07 PROCEDURE — 80048 BASIC METABOLIC PNL TOTAL CA: CPT | Performed by: STUDENT IN AN ORGANIZED HEALTH CARE EDUCATION/TRAINING PROGRAM

## 2023-02-07 PROCEDURE — 25010000002 HEPARIN (PORCINE) PER 1000 UNITS: Performed by: HOSPITALIST

## 2023-02-07 PROCEDURE — 94761 N-INVAS EAR/PLS OXIMETRY MLT: CPT

## 2023-02-07 PROCEDURE — 99239 HOSP IP/OBS DSCHRG MGMT >30: CPT | Performed by: STUDENT IN AN ORGANIZED HEALTH CARE EDUCATION/TRAINING PROGRAM

## 2023-02-07 RX ORDER — ASPIRIN 81 MG/1
81 TABLET, CHEWABLE ORAL DAILY
Qty: 30 TABLET | Refills: 0 | Status: SHIPPED | OUTPATIENT
Start: 2023-02-07

## 2023-02-07 RX ORDER — METOPROLOL TARTRATE 75 MG/1
75 TABLET, FILM COATED ORAL EVERY 12 HOURS SCHEDULED
Qty: 60 TABLET | Refills: 0 | Status: SHIPPED | OUTPATIENT
Start: 2023-02-07 | End: 2023-03-09

## 2023-02-07 RX ORDER — ATORVASTATIN CALCIUM 20 MG/1
20 TABLET, FILM COATED ORAL DAILY
Qty: 30 TABLET | Refills: 0 | Status: SHIPPED | OUTPATIENT
Start: 2023-02-07

## 2023-02-07 RX ORDER — LOSARTAN POTASSIUM 50 MG/1
50 TABLET ORAL
Qty: 30 TABLET | Refills: 0 | Status: SHIPPED | OUTPATIENT
Start: 2023-02-07 | End: 2023-03-09

## 2023-02-07 RX ADMIN — LOSARTAN POTASSIUM 50 MG: 50 TABLET, FILM COATED ORAL at 09:57

## 2023-02-07 RX ADMIN — PANTOPRAZOLE SODIUM 40 MG: 40 TABLET, DELAYED RELEASE ORAL at 05:41

## 2023-02-07 RX ADMIN — METOPROLOL TARTRATE 75 MG: 50 TABLET, FILM COATED ORAL at 09:57

## 2023-02-07 RX ADMIN — Medication 10 ML: at 09:58

## 2023-02-07 RX ADMIN — HEPARIN SODIUM 5000 UNITS: 5000 INJECTION INTRAVENOUS; SUBCUTANEOUS at 09:56

## 2023-02-07 NOTE — PROGRESS NOTES
LOS: 0 days     Name: Ana Laura Jones  Age/Sex: 62 y.o. female  :  1960        PCP: Annita Adames APRN    Principal Problem:    Syncope  Active Problems:    Paroxysmal SVT (supraventricular tachycardia) ([facility])      Admission Information: Ana Laura Jones is a 62 y.o. female with a past medical history significant for hypertension.  She presented to the ED on 2/3/2023 with report of syncope.  Cardiology was consulted for syncope and SVT.    Chief Complaint: Follow-up syncope, SVT, stress test.    Interval history: Patient was seen and examined.  She did report that she got very nauseated during her stress test earlier today.  She denies any further syncope.    Telemetry was reviewed patient is in sinus rhythm with frequent PVCs.  She did have bigeminal PVCs when she returned to the floor after her stress test today.    Subjective         Vital Signs  Vital Signs (last 72 hrs)        0700  02/ 0659  0700   0659  0700   0659  0700   1929   Most Recent      Temp (°F) 97.3 -  98.3    97.6 -  98.4    98.1 -  98.7    98 -  98.2     98 (36.7)  1519    Heart Rate 77 -  145    90 -  119    68 -  114    86 -  99     94  1519    Resp   18    18 -  20      18      18     18  1519    /86 -  197/109    133/84 -  174/80    137/68 -  182/90    140/82 -  165/62     140/82  1519    SpO2 (%) 95 -  99    95 -  99    97 -  99    97 -  98     98  1519        Temp:  [98 °F (36.7 °C)-98.2 °F (36.8 °C)] 98 °F (36.7 °C)  Heart Rate:  [] 94  Resp:  [18] 18  BP: (140-165)/(62-99) 140/82  Body mass index is 30.85 kg/m².    No intake or output data in the 24 hours ending 23    Vitals reviewed.   Constitutional:       Appearance: Well-developed.   Neck:      Vascular: No carotid bruit or JVD.   Pulmonary:      Effort: Pulmonary effort is normal. No respiratory distress.      Breath sounds: Normal breath sounds. No wheezing. No rales.    Cardiovascular:      Normal rate. Regular rhythm.      Comments: No lower extremity edema  Skin:     General: Skin is warm and dry.   Neurological:      Mental Status: Alert and oriented to person, place, and time.         Telemetry: Sinus 80s to 100s with frequent PVCs       Results Review:     Results from last 7 days   Lab Units 02/05/23  0132 02/04/23  0357 02/03/23  1425   WBC 10*3/mm3 5.18 5.63 6.56   HEMOGLOBIN g/dL 8.8* 8.9* 10.9*   PLATELETS 10*3/mm3 328 359 445     Results from last 7 days   Lab Units 02/06/23  0225 02/05/23  0132 02/04/23  0357 02/03/23  1425   SODIUM mmol/L 135* 136 136 141   POTASSIUM mmol/L 4.1 3.3* 4.0 3.7   CHLORIDE mmol/L 107 108* 105 106   CO2 mmol/L 18.4* 20.0* 21.7* 18.5*   BUN mg/dL 3* 3* 5* 6*   CREATININE mg/dL 0.52* 0.45* 0.46* 0.52*   CALCIUM mg/dL 7.9* 7.4* 7.5* 8.4*   GLUCOSE mg/dL 84 93 92 110*     Results from last 7 days   Lab Units 02/03/23  1942 02/03/23  1425   TROPONIN T ng/mL <0.010 <0.010       Results from last 7 days   Lab Units 02/03/23  1425   INR  0.93       I reviewed the patient's new clinical results.  I reviewed the patient's new imaging results and agree with the interpretation.  I personally viewed and interpreted the patient's EKG/Telemetry data      Medication Review:   amLODIPine, 5 mg, Oral, Q24H  heparin (porcine), 5,000 Units, Subcutaneous, Q12H  losartan, 50 mg, Oral, Q24H  metoprolol tartrate, 25 mg, Oral, Q12H  pantoprazole, 40 mg, Oral, Q AM  sodium chloride, 10 mL, Intravenous, Q12H           Assessment:  1. Recurrent syncope  2. SVT      Recommendations:  1. Patient underwent nuclear stress test today.  She did have a small size defect in the basal inferior lateral wall and no significant ischemia.  The defect likely represents soft tissue attenuation artifact versus small infarct.  Patient denies any recent chest pain.  2. We will continue to titrate metoprolol for improved heart rate and blood pressure control.  3. Patient sees   S[place], he will take over her care in the a.m.    I discussed the patients findings and my recommendations with patient and family    Electronically signed by PAULA Knight, 02/06/23, 7:38 PM EST.

## 2023-02-07 NOTE — PLAN OF CARE
Goal Outcome Evaluation:   Patient resting comfortably at this time. No acute distress reported. Denies any chest pain during the shift. Tolerating oxygen well on RA. Refuses bed alarm. HOB elevated. Call bell in reach. Will continue to follow the POC.

## 2023-02-07 NOTE — PROGRESS NOTES
"  Patient Identification:  Name:  Ana Laura Jones  Age:  62 y.o.  Sex:  female  :  1960  MRN:  [id number]  Visit Number:  [id number]  Primary care provider:  Annita Adames APRN    Reason for follow-up:  Syncope, orthostatic dizziness and tachycardia    Subjective     No recurrence of syncope.  Orthostatic dizziness has improved.  Tachycardia has improved.  Resting heart rate is in 70s and the heart rate goes up to 130 bpm on minimal exertion but not associated dizziness.  BP stable.  No obvious cause for tachycardia-inappropriate.      Medication Review:   heparin (porcine), 5,000 Units, Subcutaneous, Q12H  losartan, 50 mg, Oral, Q24H  metoprolol tartrate, 75 mg, Oral, Q12H  pantoprazole, 40 mg, Oral, Q AM  sodium chloride, 10 mL, Intravenous, Q12H          Vital Sign Min/Max for last 24 hours  Temp  Min: 97.8 °F (36.6 °C)  Max: 98.8 °F (37.1 °C)   BP  Min: 116/66  Max: 147/94   Pulse  Min: 79  Max: 99   Resp  Min: 18  Max: 19   SpO2  Min: 96 %  Max: 98 %   No data recorded   Weight  Min: 84.6 kg (186 lb 6.4 oz)  Max: 84.6 kg (186 lb 6.4 oz)     Flowsheet Rows    Flowsheet Row First Filed Value   Admission Height 165.1 cm (65\") Documented at 2023 1311   Admission Weight 77.1 kg (170 lb) Documented at 2023 1311          Objective       Physical Exam:     General-alert and oriented.  No distress  HEENT-normal  Neck-no JVD  Cardiac-normal heart sounds are heard.  Regular rhythm.  No murmurs  Lungs-clear to auscultate  Abdomen-normal  Extremity-no pedal edema  Neuro-no deficit     Telemetry:  Sinus rhythm    Heart rate Insupen position is 70 bpm, upon standing went up to 102 and upon walking few steps it went up to 130s.      Labs  Results from last 7 days   Lab Units 23  0155 23  0132 23  0357 23  1425   WBC 10*3/mm3 6.07 5.18 5.63 6.56   HEMOGLOBIN g/dL 9.5* 8.8* 8.9* 10.9*   HEMATOCRIT % 33.3* 31.1* 31.1* 35.9   PLATELETS 10*3/mm3 331 328 249 132     Results from " last 7 days   Lab Units 02/07/23  0155 02/06/23  0225 02/05/23  0132 02/04/23  0357 02/03/23  1425   SODIUM mmol/L 134* 135* 136 136 141   POTASSIUM mmol/L 4.0 4.1 3.3* 4.0 3.7   CHLORIDE mmol/L 105 107 108* 105 106   CO2 mmol/L 21.0* 18.4* 20.0* 21.7* 18.5*   BUN mg/dL 7* 3* 3* 5* 6*   CREATININE mg/dL 0.56* 0.52* 0.45* 0.46* 0.52*   CALCIUM mg/dL 8.1* 7.9* 7.4* 7.5* 8.4*   GLUCOSE mg/dL 89 84 93 92 110*     Results from last 7 days   Lab Units 02/04/23  0357 02/03/23  1425   BILIRUBIN mg/dL 0.6 0.4   ALK PHOS U/L 109 130*   AST (SGOT) U/L 105* 120*   ALT (SGPT) U/L 68* 82*     Results from last 7 days   Lab Units 02/05/23  0132 02/04/23  0357 02/03/23  1425   MAGNESIUM mg/dL 2.5* 1.8 1.9     Results from last 7 days   Lab Units 02/03/23  1425   INR  0.93     Results from last 7 days   Lab Units 02/03/23  1425   CRP mg/dL <0.30     Results from last 7 days   Lab Units 02/03/23  1942 02/03/23  1425   TROPONIN T ng/mL <0.010 <0.010     Results from last 7 days   Lab Units 02/03/23  1414   PH, ARTERIAL pH units 7.473*   PO2 ART mm Hg 86.6   PCO2, ARTERIAL mm Hg 28.2*   HCO3 ART mmol/L 20.7       Assessment      1.  Inappropriate sinus tachycardia.  Most likely due to autonomic dysfunction.  2.  Episodes of syncope- no obvious cerebral or cardiac causes.  No recurrence.  3.  Hypertension-controlled    Plan      Continue metoprolol 75 mg p.o. twice daily.  Patient is now less symptomatic and has no orthostatic dizziness or syncope.  Stable for discharge.  Plan to do a 2 weeks of Holter monitoring on current dose of metoprolol to assess the efficacy of the therapy in controlling tachycardia for which she will come to my office in 1 week when she comes for follow-up.  I discussed with  and patient understands the plan.      Eliza Peoples MD [facility]  02/07/23  09:43 EST

## 2023-02-07 NOTE — CASE MANAGEMENT/SOCIAL WORK
Case Management Discharge Note      Final Note: Patient is being discharged home on this date 2/7/23.    Provided Post Acute Provider List?: Yes  Post Acute Provider List: DME Supplier  Delivered To: Patient  Method of Delivery: Telephone    Selected Continued Care - Admitted Since 2/3/2023              Durable Medical Equipment Coordination complete. Patient indicates having no preference.    Service Provider Selected Services Address Phone Fax Patient Preferred    [facility]urable [facility] Equipment [id number] [place] [address] [phone] --              Final Discharge Disposition Code: 01 - home or self-care

## 2023-02-07 NOTE — CASE MANAGEMENT/SOCIAL WORK
Continued Stay Note   Marv     Patient Name: Ana Laura Jones  MRN: [id number]  Today's Date: 2/7/2023    Admit Date: 2/3/2023    Plan: YAYO Faxed and phone [facility] and spoke with Josefina who states they will deliver to patient's home today.   Discharge Plan     Row Name 02/07/23 0923       Plan    Plan CM Faxed and phone [facility] and spoke with Josefina who states they will deliver to patient's home today.    Patient/Family in Agreement with Plan yes    Provided Post Acute Provider List? Yes    Post Acute Provider List DME Supplier    Delivered To Patient    Method of Delivery Telephone    Row Name 02/07/23 0909       Plan    Plan CM phoned patient and she states that she would like to have a BSC if anyway possible and she has no preference in which DME company to use.  MD has been notified & CM awaiting order.    Final Discharge Disposition Code 01 - home or self-care    Final Note Patient is being discharged home on this date 2/7/23.               Discharge Codes    No documentation.               Expected Discharge Date and Time     Expected Discharge Date Expected Discharge Time    Feb 7, 2023             Val Hinojosa RN

## 2023-02-07 NOTE — DISCHARGE INSTR - APPOINTMENTS
Pt  has  an  apoitnment  with  [facility]  for jesusary  13  at 9:45  and  apointment  with  dr price  for  matthew 13  at 1:45 [facility]

## 2023-02-07 NOTE — DISCHARGE SUMMARY
[facility] HOSPITALISTS DISCHARGE SUMMARY    Patient Identification:  Name:  Ana Laura Jones  Age:  62 y.o.  Sex:  female  :  1960  MRN:  [id number]  Visit Number:  [id number]    Date of Admission: 2/3/2023  Date of Discharge:  2023     PCP: Annita Adames APRN    DISCHARGE DIAGNOSIS    Recurrent syncope at home  Supraventricular tachycardia  Transient episode of right-sided weakness  Hypertension  Lactic acidemia, resolved  Esophagitis  CONSULTS     Neurology  Cardiology  PROCEDURES PERFORMED      HOSPITAL COURSE  Patient is a 62 y.o. female presented to [facility] complaining of palpitations and elevated heart rate.  Please see the admitting history and physical for further details.      After initial evaluation in the emergency department patient was noted to have tachycardia with lightheadedness and recently with frequent syncopal episodes at home 5 in the last 3 weeks and admitted for observation.  While in the emergency department patient did have a transient episode of right-sided weakness and code stroke was called but ultimately stroke work-up including CTs and MRIs were all negative.  Patient did have some lactic acidemia at presentation as well however this quickly resolved and was felt to be likely due to the transient drop in patient's blood pressure with syncopal episode.  Overnight a patient's first night hospitalized patient was noted to develop supraventricular tachycardia that did resolve on its own and patient was initiated on beta-blockade.  Cardiology consultation was obtained and beta-blocker therapy titrated as well as sending patient for stress test which showed no evidence of reversible ischemia.  Cardiology ultimately recommended discharge home on beta-blocker therapy with follow-up in the outpatient setting for cardiac event monitoring.  Patient was able to tolerate titration of metoprolol to 75 mg p.o. twice daily is noted to be less short  of breath and tachycardia moderately controlled when getting up and exerting herself or performing activities.  Cardiology arranged for patient to follow-up with him in the office in 2 weeks and will arrange for Holter monitoring at that time as well as repeat assessment to monitor the efficacy of current metoprolol dosing.  Given cardiology's recommendations and improvement in patient's overall medical status she was felt to achieve maximal benefit of continued hospitalization was discharged home in stable medical condition.    VITAL SIGNS:  Temp:  [97.8 °F (36.6 °C)-98.8 °F (37.1 °C)] 97.8 °F (36.6 °C)  Heart Rate:  [79-99] 79  Resp:  [18-19] 18  BP: (116-147)/(66-94) 116/66  SpO2:  [96 %-98 %] 96 %  on   ;   Device (Oxygen Therapy): room air    Body mass index is 31.02 kg/m².  Wt Readings from Last 3 Encounters:   02/07/23 84.6 kg (186 lb 6.4 oz)   02/03/23 77.1 kg (170 lb)   02/03/23 77.1 kg (170 lb)       PHYSICAL EXAM:  Constitutional:  Well-developed and well-nourished.  No acute distress.      HENT:  Head:  Normocephalic and atraumatic.  Mouth:  Moist mucous membranes.    Eyes:  Conjunctivae and EOM are normal. No scleral icterus.    Cardiovascular: Regular rate and rhythm and normal heart sounds with no murmur.  Pulmonary/Chest:  No respiratory distress, no wheezes, no crackles, with normal breath sounds and good air movement.  Abdominal:  Soft.  Bowel sounds are normal.  No distension and no tenderness.   Musculoskeletal:  No edema, no tenderness, and no deformity.  No red or swollen joints anywhere.  Functional ROM intact.   Neurological:  Alert and oriented to person, place, and time.  No cranial nerve deficit.  No tongue deviation.  No facial droop.  No slurred speech. Intact Sensation throughout  Skin:  Skin is warm and dry. No rash or lesion noted. No pallor.   Peripheral vascular:  Pulses in all 4 extremities with no clubbing, no cyanosis, no edema.  Psychiatric: Appropriate mood and affect,  katharina.     DISCHARGE DISPOSITION   Stable    DISCHARGE MEDICATIONS:     Discharge Medications      New Medications      Instructions Start Date   losartan 50 MG tablet  Commonly known as: COZAAR   50 mg, Oral, Every 24 Hours Scheduled      Metoprolol Tartrate 75 MG tablet   75 mg, Oral, Every 12 Hours Scheduled         Continue These Medications      Instructions Start Date   melatonin 5 MG tablet tablet   10 mg, Oral, Nightly PRN             Diet Instructions    Regular         Activity Instructions    As tolerated         Additional Instructions for the Follow-ups that You Need to Schedule     Discharge Follow-up with PCP   As directed       Currently Documented PCP:    Annita Adames APRN    PCP Phone Number:    None     Follow Up Details: 2 week post hospital follow up         Discharge Follow-up with Specialty: Cardiology; 1 Week   As directed      Specialty: Cardiology    Follow Up: 1 Week    Follow Up Details: Dr. Peoples in 1 week per his request            Follow-up Information     Annita Adames APRN .    Specialty: Family Medicine  Contact information:  [address] [facility] [address]  [place] [address]  [phone]             Ester Hernandez APRN Follow up in 1 week(s).    Specialty: Nurse Practitioner  Why: pt  has  an  apoitment  with  ester hernandez  for  febuary  13  at  9 ;45  Contact information:  [address] [place] [address]  [phone]             Annita Adames APRN .    Specialty: Family Medicine  Why: 2 week post hospital follow up  Contact information:  [address] [facility] [address]  [place] [address]  [phone]                          TEST  RESULTS PENDING AT DISCHARGE  Pending Labs     Order Current Status    Blood Culture - Blood, Arm, Left Preliminary result    Blood Culture - Blood, Arm, Right Preliminary result           The 10-year ASCVD risk score (Rafael CURIEL, et al., 2019) is: 3%    Values used to calculate the score:      Age: 62 years      Sex:  Female      Is Non- : No      Diabetic: No      Tobacco smoker: No      Systolic Blood Pressure: 116 mmHg      Is BP treated: Yes      HDL Cholesterol: 63 mg/dL      Total Cholesterol: 177 mg/dL     CODE STATUS  Code Status and Medical Interventions:   Ordered at: 02/04/23 0052     Level Of Support Discussed With:    Patient     Code Status (Patient has no pulse and is not breathing):    CPR (Attempt to Resuscitate)     Medical Interventions (Patient has pulse or is breathing):    Full Support       Monroe Pereira DO  [facility] Hospitalist  02/07/23  09:21 EST    Please note that this discharge summary required more than 30 minutes to complete.

## 2023-02-07 NOTE — DISCHARGE PLACEMENT REQUEST
"Mallory Jones (62 y.o. Female)     Date of Birth   1960    Social Security Number       Address   [address] [facility] [address]    Home Phone   [phone]    MRN   [id number]       Islam   Mosque    Marital Status                               Admission Date   2/3/23    Admission Type   Emergency    Admitting Provider   Rodriguez Alan MD    Attending Provider   Monroe Pereira DO    Department, Room/Bed   [address], [address]/       Discharge Date       Discharge Disposition   Home or Self Care    Discharge Destination                               Attending Provider: Monroe Pereira DO    Allergies: Vancomycin    Isolation: None   Infection: None   Code Status: CPR    Ht: 165.1 cm (65\")   Wt: 84.6 kg (186 lb 6.4 oz)    Admission Cmt: None   Principal Problem: Syncope [R55]                 Active Insurance as of 2/3/2023     Primary Coverage     Payor Plan Insurance Group Employer/Plan Group    [facility] COMMUNITY PLAN [facility] COMMUNITY PLAN [facility]     Payor Plan Address Payor Plan Phone Number Payor Plan Fax Number Effective Dates    [address]   2023 - None Entered    [facility] [id number]       Subscriber Name Subscriber Birth Date Member ID       MALLORY JONES 1960 [id number]                 Emergency Contacts      (Rel.) Home Phone Work Phone Mobile Phone    [place] Littlejohn (Sister) [phone] -- --    Bernardino Jones (Spouse) [phone] -- --        [address] [address]  Dept. Phone:  [phone]  Dept. Fax:  [phone] Date Ordered: 2023         Patient:  Mallory Jones MRN:  [id number]   [address]  [facility] [address] :  1960  SSN:    Phone: [phone] Sex:  F     Weight: 84.6 kg (186 lb 6.4 oz)         Ht Readings from Last 1 Encounters:   23 165.1 cm (65\")         Commode Chair          (Order ID: [id number])    Diagnosis:  " Paroxysmal SVT (supraventricular tachycardia) (HCC) (I47.1 [ICD-10-CM] 427.0 [ICD-9-CM])  Syncope, unspecified syncope type (R55 [ICD-10-CM] 780.2 [ICD-9-CM])   Quantity:  1  Comments: Patient with inappropriate tachycardia with dizziness and recent syncopal spells at home necessitating need for commode chair.     Equipment:  Bedside Commode Chair w/Fixed Arms  Length of Need (99 Months = Lifetime): 99 Months = Lifetime        Authorizing Provider's Phone: [phone]  Authorizing Provider:Monroe ePreira DO  Authorizing Provider's NPI: [id number]  Order Entered By: Monroe Pereira DO 2023  9:20 AM     Electronically signed by: Monroe Pereira DO 2023  9:20 AM            History & Physical      Rodriguez Alan MD at 23 0154          Hospitalist History and Physical        Patient Identification  Name: Ana Laura Jones  Age/Sex: 62 y.o. female  :  1960        MRN: [id number]  Visit Number: [id number]  Admit Date: 2/3/2023   PCP: Annita Adames APRN          Chief complaint heart racing    History of Present Illness:  Patient is a 62 y.o. female with history of gastric bypass surgery and cholecystectomy who presents with complaints of tachycardia. She reports intermittent tachycardia for some time now. Over the last 3 weeks she has suffered 5 syncopal episodes, for which she presented to our ED on 23. At that time, vertigo was in the differential per ED note. UA was concerning for UTI though she had no urinary symptoms. UDS was positive for THC and patient admitted to taking THC-containing gummies. She was advised to stop taking these and sent home with a prescription for macrobid. She returned to the ED on 23 with complaints of dizziness and recurrent syncope and was found to be tachycardic in the 140s. She continued to deny urinary symptoms. UA showed persistently positive nitrite, leuk esterase and 4+ bacteria though WBC had improved from 21-30 to 3-5. Review of  urine culture from 1/17/23 showed she grew E coli which was pan-sensitive but she was placed on levaquin with thoughts that the macrobid may not be strong enough and thus could be why her tachycardia and syncope were persisting. She has completed her levaquin course and still denies any urinary symptoms, yet dizziness and tachycardia have persisted. She also has experienced more nausea and some periumbilical abdominal pain. She has not experienced any further syncopal episodes but continues to get lightheaded with standing and heart races more with standing as well. She was referred to cardiology from one of her ED visits and saw the cardiologist in the clinic for the first time on 2/3. At time of this visit, her heart rate was in the 140s again and the cardiologist referred her back to the ED for further evaluation. While in the ED waiting area, patient developed acute onset right sided weakness and neurology was consulted. Patient denies chest pain, dyspnea, or lower extremity edema. She denies any illicit drug use. She underwent CT head that showed no acute intracranial abnormality but mentioned a soft tissue lesion associated with the left anterior chrissy for which MRI was recommended. MRI brain without contrast showed an indeterminate T2/FLAIR hyperintense lesion along with anterior chrissy with associated susceptibility artifact and no appreciable enhancement, for which radiology recommended follow up study in 3-6 months to document stability or change. Patient's right sided weakness has since resolved and heart rate is currently improved to the 110s. Her SBP has been elevated since arrival, ranging form 150s to 190s. Labs revealed mild alkalosis on ABG, bicarb 18 on CMP, with elevated anion gap 16, elevated alk phos 130, ALT 82, and  (all or most have been mildly elevated since 11/13/22 but are slightly higher today). WBC, CRP and procalcitonin are normal though lactate was elevated at 2.8 and has  subsequently trended down to 2.3. Troponin is negative x2, BNP is negative, and EKG showed sinus tachycardia but no overt ST changes to suggest acute ischemia. Patient is being admitted to the telemetry unit for further workup and management.     Review of Systems  Review of Systems   Constitutional: Positive for activity change. Negative for appetite change, chills, diaphoresis, fever and unexpected weight change.   HENT: Negative for congestion, postnasal drip, rhinorrhea, sinus pressure, sinus pain and sore throat.    Eyes: Negative for photophobia, pain, discharge, redness, itching and visual disturbance.   Respiratory: Negative for cough, shortness of breath and wheezing.    Cardiovascular: Positive for palpitations. Negative for chest pain and leg swelling.   Gastrointestinal: Positive for abdominal pain and nausea. Negative for abdominal distention, constipation, diarrhea and vomiting.   Endocrine: Negative for cold intolerance, heat intolerance, polydipsia, polyphagia and polyuria.   Genitourinary: Negative for difficulty urinating, dysuria, flank pain, frequency and hematuria.   Musculoskeletal: Negative for arthralgias, back pain, joint swelling, myalgias, neck pain and neck stiffness.   Skin: Positive for color change (bruises on feet from recent syncopal events/falls). Negative for pallor, rash and wound.   Neurological: Positive for dizziness, syncope, weakness (generalized currently, with right sided weakness earlier in the ED) and light-headedness. Negative for tremors, seizures and numbness.   Hematological: Negative for adenopathy. Does not bruise/bleed easily.   Psychiatric/Behavioral: Negative for agitation, behavioral problems and confusion.       History  Past Medical History:   Diagnosis Date   • Shoulder fracture      Past Surgical History:   Procedure Laterality Date   • BLADDER REPAIR     • CHOLECYSTECTOMY     • GASTRIC BYPASS     • HYSTERECTOMY     • LASIK     • LUMBAR DISC SURGERY      L5  fusion   • ORIF HUMERUS FRACTURE Right 8/30/2020    Procedure: RIGHT SHOULDER HUMERUS PROXIMAL OPEN REDUCTION INTERNAL FIXATION WITH PLATE AND SCREW;  Surgeon: Eleazar Núñez MD;  Location: [facility];  Service: Orthopedics;  Laterality: Right;   • TOTAL SHOULDER REPLACEMENT      right      Family History   Problem Relation Age of Onset   • Stomach cancer Mother    • No Known Problems Father      Social History     Tobacco Use   • Smoking status: Never   • Smokeless tobacco: Never   Substance Use Topics   • Alcohol use: No   • Drug use: No     (Not in a hospital admission)    Allergies:  Vancomycin    Objective      Vital Signs  Temp:  [97.3 °F (36.3 °C)-98 °F (36.7 °C)] 97.3 °F (36.3 °C)  Heart Rate:  [113-145] 118  Resp:  [18] 18  BP: (164-197)/() 192/99  Body mass index is 28.29 kg/m².    Physical Exam:  Physical Exam  Constitutional:       General: She is not in acute distress.     Appearance: She is obese. She is not ill-appearing.   HENT:      Head: Normocephalic and atraumatic.      Right Ear: External ear normal.      Left Ear: External ear normal.      Nose: Nose normal.      Mouth/Throat:      Mouth: Mucous membranes are moist.      Pharynx: Oropharynx is clear.   Eyes:      Extraocular Movements: Extraocular movements intact.      Conjunctiva/sclera: Conjunctivae normal.      Pupils: Pupils are equal, round, and reactive to light.   Cardiovascular:      Rate and Rhythm: Regular rhythm. Tachycardia present.      Pulses: Normal pulses.      Heart sounds: Normal heart sounds. No murmur heard.  Pulmonary:      Effort: Pulmonary effort is normal. No respiratory distress.      Breath sounds: Normal breath sounds. No wheezing or rales.   Abdominal:      General: Abdomen is flat. Bowel sounds are normal. There is no distension.      Palpations: Abdomen is soft.      Tenderness: There is abdominal tenderness (minimal, around periumbilical region).   Musculoskeletal:         General: Normal range of  motion.      Cervical back: Normal range of motion and neck supple. No tenderness.      Right lower leg: No edema.      Left lower leg: No edema.   Lymphadenopathy:      Cervical: No cervical adenopathy.   Skin:     General: Skin is warm and dry.      Capillary Refill: Capillary refill takes less than 2 seconds.      Comments: Mild bruising around ankles   Neurological:      General: No focal deficit present.      Mental Status: She is alert and oriented to person, place, and time.   Psychiatric:         Mood and Affect: Mood normal.         Behavior: Behavior normal.         Thought Content: Thought content normal.         Judgment: Judgment normal.           Results Review:       Lab Results:  Results from last 7 days   Lab Units 02/03/23  1425   WBC 10*3/mm3 6.56   HEMOGLOBIN g/dL 10.9*   PLATELETS 10*3/mm3 445     Results from last 7 days   Lab Units 02/03/23  1425   CRP mg/dL <0.30     Results from last 7 days   Lab Units 02/03/23  1425   SODIUM mmol/L 141   POTASSIUM mmol/L 3.7   CHLORIDE mmol/L 106   CO2 mmol/L 18.5*   BUN mg/dL 6*   CREATININE mg/dL 0.52*   CALCIUM mg/dL 8.4*   GLUCOSE mg/dL 110*     Results from last 7 days   Lab Units 02/03/23  1425   MAGNESIUM mg/dL 1.9     Hemoglobin A1C   Date Value Ref Range Status   02/03/2023 4.60 (L) 4.80 - 5.60 % Final     Results from last 7 days   Lab Units 02/03/23  1425   BILIRUBIN mg/dL 0.4   ALK PHOS U/L 130*   AST (SGOT) U/L 120*   ALT (SGPT) U/L 82*     Results from last 7 days   Lab Units 02/03/23  1942 02/03/23  1425   TROPONIN T ng/mL <0.010 <0.010         Results from last 7 days   Lab Units 02/03/23  1425   INR  0.93     Results from last 7 days   Lab Units 02/03/23  1414   PH, ARTERIAL pH units 7.473*   PO2 ART mm Hg 86.6   PCO2, ARTERIAL mm Hg 28.2*   HCO3 ART mmol/L 20.7       I have reviewed the patient's laboratory results.    Imaging:  Imaging Results (Last 72 Hours)     Procedure Component Value Units Date/Time    CT Abdomen Pelvis Without  Contrast [[id number]] Collected: 02/04/23 0131     Updated: 02/04/23 0133    Narrative:      CT Abdomen Pelvis WO    INDICATION:   Abnormal elevated liver enzymes. Tachycardia.    TECHNIQUE:   CT of the abdomen and pelvis without IV contrast. Coronal and sagittal reconstructions were obtained.  Radiation dose reduction techniques included automated exposure control or exposure modulation based on body size. Count of known CT and cardiac nuc  med studies performed in previous 12 months: 5.     COMPARISON:   1/25/2023    FINDINGS:  There is chronic right base atelectasis due to a chronically elevated right hemidiaphragm. There is a hiatal hernia with thickening of the lower esophagus with adjacent fat stranding compatible with esophagitis. There is coronary artery disease. There is  atherosclerotic disease with no aortic aneurysm. Gallbladder is surgically absent. Patient is status post gastric bypass. Gas-filled small bowel loops suggesting ileus. No definite small bowel obstruction is seen. There are a few scattered colonic  diverticula, but there is no evidence of acute diverticulitis or acute colitis. The appendix is normal.    High density material in the urinary bladder is likely excreted MRI contrast from prior studies this evening. Uterus is surgically absent. There is diffuse hepatic steatosis. No liver mass is identified allowing for the lack of IV contrast. Left adrenal  nodule is unchanged in the short interval. Solid abdominal organs are otherwise normal. There is no adenopathy or free fluid. Patient is status post L5-S1 spinal fusion with laminectomy. There is an old fracture at L2.      Impression:        1. Thickening of the lower esophagus with adjacent fat stranding concerning for esophagitis.  2. Evidence of small bowel ileus. No bowel obstruction. Normal appendix.  3. Hepatic steatosis without focal liver mass.  4. Cholecystectomy, hysterectomy, and gastric bypass.      Signer Name: Bi Herrera,  MD   Signed: 2/4/2023 1:31 AM   Workstation Name: [id number]    [facility]    MRI Angiogram Neck Without Contrast [[id number]] Collected: 02/04/23 0015     Updated: 02/04/23 0017    Narrative:      MRA Neck WO    INDICATION:    Right-sided weakness    TECHNIQUE:   Axial time-of-flight MRA of the neck with 3-dimensional reformats.  Evaluation for a significant carotid arterial stenosis is based on the NASCET criteria.    COMPARISON:    None available.    FINDINGS:  Normal flow-related signal within the cervical carotid arteries and vertebral arteries without flow-limiting stenosis or occlusion.      Impression:      No flow-limiting stenosis or occlusion.    Signer Name: Feng Levi MD   Signed: 2/4/2023 12:15 AM   Workstation Name: [id number]    [facility]    MRI Brain With & Without Contrast [[id number]] Collected: 02/04/23 0014     Updated: 02/04/23 0016    Narrative:      MRI Brain WO W    CLINICAL HISTORY: Ct abdnormality in left chrissy, right side weakness;Tachycardia, unspecified;Unspecified symptoms and signs involving the nervous system.    COMPARISON: CT brain same day.    TECHNIQUE: Multiplanar, multisequence images of the brain were obtained with and without intravenous contrast.    FINDINGS:    No acute infarct, intracranial hemorrhage, mass effect, hydrocephalus, or midline shift.     0.7 x 0.5 cm T2/FLAIR hyperintense lesion along the anterior surface of the left anterior chrissy with associated susceptibility artifact. No appreciable enhancement.    Scattered foci of FLAIR hyperintensity in the cerebral white matter, nonspecific but likely representing chronic microvascular white matter changes.      Ventricles and cortical sulci are mildly prominent, in keeping with age-related volume loss.  Basal cisterns are clear. Flow voids are preserved in major intracranial vessels.     Paranasal sinuses and mastoid air cells are clear.      Impression:        1.  No  acute infarct, hemorrhage, mass effect, or hydrocephalus.  2.  0.7 x 0.5 cm indeterminate T2/FLAIR hyperintense lesion along the anterior chrissy with associated susceptibility artifact and no appreciable enhancement corresponding to abnormality seen on CT. Recommend follow-up study in 3-6 months to document  stability or change. Subsequent study can be done with MR IAC protocol with and without contrast with thin slices through the brainstem.    Signer Name: Feng Levi MD   Signed: 2/4/2023 12:14 AM   Workstation Name: [id number]    [facility]    MRI Angiogram Head Without Contrast [[id number]] Collected: 02/03/23 2326     Updated: 02/03/23 2328    Narrative:      MRA Head WO    INDICATION:    Right-sided weakness, CT abnormality in the chrissy    TECHNIQUE:   Axial time-of-flight MRA of the head with 3-dimensional reformats.    COMPARISON:    CT brain 2/3/2023.    FINDINGS:    Normal flow related signal within the intracranial carotid arteries, middle cerebral arteries, anterior cerebral arteries, intradural vertebral arteries, basilar artery, or posterior cerebral artery without significant stenosis or occlusion. No evidence  of cerebral aneurysm.    CT abnormality is not well visualized on MRA.      Impression:      1.  No flow-limiting stenosis or occlusion. No cerebral aneurysm.  2.  CT abnormality is not well evaluated on MRA.    Signer Name: Feng Levi MD   Signed: 2/3/2023 11:26 PM   Workstation Name: [id number]    [facility]    CT Head Without Contrast Stroke Protocol [[id number]] Collected: 02/03/23 1840     Updated: 02/03/23 1842    Narrative:      HISTORY:   Neurologic deficit. Acute stroke protocol. Right-sided weakness. Time of scan 6:30 PM read time 6:35 PM    TECHNIQUE:   CT head without contrast. Radiation dose reduction techniques included automated exposure control or exposure modulation based on body size. Radiation audit for number of CT and nuclear  cardiology exams performed in the last year 2    COMPARISON:   Head CT from 1/25/2023.    FINDINGS:   There is no displaced calvarial fracture. The visualized paranasal sinuses and mastoid air cells are clear. There is no evidence for acute intracranial hemorrhage or extra-axial fluid collection. The ventricles are normal in size and configuration for  age group. There is no Chiari I malformation. No acute cortical infarct is suspected. Gray-white junction is relatively well-maintained for age group. There is a focus of tissue attenuation anterior to the left side of the chrissy that is about 6 x 6 x 5 mm  dimension. Its not changed from the prior study. It could be exophytic from the chrissy and should be further evaluated with an MRI with and without contrast with attention to the posterior fossa.      Impression:      No acute intracranial abnormality is suspected, but if there is clinical concern for acute CVA, follow-up imaging is recommended.  2. There is a soft tissue lesion associated with the left anterior chrissy measuring about 6 x 6 x 5 mm dimension. It could be a parenchymal lesion exophytic from the chrissy or lesion adjacent to the chrissy. It should be further evaluated with an MRI with and  without contrast with attention to the brainstem if the patient is a candidate for MRI. This is not changed from the 1/25/2023 exam.    Signer Name: Ingris Harrison MD   Signed: 2/3/2023 6:40 PM   Workstation Name: [email or web address]    [facility]    XR Chest 1 View [[id number]] Collected: 02/03/23 1525     Updated: 02/03/23 1535    Narrative:      EXAM:    XR Chest, 1 View     EXAM DATE:    2/3/2023 2:55 PM     CLINICAL HISTORY:    sob     TECHNIQUE:    Frontal view of the chest.     COMPARISON:    01/25/2023     FINDINGS:    Lungs:  Unremarkable.  No consolidation.    Pleural space:  Unremarkable.  No pneumothorax.    Heart:  Unremarkable.  No cardiomegaly.    Mediastinum:  Unremarkable.    Bones/joints:  Stable  appearance of the right humeral neck likely from  old fracture.    Soft tissues:  Surgical clips GE junction region are noted.       Impression:        Stable chest. No acute cardiopulmonary findings.     This report was finalized on 2/3/2023 3:25 PM by Dr. Marco Govea MD.             I have personally reviewed the patient's radiologic imaging.        EKG:   Sinus tachycardia, , QTc 444  Otherwise normal ECG  When compared with ECG of 25-JAN-2023 18:22,  fusion complexes are no longer present  premature ventricular complexes are no longer present  Confirmed by Waqar Fletcher ([id number]) on 2/3/2023 4:09:17 PM    I have personally reviewed the patient's EKG.        Assessment & Plan     - Tachycardia, lightheadedness, frequent syncopal episodes with standing (5 in the last 3 weeks): admit to telemetry. Troponin negative x2. Continue to monitor on telemetry. HR improved with IV fluids, will continue maintenance fluids at this time. Check orthostatics qshift. Repeat UA to ensure clearance of UTI as recently diagnosed with UTI on subsequent ED visits and treated with macrobid followed by levaquin. Obtain UDS to look for potential contributing substance as well (prior was positive for THC). Obtain ECHO in the morning. CT head and MRI brain with indeterminate left chrissy lesion which radiology is recommending f/u MRI in 3-6 months. Consult neurology for further guidance given these findings coupled with transient right sided weakness witnessed in the ED.    - Transient right sided weakness: etiology unclear. Evaluated by neurology in the ED. This was before MRI findings were resulted. Consult neuro to re-evaluate in the morning. Monitor BP as uncontrolled hypertension could be contributing to symptoms. Low dose metoprolol added.   - Lactic acidosis: etiology unclear. No infectious source identified at this time though UA still pending as noted above. Normal WBC, CRP and procal levels would argue against underlying  infection, however. Possibly related to sustained severe tachycardia? Improving with IV fluids as is heart rate. Repeat level later this morning.    - Mild transaminitis: CT abdomen/pelvis showed fatty liver which is likely contributing as LFTs have been elevated prior to today. Recent levaquin use may have raised levels further. Acute hepatitis panel negative. Continue to monitor.   - Esophagitis, small bowel ileus seen on CT abdomen/pelvis: initiate PO protonix daily starting now. Patient reports she has been eating and drinking well in recent days. Abdomen is soft on exam with normal bowel sounds. Monitor for signs/symptoms of worsening ileus.    DVT/GI Prophylaxis: SQ heparin; PO protonix    Estimated Length of Stay <2 midnights (observation)    I discussed the patient's findings, assessment and plan with the patient, her sister at bedside, and ED provider Dr Haro.    * Patient is high risk due to tachycardia, recurrent syncope and presyncopal symptoms, transient right sided weakness, lactic acidosis    Rodriguez Alan MD  02/04/23  01:54 EST      Electronically signed by Rodriguez Alan MD at 02/04/23 0254

## 2023-02-07 NOTE — PROGRESS NOTES
[facility] HOSPITALIST PROGRESS NOTE     Patient Identification:  Name:  Ana Laura Jones  Age:  62 y.o.  Sex:  female  :  1960  MRN:  [id number]  Visit Number:  [id number]  ROOM: [address] [place] [address]     Primary Care Provider:  Annita Adames APRN    Length of stay in inpatient status:  0    Subjective     Chief Compliant:    Chief Complaint   Patient presents with   • Rapid Heart Rate   • Shortness of Breath       History of Presenting Illness: Patient seen and evaluated in follow-up for recurrent syncopal episodes at home x5.  Patient still with episodes of tachycardia but improving.  Patient today status post stress test that was negative.    Objective     Current Hospital Meds:  amLODIPine, 5 mg, Oral, Q24H  heparin (porcine), 5,000 Units, Subcutaneous, Q12H  losartan, 50 mg, Oral, Q24H  metoprolol tartrate, 25 mg, Oral, Q12H  pantoprazole, 40 mg, Oral, Q AM  sodium chloride, 10 mL, Intravenous, Q12H         ----------------------------------------------------------------------------------------------------------------------  Vital Signs:  Temp:  [98 °F (36.7 °C)-98.2 °F (36.8 °C)] 98 °F (36.7 °C)  Heart Rate:  [] 94  Resp:  [18] 18  BP: (140-165)/(62-99) 140/82  SpO2:  [97 %-98 %] 98 %  on   ;   Device (Oxygen Therapy): room air  Body mass index is 30.85 kg/m².    No intake or output data in the 24 hours ending 23   ----------------------------------------------------------------------------------------------------------------------  Physical exam:  Constitutional:  Well-developed and well-nourished.  No acute distress.      HENT:  Head:  Normocephalic and atraumatic.  Mouth:  Moist mucous membranes.    Eyes:  Conjunctivae and EOM are normal. No scleral icterus.    Cardiovascular: Tachycardic but regular rhythm and normal heart sounds with no murmur.  Pulmonary/Chest:  No respiratory distress, no wheezes, no crackles, with normal breath sounds and good air  movement.  Abdominal:  Soft.  Bowel sounds are normal.  No distension and no tenderness.   Musculoskeletal:  No edema, no tenderness, and no deformity.  No red or swollen joints anywhere.  Functional ROM intact.   Neurological:  Alert and oriented to person, place, and time.  No cranial nerve deficit.  No tongue deviation.  No facial droop.  No slurred speech. Intact Sensation throughout  Skin:  Skin is warm and dry. No rash or lesion noted. No pallor.   Peripheral vascular:  Pulses in all 4 extremities with no clubbing, no cyanosis, no edema.  Psychiatric: Appropriate mood and affect, pleasant.   ----------------------------------------------------------------------------------------------------------------------     BUN/CREAT/GLUC/ALT/AST/PRECIOUS/LIP    3/0.52/84/--/--/--/-- (02/06 0225)  LYTES - Na/K/Cl/CO2: 135*/4.1/107/18.4* (02/06 0225)        No results found for: URINECX  Blood Culture   Date Value Ref Range Status   02/03/2023 No growth at 24 hours  Preliminary   02/03/2023 No growth at 24 hours  Preliminary       I have personally looked at the labs and they are summarized above.  ----------------------------------------------------------------------------------------------------------------------  Detailed radiology reports for the last 24 hours:  No radiology results for the last day  Assessment & Plan      Recurrent syncope at home  Supraventricular tachycardia    -Continue beta-blockade, 25 mg    -Transthoracic echocardiogram shows moderate pulmonary hypertension and grade 1 diastolic dysfunction otherwise no significant findings    -Cardiology consulted and following along recommended stress test, will order for the a.m.    -Stress test today with no reversible ischemia.  Appreciate further recommendations from cardiology as patient still with tachypnea and dizziness with any exertion.    Transient episode of right-sided weakness    -Status post MRI with no evidence of acute stroke but noted T2 flair  pontine lesion that could be artifact or chronic infarct.    -Continue aspirin and statin.    Hypertension    -Continue losartan    Lactic acidemia, resolved  Esophagitis      Copied text in portions of the note has been reviewed and is accurate as of 02/06/23    VTE Prophylaxis:   Mechanical Order History:     None      Pharmalogical Order History:      Ordered     Dose Route Frequency Stop    02/04/23 0236  heparin (porcine) 5000 UNIT/ML injection 5,000 Units         5,000 Units SC Every 12 Hours Scheduled --                Disposition Home once medically stable and improved    Monroe Pereira DO  [facility] Hospitalist  02/06/23  19:16 EST

## 2023-02-08 ENCOUNTER — READMISSION MANAGEMENT (OUTPATIENT)
Dept: CALL CENTER | Facility: HOSPITAL | Age: 63
End: 2023-02-08
Payer: MEDICAID

## 2023-02-08 LAB
BACTERIA SPEC AEROBE CULT: NORMAL
BACTERIA SPEC AEROBE CULT: NORMAL

## 2023-02-13 ENCOUNTER — OFFICE VISIT (OUTPATIENT)
Dept: FAMILY MEDICINE CLINIC | Facility: CLINIC | Age: 63
End: 2023-02-13
Payer: MEDICAID

## 2023-02-13 VITALS
TEMPERATURE: 98 F | HEIGHT: 65 IN | WEIGHT: 180 LBS | HEART RATE: 139 BPM | OXYGEN SATURATION: 98 % | DIASTOLIC BLOOD PRESSURE: 80 MMHG | BODY MASS INDEX: 29.99 KG/M2 | SYSTOLIC BLOOD PRESSURE: 140 MMHG

## 2023-02-13 DIAGNOSIS — I47.1 PAROXYSMAL SVT (SUPRAVENTRICULAR TACHYCARDIA): ICD-10-CM

## 2023-02-13 DIAGNOSIS — Z12.11 SCREENING FOR COLON CANCER: ICD-10-CM

## 2023-02-13 DIAGNOSIS — Z12.31 ENCOUNTER FOR SCREENING MAMMOGRAM FOR MALIGNANT NEOPLASM OF BREAST: Primary | ICD-10-CM

## 2023-02-13 PROCEDURE — 99213 OFFICE O/P EST LOW 20 MIN: CPT | Performed by: FAMILY MEDICINE

## 2023-02-13 NOTE — PROGRESS NOTES
Transitional Care Follow Up Visit  Subjective     Ana Laura Jones is a 62 y.o. female who presents for a transitional care management visit.    Within 48 business hours after discharge our office contacted her via telephone to coordinate her care and needs.      I reviewed and discussed the details of that call along with the discharge summary, hospital problems, inpatient lab results, inpatient diagnostic studies, and consultation reports with Ana Laura.     Current outpatient and discharge medications have been reconciled for the patient.  Reviewed by: PAULA Prasad      Date of TCM Phone Call 8/31/2020   [facility]   Date of Admission 8/30/2020   Date of Discharge 8/31/2020   Discharge Disposition Home or Self Care     Risk for Readmission (LACE) Score: 11 (2/7/2023  6:00 AM)      History of Present Illness   Course During Hospital Stay:  Hospital records reviewed and discharge summary copied below   Patient is a 62 y.o. female presented to [facility] complaining of palpitations and elevated heart rate.  Please see the admitting history and physical for further details.       After initial evaluation in the emergency department patient was noted to have tachycardia with lightheadedness and recently with frequent syncopal episodes at home 5 in the last 3 weeks and admitted for observation.  While in the emergency department patient did have a transient episode of right-sided weakness and code stroke was called but ultimately stroke work-up including CTs and MRIs were all negative.  Patient did have some lactic acidemia at presentation as well however this quickly resolved and was felt to be likely due to the transient drop in patient's blood pressure with syncopal episode.  Overnight a patient's first night hospitalized patient was noted to develop supraventricular tachycardia that did resolve on its own and patient was initiated on beta-blockade.  Cardiology consultation was  obtained and beta-blocker therapy titrated as well as sending patient for stress test which showed no evidence of reversible ischemia.  Cardiology ultimately recommended discharge home on beta-blocker therapy with follow-up in the outpatient setting for cardiac event monitoring.  Patient was able to tolerate titration of metoprolol to 75 mg p.o. twice daily is noted to be less short of breath and tachycardia moderately controlled when getting up and exerting herself or performing activities.  Cardiology arranged for patient to follow-up with him in the office in 2 weeks and will arrange for Holter monitoring at that time as well as repeat assessment to monitor the efficacy of current metoprolol dosing.  Given cardiology's recommendations and improvement in patient's overall medical status she was felt to achieve maximal benefit of continued hospitalization was discharged home in stable medical condition.    Patient comes to the clinic today stating her bp has remained around 140/90 at home as well as her heart rate has remained elevated. Hear rate today is in the 130s, which she states that is what is around at home, she has a follow up with cardiology later today, advise patient to address this with them. States she is taken medication as prescribed. Denies any syncopal episodes. Follow up in 1 month for labs.      The following portions of the patient's history were reviewed and updated as appropriate: allergies, current medications, past family history, past medical history, past social history, past surgical history and problem list.    Review of Systems    Objective   Physical Exam  Constitutional:       General: She is not in acute distress.     Appearance: Normal appearance. She is well-developed and well-groomed. She is not ill-appearing, toxic-appearing or diaphoretic.   HENT:      Head: Normocephalic.      Right Ear: Tympanic membrane, ear canal and external ear normal.      Left Ear: Tympanic membrane,  ear canal and external ear normal.      Nose: Nose normal. No congestion or rhinorrhea.      Mouth/Throat:      Mouth: Mucous membranes are moist.      Pharynx: Oropharynx is clear. No oropharyngeal exudate or posterior oropharyngeal erythema.   Eyes:      General: Lids are normal.         Right eye: No discharge.         Left eye: No discharge.      Extraocular Movements: Extraocular movements intact.      Pupils: Pupils are equal, round, and reactive to light.   Neck:      Vascular: No carotid bruit.   Cardiovascular:      Rate and Rhythm: Regular rhythm. Tachycardia present.      Pulses: Normal pulses.      Heart sounds: Normal heart sounds. No murmur heard.    No friction rub. No gallop.   Pulmonary:      Effort: Pulmonary effort is normal. No respiratory distress.      Breath sounds: Normal breath sounds. No stridor. No wheezing, rhonchi or rales.   Chest:      Chest wall: No tenderness.   Abdominal:      General: Bowel sounds are normal. There is no distension.      Palpations: Abdomen is soft. There is no mass.      Tenderness: There is no abdominal tenderness. There is no right CVA tenderness, left CVA tenderness, guarding or rebound.      Hernia: No hernia is present.   Musculoskeletal:         General: No swelling or tenderness. Normal range of motion.      Cervical back: Normal range of motion and neck supple. No rigidity or tenderness.      Right lower leg: No edema.      Left lower leg: No edema.   Lymphadenopathy:      Cervical: No cervical adenopathy.   Skin:     General: Skin is warm.      Capillary Refill: Capillary refill takes less than 2 seconds.      Coloration: Skin is not jaundiced.      Findings: No bruising, erythema or rash.   Neurological:      General: No focal deficit present.      Mental Status: She is alert and oriented to person, place, and time.      Motor: Motor function is intact. No weakness.      Coordination: Coordination is intact.      Gait: Gait is intact. Gait normal.    Psychiatric:         Attention and Perception: Attention normal.         Mood and Affect: Mood normal.         Speech: Speech normal.         Behavior: Behavior normal.         Cognition and Memory: Cognition normal.         Judgment: Judgment normal.         Assessment & Plan   Diagnoses and all orders for this visit:    1. Encounter for screening mammogram for malignant neoplasm of breast (Primary)  -     Mammo Screening Bilateral With CAD; Future    2. Screening for colon cancer  -     Ambulatory Referral For Screening Colonoscopy    3. Paroxysmal SVT (supraventricular tachycardia) (HCC)  Comments:  continue with cardiology follow up today                Procedures     Return in about 4 weeks (around 3/13/2023), or if symptoms worsen or fail to improve.     This document has been electronically signed by PAULA Prasad  February 13, 2023 13:34 EST `11

## 2023-02-14 ENCOUNTER — TRANSCRIBE ORDERS (OUTPATIENT)
Dept: OTHER | Facility: OTHER | Age: 63
End: 2023-02-14
Payer: MEDICAID

## 2023-02-14 ENCOUNTER — LAB (OUTPATIENT)
Dept: LAB | Facility: HOSPITAL | Age: 63
End: 2023-02-14
Payer: MEDICAID

## 2023-02-14 DIAGNOSIS — E27.8 ABNORMALITY OF CORTISOL-BINDING GLOBULIN: ICD-10-CM

## 2023-02-14 DIAGNOSIS — I10 ESSENTIAL (PRIMARY) HYPERTENSION: ICD-10-CM

## 2023-02-14 DIAGNOSIS — K76.0 FATTY METAMORPHOSIS OF LIVER: ICD-10-CM

## 2023-02-14 DIAGNOSIS — I47.1 PAROXYSMAL SUPRAVENTRICULAR TACHYCARDIA: ICD-10-CM

## 2023-02-14 DIAGNOSIS — E78.2 MIXED HYPERLIPIDEMIA: Primary | ICD-10-CM

## 2023-02-14 DIAGNOSIS — E78.2 MIXED HYPERLIPIDEMIA: ICD-10-CM

## 2023-02-14 LAB
FERRITIN SERPL-MCNC: 63.07 NG/ML (ref 13–150)
IRON 24H UR-MRATE: 33 MCG/DL (ref 37–145)
IRON SATN MFR SERPL: 7 % (ref 20–50)
TIBC SERPL-MCNC: 493 MCG/DL (ref 298–536)
TRANSFERRIN SERPL-MCNC: 331 MG/DL (ref 200–360)

## 2023-02-14 PROCEDURE — 82746 ASSAY OF FOLIC ACID SERUM: CPT

## 2023-02-14 PROCEDURE — 83540 ASSAY OF IRON: CPT

## 2023-02-14 PROCEDURE — 36415 COLL VENOUS BLD VENIPUNCTURE: CPT

## 2023-02-14 PROCEDURE — 82728 ASSAY OF FERRITIN: CPT

## 2023-02-14 PROCEDURE — 82607 VITAMIN B-12: CPT

## 2023-02-14 PROCEDURE — 82306 VITAMIN D 25 HYDROXY: CPT

## 2023-02-14 PROCEDURE — 84466 ASSAY OF TRANSFERRIN: CPT

## 2023-02-15 ENCOUNTER — PATIENT ROUNDING (BHMG ONLY) (OUTPATIENT)
Dept: FAMILY MEDICINE CLINIC | Facility: CLINIC | Age: 63
End: 2023-02-15
Payer: MEDICAID

## 2023-02-15 LAB
25(OH)D3 SERPL-MCNC: 21 NG/ML (ref 30–100)
FOLATE SERPL-MCNC: 5.73 NG/ML (ref 4.78–24.2)
VIT B12 BLD-MCNC: 318 PG/ML (ref 211–946)

## 2023-02-16 ENCOUNTER — READMISSION MANAGEMENT (OUTPATIENT)
Dept: CALL CENTER | Facility: HOSPITAL | Age: 63
End: 2023-02-16
Payer: MEDICAID

## 2023-02-16 NOTE — OUTREACH NOTE
Medical Week 2 Survey    Flowsheet Row Responses   [facility] patient discharged from? [facility]   Does the patient have one of the following disease processes/diagnoses(primary or secondary)? Other   Week 2 attempt successful? Yes   Call start time 1433   Discharge diagnosis recurrent syncope at home, SVT, transient episode of right sided weakness   Call end time 1437   Is patient permission given to speak with other caregiver? Yes   List who call center can speak with [place] Sister    Person spoke with today (if not patient) and relationship Radu [place] Sister    Meds reviewed with patient/caregiver? Yes   Does the patient have all medications ordered at discharge? Yes   Is the patient taking all medications as directed (includes completed medication regime)? Yes   Does the patient have a primary care provider?  Yes   Does the patient have an appointment with their PCP within 7 days of discharge? Yes   Comments regarding PCP New Patient with Rona PAULA Hernandez Feb 13, 2023 10:15 AM   Has the patient kept scheduled appointments due by today? Yes   Comments Sister reports patient has also had cardiology appt and currently wearing a heart monitor   Has home health visited the patient within 72 hours of discharge? N/A   What DME was ordered? [facility]   Psychosocial issues? No   Did the patient receive a copy of their discharge instructions? Yes   Nursing interventions Reviewed instructions with patient   What is the patient's perception of their health status since discharge? Improving   Is the patient/caregiver able to teach back signs and symptoms related to disease process for when to call PCP? Yes   Is the patient/caregiver able to teach back the hierarchy of who to call/visit for symptoms/problems? PCP, Specialist, Home health nurse, Urgent Care, ED, 911 Yes   If the patient is a current smoker, are they able to teach back resources for cessation? Not a smoker   Week 2 Call  Completed? Yes   Wrap up additional comments Denies any questions or needs today.           MILADIS HAYES - Registered Nurse

## 2023-02-20 ENCOUNTER — HOSPITAL ENCOUNTER (EMERGENCY)
Facility: HOSPITAL | Age: 63
Discharge: HOME | End: 2023-02-20
Attending: STUDENT IN AN ORGANIZED HEALTH CARE EDUCATION/TRAINING PROGRAM | Admitting: STUDENT IN AN ORGANIZED HEALTH CARE EDUCATION/TRAINING PROGRAM
Payer: MEDICAID

## 2023-02-20 VITALS
TEMPERATURE: 98.6 F | WEIGHT: 180 LBS | OXYGEN SATURATION: 97 % | SYSTOLIC BLOOD PRESSURE: 110 MMHG | DIASTOLIC BLOOD PRESSURE: 77 MMHG | HEIGHT: 65 IN | RESPIRATION RATE: 18 BRPM | HEART RATE: 99 BPM | BODY MASS INDEX: 29.99 KG/M2

## 2023-02-20 DIAGNOSIS — F10.920 ALCOHOLIC INTOXICATION WITHOUT COMPLICATION: Primary | ICD-10-CM

## 2023-02-20 DIAGNOSIS — F32.A DEPRESSION, UNSPECIFIED DEPRESSION TYPE: ICD-10-CM

## 2023-02-20 LAB
ALBUMIN SERPL-MCNC: 3.4 G/DL (ref 3.5–5.2)
ALBUMIN/GLOB SERPL: 1.1 G/DL
ALP SERPL-CCNC: 109 U/L (ref 39–117)
ALT SERPL W P-5'-P-CCNC: 63 U/L (ref 1–33)
AMPHET+METHAMPHET UR QL: NEGATIVE
AMPHETAMINES UR QL: NEGATIVE
ANION GAP SERPL CALCULATED.3IONS-SCNC: 13.1 MMOL/L (ref 5–15)
ANISOCYTOSIS BLD QL: NORMAL
AST SERPL-CCNC: 84 U/L (ref 1–32)
B-HCG UR QL: NEGATIVE
BACTERIA UR QL AUTO: ABNORMAL /HPF
BARBITURATES UR QL SCN: NEGATIVE
BASOPHILS # BLD AUTO: 0.02 10*3/MM3 (ref 0–0.2)
BASOPHILS NFR BLD AUTO: 0.4 % (ref 0–1.5)
BENZODIAZ UR QL SCN: NEGATIVE
BILIRUB SERPL-MCNC: 0.2 MG/DL (ref 0–1.2)
BILIRUB UR QL STRIP: NEGATIVE
BUN SERPL-MCNC: 5 MG/DL (ref 8–23)
BUN/CREAT SERPL: 8.9 (ref 7–25)
BUPRENORPHINE SERPL-MCNC: NEGATIVE NG/ML
CALCIUM SPEC-SCNC: 8.9 MG/DL (ref 8.6–10.5)
CANNABINOIDS SERPL QL: NEGATIVE
CHLORIDE SERPL-SCNC: 106 MMOL/L (ref 98–107)
CLARITY UR: CLEAR
CO2 SERPL-SCNC: 22.9 MMOL/L (ref 22–29)
COCAINE UR QL: NEGATIVE
COLOR UR: YELLOW
CREAT SERPL-MCNC: 0.56 MG/DL (ref 0.57–1)
DACRYOCYTES BLD QL SMEAR: NORMAL
DEPRECATED RDW RBC AUTO: 63 FL (ref 37–54)
EGFRCR SERPLBLD CKD-EPI 2021: 104.6 ML/MIN/1.73
EOSINOPHIL # BLD AUTO: 0.03 10*3/MM3 (ref 0–0.4)
EOSINOPHIL NFR BLD AUTO: 0.5 % (ref 0.3–6.2)
ERYTHROCYTE [DISTWIDTH] IN BLOOD BY AUTOMATED COUNT: 22.2 % (ref 12.3–15.4)
ETHANOL BLD-MCNC: 285 MG/DL (ref 0–10)
ETHANOL UR QL: 0.28 %
FLUAV RNA RESP QL NAA+PROBE: NOT DETECTED
FLUBV RNA RESP QL NAA+PROBE: NOT DETECTED
GLOBULIN UR ELPH-MCNC: 3 GM/DL
GLUCOSE SERPL-MCNC: 105 MG/DL (ref 65–99)
GLUCOSE UR STRIP-MCNC: NEGATIVE MG/DL
HCT VFR BLD AUTO: 33.6 % (ref 34–46.6)
HGB BLD-MCNC: 10.1 G/DL (ref 12–15.9)
HGB UR QL STRIP.AUTO: ABNORMAL
HYALINE CASTS UR QL AUTO: ABNORMAL /LPF
HYPOCHROMIA BLD QL: NORMAL
IMM GRANULOCYTES # BLD AUTO: 0.03 10*3/MM3 (ref 0–0.05)
IMM GRANULOCYTES NFR BLD AUTO: 0.5 % (ref 0–0.5)
KETONES UR QL STRIP: NEGATIVE
LEUKOCYTE ESTERASE UR QL STRIP.AUTO: NEGATIVE
LYMPHOCYTES # BLD AUTO: 1.07 10*3/MM3 (ref 0.7–3.1)
LYMPHOCYTES NFR BLD AUTO: 19.5 % (ref 19.6–45.3)
MAGNESIUM SERPL-MCNC: 1.9 MG/DL (ref 1.6–2.4)
MCH RBC QN AUTO: 24 PG (ref 26.6–33)
MCHC RBC AUTO-ENTMCNC: 30.1 G/DL (ref 31.5–35.7)
MCV RBC AUTO: 79.8 FL (ref 79–97)
METHADONE UR QL SCN: NEGATIVE
MONOCYTES # BLD AUTO: 0.49 10*3/MM3 (ref 0.1–0.9)
MONOCYTES NFR BLD AUTO: 8.9 % (ref 5–12)
NEUTROPHILS NFR BLD AUTO: 3.84 10*3/MM3 (ref 1.7–7)
NEUTROPHILS NFR BLD AUTO: 70.2 % (ref 42.7–76)
NITRITE UR QL STRIP: NEGATIVE
NRBC BLD AUTO-RTO: 0 /100 WBC (ref 0–0.2)
OPIATES UR QL: NEGATIVE
OXYCODONE UR QL SCN: NEGATIVE
PCP UR QL SCN: NEGATIVE
PH UR STRIP.AUTO: 6.5 [PH] (ref 5–8)
PLAT MORPH BLD: NORMAL
PLATELET # BLD AUTO: 330 10*3/MM3 (ref 140–450)
PMV BLD AUTO: 8.6 FL (ref 6–12)
POTASSIUM SERPL-SCNC: 4.2 MMOL/L (ref 3.5–5.2)
PROPOXYPH UR QL: NEGATIVE
PROT SERPL-MCNC: 6.4 G/DL (ref 6–8.5)
PROT UR QL STRIP: NEGATIVE
RBC # BLD AUTO: 4.21 10*6/MM3 (ref 3.77–5.28)
RBC # UR STRIP: ABNORMAL /HPF
REF LAB TEST METHOD: ABNORMAL
SARS-COV-2 RNA RESP QL NAA+PROBE: NOT DETECTED
SODIUM SERPL-SCNC: 142 MMOL/L (ref 136–145)
SP GR UR STRIP: <=1.005 (ref 1–1.03)
SQUAMOUS #/AREA URNS HPF: ABNORMAL /HPF
TARGETS BLD QL SMEAR: NORMAL
TRICYCLICS UR QL SCN: NEGATIVE
UROBILINOGEN UR QL STRIP: ABNORMAL
WBC # UR STRIP: ABNORMAL /HPF
WBC NRBC COR # BLD: 5.48 10*3/MM3 (ref 3.4–10.8)

## 2023-02-20 PROCEDURE — 80053 COMPREHEN METABOLIC PANEL: CPT | Performed by: STUDENT IN AN ORGANIZED HEALTH CARE EDUCATION/TRAINING PROGRAM

## 2023-02-20 PROCEDURE — 99284 EMERGENCY DEPT VISIT MOD MDM: CPT

## 2023-02-20 PROCEDURE — 87636 SARSCOV2 & INF A&B AMP PRB: CPT | Performed by: STUDENT IN AN ORGANIZED HEALTH CARE EDUCATION/TRAINING PROGRAM

## 2023-02-20 PROCEDURE — 81025 URINE PREGNANCY TEST: CPT | Performed by: STUDENT IN AN ORGANIZED HEALTH CARE EDUCATION/TRAINING PROGRAM

## 2023-02-20 PROCEDURE — C9803 HOPD COVID-19 SPEC COLLECT: HCPCS

## 2023-02-20 PROCEDURE — 85007 BL SMEAR W/DIFF WBC COUNT: CPT | Performed by: STUDENT IN AN ORGANIZED HEALTH CARE EDUCATION/TRAINING PROGRAM

## 2023-02-20 PROCEDURE — 81001 URINALYSIS AUTO W/SCOPE: CPT | Performed by: STUDENT IN AN ORGANIZED HEALTH CARE EDUCATION/TRAINING PROGRAM

## 2023-02-20 PROCEDURE — 82077 ASSAY SPEC XCP UR&BREATH IA: CPT | Performed by: STUDENT IN AN ORGANIZED HEALTH CARE EDUCATION/TRAINING PROGRAM

## 2023-02-20 PROCEDURE — 83735 ASSAY OF MAGNESIUM: CPT | Performed by: STUDENT IN AN ORGANIZED HEALTH CARE EDUCATION/TRAINING PROGRAM

## 2023-02-20 PROCEDURE — 99283 EMERGENCY DEPT VISIT LOW MDM: CPT

## 2023-02-20 PROCEDURE — 36415 COLL VENOUS BLD VENIPUNCTURE: CPT

## 2023-02-20 PROCEDURE — 85025 COMPLETE CBC W/AUTO DIFF WBC: CPT | Performed by: STUDENT IN AN ORGANIZED HEALTH CARE EDUCATION/TRAINING PROGRAM

## 2023-02-20 PROCEDURE — 80306 DRUG TEST PRSMV INSTRMNT: CPT | Performed by: STUDENT IN AN ORGANIZED HEALTH CARE EDUCATION/TRAINING PROGRAM

## 2023-02-20 NOTE — NURSING NOTE
"Pt states she drank \"a lot\" alcohol today, but doesn't want me to tell her sister about it.  Also, is adamant her sister not leave her at this time.  Assessment will be completed once the ETOH level is back.   "

## 2023-02-20 NOTE — NURSING NOTE
Spoke with Dr. Baer, discussed assessment and labs, new orders to discharge the patient per the patient's adamant demands.  Pt states she is not staying here and waiting for her ETOH level to go down.  states he will take her home and sober her up at home and bring her back if she wants to come back for treatment.  Pt adamantly denies SI at this time. [id number]

## 2023-02-20 NOTE — NURSING NOTE
"Pt is very adamant that her ETOH level is not correct and that there is no way she drank that much alcohol.  Pt is adamant she is not staying here in the ED while her ETOH level drops to a legal level so we can do a psychiatric assessment.  Pt is adamant she is not SI/HI, states she will come back after she \"michael up\" at home.  Pt is accompanied by her  and sister who state they will take her home,  states he will be with the patient and bring her back if needed and if the patient desires.   "
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
## 2023-02-28 ENCOUNTER — READMISSION MANAGEMENT (OUTPATIENT)
Dept: CALL CENTER | Facility: HOSPITAL | Age: 63
End: 2023-02-28
Payer: MEDICAID

## 2023-02-28 NOTE — OUTREACH NOTE
Medical Week 4 Survey    Flowsheet Row Responses   [facility] patient discharged from? [facility]   Does the patient have one of the following disease processes/diagnoses(primary or secondary)? Other   Week 4 attempt successful? Yes   Call start time 1620   Call end time 1622   Discharge diagnosis recurrent syncope at home, SVT, transient episode of right sided weakness   Meds reviewed with patient/caregiver? Yes   Is the patient having any side effects they believe may be caused by any medication additions or changes? No   Is the patient taking all medications as directed (includes completed medication regime)? Yes   Has the patient kept scheduled appointments due by today? Yes   Is the patient still receiving Home Health Services? N/A   What is the patient's perception of their health status since discharge? Improving   Week 4 Call Completed? Yes   Would the patient like one additional call? No   Graduated Yes   Is the patient interested in additional calls from an ambulatory ?  NOTE:  applies to high risk patients requiring additional follow-up. No   Did the patient feel the follow up calls were helpful during their recovery period? Yes   Was the number of calls appropriate? Yes          Doris STUBBS - Registered Nurse

## 2023-03-06 ENCOUNTER — TELEPHONE (OUTPATIENT)
Dept: FAMILY MEDICINE CLINIC | Facility: CLINIC | Age: 63
End: 2023-03-06
Payer: MEDICAID

## 2023-03-06 NOTE — TELEPHONE ENCOUNTER
Attempted to contact the pt in regards to an overdue Mammogram. The pt did not answer. I left a message for the pt.    Hub to read

## 2023-07-31 ENCOUNTER — APPOINTMENT (OUTPATIENT)
Dept: MRI IMAGING | Facility: HOSPITAL | Age: 63
End: 2023-07-31
Payer: MEDICAID

## 2023-07-31 ENCOUNTER — APPOINTMENT (OUTPATIENT)
Dept: CT IMAGING | Facility: HOSPITAL | Age: 63
End: 2023-07-31
Payer: MEDICAID

## 2023-07-31 ENCOUNTER — APPOINTMENT (OUTPATIENT)
Dept: GENERAL RADIOLOGY | Facility: HOSPITAL | Age: 63
End: 2023-07-31

## 2023-07-31 ENCOUNTER — HOSPITAL ENCOUNTER (EMERGENCY)
Facility: HOSPITAL | Age: 63
Discharge: ANOTHER HOSPITAL | End: 2023-08-01
Attending: EMERGENCY MEDICINE | Admitting: EMERGENCY MEDICINE

## 2023-07-31 DIAGNOSIS — R17 JAUNDICE: ICD-10-CM

## 2023-07-31 DIAGNOSIS — R18.8 CIRRHOSIS OF LIVER WITH ASCITES, UNSPECIFIED HEPATIC CIRRHOSIS TYPE: Primary | ICD-10-CM

## 2023-07-31 DIAGNOSIS — K74.60 CIRRHOSIS OF LIVER WITH ASCITES, UNSPECIFIED HEPATIC CIRRHOSIS TYPE: Primary | ICD-10-CM

## 2023-07-31 LAB
ALBUMIN SERPL-MCNC: 2.2 G/DL (ref 3.5–5.2)
ALBUMIN/GLOB SERPL: 0.5 G/DL
ALP SERPL-CCNC: 302 U/L (ref 39–117)
ALT SERPL W P-5'-P-CCNC: 129 U/L (ref 1–33)
AMMONIA BLD-SCNC: 134 UMOL/L (ref 11–51)
AMPHET+METHAMPHET UR QL: NEGATIVE
AMPHETAMINES UR QL: NEGATIVE
ANION GAP SERPL CALCULATED.3IONS-SCNC: 19.9 MMOL/L (ref 5–15)
ANISOCYTOSIS BLD QL: ABNORMAL
APAP SERPL-MCNC: <5 MCG/ML (ref 0–30)
APTT PPP: 34.4 SECONDS (ref 26.5–34.5)
AST SERPL-CCNC: 131 U/L (ref 1–32)
BACTERIA UR QL AUTO: ABNORMAL /HPF
BARBITURATES UR QL SCN: NEGATIVE
BENZODIAZ UR QL SCN: POSITIVE
BILIRUB SERPL-MCNC: 13.7 MG/DL (ref 0–1.2)
BILIRUB UR QL STRIP: ABNORMAL
BUN SERPL-MCNC: 4 MG/DL (ref 8–23)
BUN/CREAT SERPL: 7.1 (ref 7–25)
BUPRENORPHINE SERPL-MCNC: NEGATIVE NG/ML
CALCIUM SPEC-SCNC: 6.9 MG/DL (ref 8.6–10.5)
CANNABINOIDS SERPL QL: NEGATIVE
CHLORIDE SERPL-SCNC: 85 MMOL/L (ref 98–107)
CLARITY UR: ABNORMAL
CO2 SERPL-SCNC: 19.1 MMOL/L (ref 22–29)
COCAINE UR QL: NEGATIVE
COLOR UR: ABNORMAL
CREAT SERPL-MCNC: 0.56 MG/DL (ref 0.57–1)
D-LACTATE SERPL-SCNC: 2 MMOL/L (ref 0.5–2)
D-LACTATE SERPL-SCNC: 2.6 MMOL/L (ref 0.5–2)
DEPRECATED RDW RBC AUTO: 103.6 FL (ref 37–54)
EGFRCR SERPLBLD CKD-EPI 2021: 104 ML/MIN/1.73
ERYTHROCYTE [DISTWIDTH] IN BLOOD BY AUTOMATED COUNT: 26 % (ref 12.3–15.4)
ETHANOL BLD-MCNC: <10 MG/DL (ref 0–10)
ETHANOL UR QL: <0.01 %
FENTANYL UR-MCNC: NEGATIVE NG/ML
GEN 5 2HR TROPONIN T REFLEX: 14 NG/L
GLOBULIN UR ELPH-MCNC: 4.1 GM/DL
GLUCOSE SERPL-MCNC: 94 MG/DL (ref 65–99)
GLUCOSE UR STRIP-MCNC: ABNORMAL MG/DL
HCT VFR BLD AUTO: 38.1 % (ref 34–46.6)
HGB BLD-MCNC: 11.7 G/DL (ref 12–15.9)
HGB UR QL STRIP.AUTO: ABNORMAL
HYALINE CASTS UR QL AUTO: ABNORMAL /LPF
HYPOCHROMIA BLD QL: ABNORMAL
INR PPP: 1.17 (ref 0.9–1.1)
KETONES UR QL STRIP: ABNORMAL
LARGE PLATELETS: ABNORMAL
LEUKOCYTE ESTERASE UR QL STRIP.AUTO: ABNORMAL
LIPASE SERPL-CCNC: 25 U/L (ref 13–60)
LYMPHOCYTES # BLD MANUAL: 1.58 10*3/MM3 (ref 0.7–3.1)
LYMPHOCYTES NFR BLD MANUAL: 8 % (ref 5–12)
MACROCYTES BLD QL SMEAR: ABNORMAL
MAGNESIUM SERPL-MCNC: 1.9 MG/DL (ref 1.6–2.4)
MCH RBC QN AUTO: 33.7 PG (ref 26.6–33)
MCHC RBC AUTO-ENTMCNC: 30.7 G/DL (ref 31.5–35.7)
MCV RBC AUTO: 109.8 FL (ref 79–97)
METHADONE UR QL SCN: NEGATIVE
MONOCYTES # BLD: 1.81 10*3/MM3 (ref 0.1–0.9)
NEUTROPHILS # BLD AUTO: 19.18 10*3/MM3 (ref 1.7–7)
NEUTROPHILS NFR BLD MANUAL: 74 % (ref 42.7–76)
NEUTS BAND NFR BLD MANUAL: 11 % (ref 0–5)
NEUTS VAC BLD QL SMEAR: ABNORMAL
NITRITE UR QL STRIP: POSITIVE
NRBC SPEC MANUAL: 4 /100 WBC (ref 0–0.2)
NT-PROBNP SERPL-MCNC: 148.8 PG/ML (ref 0–900)
OPIATES UR QL: NEGATIVE
OXYCODONE UR QL SCN: NEGATIVE
PCP UR QL SCN: NEGATIVE
PH UR STRIP.AUTO: 5.5 [PH] (ref 5–8)
PLATELET # BLD AUTO: 352 10*3/MM3 (ref 140–450)
PMV BLD AUTO: 9.5 FL (ref 6–12)
POTASSIUM SERPL-SCNC: 3 MMOL/L (ref 3.5–5.2)
PROCALCITONIN SERPL-MCNC: 0.81 NG/ML (ref 0–0.25)
PROPOXYPH UR QL: NEGATIVE
PROT SERPL-MCNC: 6.3 G/DL (ref 6–8.5)
PROT UR QL STRIP: ABNORMAL
PROTHROMBIN TIME: 15.5 SECONDS (ref 12.1–14.7)
RBC # BLD AUTO: 3.47 10*6/MM3 (ref 3.77–5.28)
RBC # UR STRIP: ABNORMAL /HPF
REF LAB TEST METHOD: ABNORMAL
SALICYLATES SERPL-MCNC: <0.3 MG/DL
SCAN SLIDE: NORMAL
SMALL PLATELETS BLD QL SMEAR: ADEQUATE
SODIUM SERPL-SCNC: 124 MMOL/L (ref 136–145)
SP GR UR STRIP: >=1.03 (ref 1–1.03)
SQUAMOUS #/AREA URNS HPF: ABNORMAL /HPF
TOXIC GRANULATION: ABNORMAL
TRICYCLICS UR QL SCN: NEGATIVE
TROPONIN T DELTA: -1 NG/L
TROPONIN T SERPL HS-MCNC: 15 NG/L
UROBILINOGEN UR QL STRIP: ABNORMAL
VARIANT LYMPHS NFR BLD MANUAL: 7 % (ref 19.6–45.3)
WBC # UR STRIP: ABNORMAL /HPF
WBC CASTS #/AREA URNS LPF: ABNORMAL /LPF
WBC NRBC COR # BLD: 22.57 10*3/MM3 (ref 3.4–10.8)

## 2023-07-31 PROCEDURE — 83605 ASSAY OF LACTIC ACID: CPT | Performed by: NURSE PRACTITIONER

## 2023-07-31 PROCEDURE — 93005 ELECTROCARDIOGRAM TRACING: CPT | Performed by: NURSE PRACTITIONER

## 2023-07-31 PROCEDURE — 74181 MRI ABDOMEN W/O CONTRAST: CPT | Performed by: RADIOLOGY

## 2023-07-31 PROCEDURE — 25510000001 IOPAMIDOL 61 % SOLUTION: Performed by: NURSE PRACTITIONER

## 2023-07-31 PROCEDURE — 96365 THER/PROPH/DIAG IV INF INIT: CPT

## 2023-07-31 PROCEDURE — 87040 BLOOD CULTURE FOR BACTERIA: CPT | Performed by: NURSE PRACTITIONER

## 2023-07-31 PROCEDURE — 74177 CT ABD & PELVIS W/CONTRAST: CPT | Performed by: RADIOLOGY

## 2023-07-31 PROCEDURE — 81001 URINALYSIS AUTO W/SCOPE: CPT | Performed by: NURSE PRACTITIONER

## 2023-07-31 PROCEDURE — 85060 BLOOD SMEAR INTERPRETATION: CPT | Performed by: NURSE PRACTITIONER

## 2023-07-31 PROCEDURE — 36415 COLL VENOUS BLD VENIPUNCTURE: CPT

## 2023-07-31 PROCEDURE — 80053 COMPREHEN METABOLIC PANEL: CPT | Performed by: NURSE PRACTITIONER

## 2023-07-31 PROCEDURE — 83880 ASSAY OF NATRIURETIC PEPTIDE: CPT | Performed by: NURSE PRACTITIONER

## 2023-07-31 PROCEDURE — 93010 ELECTROCARDIOGRAM REPORT: CPT | Performed by: SPECIALIST

## 2023-07-31 PROCEDURE — 80307 DRUG TEST PRSMV CHEM ANLYZR: CPT | Performed by: NURSE PRACTITIONER

## 2023-07-31 PROCEDURE — 82077 ASSAY SPEC XCP UR&BREATH IA: CPT | Performed by: NURSE PRACTITIONER

## 2023-07-31 PROCEDURE — 80143 DRUG ASSAY ACETAMINOPHEN: CPT | Performed by: NURSE PRACTITIONER

## 2023-07-31 PROCEDURE — 84484 ASSAY OF TROPONIN QUANT: CPT | Performed by: NURSE PRACTITIONER

## 2023-07-31 PROCEDURE — 70450 CT HEAD/BRAIN W/O DYE: CPT

## 2023-07-31 PROCEDURE — 82248 BILIRUBIN DIRECT: CPT | Performed by: HOSPITALIST

## 2023-07-31 PROCEDURE — 87086 URINE CULTURE/COLONY COUNT: CPT | Performed by: NURSE PRACTITIONER

## 2023-07-31 PROCEDURE — 74181 MRI ABDOMEN W/O CONTRAST: CPT

## 2023-07-31 PROCEDURE — 84145 PROCALCITONIN (PCT): CPT | Performed by: NURSE PRACTITIONER

## 2023-07-31 PROCEDURE — 99285 EMERGENCY DEPT VISIT HI MDM: CPT

## 2023-07-31 PROCEDURE — 71045 X-RAY EXAM CHEST 1 VIEW: CPT | Performed by: RADIOLOGY

## 2023-07-31 PROCEDURE — 70450 CT HEAD/BRAIN W/O DYE: CPT | Performed by: RADIOLOGY

## 2023-07-31 PROCEDURE — P9612 CATHETERIZE FOR URINE SPEC: HCPCS

## 2023-07-31 PROCEDURE — 80179 DRUG ASSAY SALICYLATE: CPT | Performed by: NURSE PRACTITIONER

## 2023-07-31 PROCEDURE — 83735 ASSAY OF MAGNESIUM: CPT | Performed by: NURSE PRACTITIONER

## 2023-07-31 PROCEDURE — 71045 X-RAY EXAM CHEST 1 VIEW: CPT

## 2023-07-31 PROCEDURE — 85025 COMPLETE CBC W/AUTO DIFF WBC: CPT | Performed by: NURSE PRACTITIONER

## 2023-07-31 PROCEDURE — 96368 THER/DIAG CONCURRENT INF: CPT

## 2023-07-31 PROCEDURE — 74177 CT ABD & PELVIS W/CONTRAST: CPT

## 2023-07-31 PROCEDURE — 82140 ASSAY OF AMMONIA: CPT | Performed by: NURSE PRACTITIONER

## 2023-07-31 PROCEDURE — 85610 PROTHROMBIN TIME: CPT | Performed by: NURSE PRACTITIONER

## 2023-07-31 PROCEDURE — 25010000002 PIPERACILLIN SOD-TAZOBACTAM PER 1 G: Performed by: NURSE PRACTITIONER

## 2023-07-31 PROCEDURE — 85730 THROMBOPLASTIN TIME PARTIAL: CPT | Performed by: NURSE PRACTITIONER

## 2023-07-31 PROCEDURE — 0 POTASSIUM CHLORIDE 10 MEQ/100ML SOLUTION: Performed by: NURSE PRACTITIONER

## 2023-07-31 PROCEDURE — 85007 BL SMEAR W/DIFF WBC COUNT: CPT | Performed by: NURSE PRACTITIONER

## 2023-07-31 PROCEDURE — 83690 ASSAY OF LIPASE: CPT | Performed by: NURSE PRACTITIONER

## 2023-07-31 RX ORDER — CHOLECALCIFEROL (VITAMIN D3) 125 MCG
5 CAPSULE ORAL ONCE
Status: DISCONTINUED | OUTPATIENT
Start: 2023-07-31 | End: 2023-07-31

## 2023-07-31 RX ORDER — POTASSIUM CHLORIDE 7.45 MG/ML
10 INJECTION INTRAVENOUS ONCE
Status: COMPLETED | OUTPATIENT
Start: 2023-07-31 | End: 2023-08-01

## 2023-07-31 RX ORDER — LACTULOSE 10 G/15ML
30 SOLUTION ORAL ONCE
Status: COMPLETED | OUTPATIENT
Start: 2023-07-31 | End: 2023-08-01

## 2023-07-31 RX ADMIN — SODIUM CHLORIDE 250 ML: 9 INJECTION, SOLUTION INTRAVENOUS at 19:32

## 2023-07-31 RX ADMIN — SODIUM CHLORIDE 500 ML: 9 INJECTION, SOLUTION INTRAVENOUS at 22:54

## 2023-07-31 RX ADMIN — POTASSIUM CHLORIDE 10 MEQ: 7.46 INJECTION, SOLUTION INTRAVENOUS at 22:54

## 2023-07-31 RX ADMIN — IOPAMIDOL 100 ML: 612 INJECTION, SOLUTION INTRAVENOUS at 20:36

## 2023-07-31 RX ADMIN — PIPERACILLIN SODIUM AND TAZOBACTAM SODIUM 3.38 G: 3; .375 INJECTION, POWDER, LYOPHILIZED, FOR SOLUTION INTRAVENOUS at 19:32

## 2023-07-31 NOTE — ED PROVIDER NOTES
Subjective   History of Present Illness  Patient is a 63-year-old female that presents to the emergency room today with complaints of generalized weakness, fall, and yellowing of the skin.  Patient reports that she has noticed yellowing of skin for about the last 5 days.  Patient reports she has been weak for the past week worse than usual.  Patient reports she has not been able to walk for the last several days.  Patient reports that typically she is able to walk with assistance but states she is unable to get up now.  Patient denies any trauma other than a fall about 3 weeks ago but she states she hit her head with no other injuries.  Patient denies any loss conscious.  Family that is present with patient states that they have also noticed she had very dark urine.  Patient denies abdominal pain, nausea, or vomiting.  Patient denies any chest pain or shortness of breath.  Patient denies any alleviating or worsening factors.      Review of Systems   Constitutional:  Positive for activity change and fatigue.   HENT: Negative.     Eyes: Negative.         Scleral icterus     Respiratory: Negative.     Cardiovascular:  Positive for leg swelling.   Gastrointestinal: Negative.    Endocrine: Negative.    Genitourinary: Negative.    Musculoskeletal: Negative.    Skin:  Positive for color change.   Allergic/Immunologic: Negative.    Neurological:  Positive for weakness.   Hematological: Negative.    Psychiatric/Behavioral: Negative.       Past Medical History:   Diagnosis Date    Hypertension     Shoulder fracture     Stroke        Allergies   Allergen Reactions    Levothyroxine Diarrhea    Vancomycin Rash       Past Surgical History:   Procedure Laterality Date    BLADDER REPAIR      CHOLECYSTECTOMY      GASTRIC BYPASS      HYSTERECTOMY      LASIK      LUMBAR DISC SURGERY      L5 fusion    ORIF HUMERUS FRACTURE Right 8/30/2020    Procedure: RIGHT SHOULDER HUMERUS PROXIMAL OPEN REDUCTION INTERNAL FIXATION WITH PLATE AND  IFTIKHAR;  Surgeon: Eleazar Núñez MD;  Location: [facility];  Service: Orthopedics;  Laterality: Right;    TOTAL SHOULDER REPLACEMENT      right        Family History   Problem Relation Age of Onset    Stomach cancer Mother     No Known Problems Father     Arthritis Sister        Social History     Socioeconomic History    Marital status:    Tobacco Use    Smoking status: Never    Smokeless tobacco: Never   Vaping Use    Vaping Use: Never used    Passive vaping exposure: Yes   Substance and Sexual Activity    Alcohol use: No    Drug use: No    Sexual activity: Defer           Objective   Physical Exam  Vitals and nursing note reviewed.   Constitutional:       Appearance: She is well-developed.   HENT:      Head: Normocephalic.      Right Ear: External ear normal.      Left Ear: External ear normal.   Eyes:      General: Scleral icterus present.      Conjunctiva/sclera: Conjunctivae normal.      Pupils: Pupils are equal, round, and reactive to light.   Cardiovascular:      Rate and Rhythm: Normal rate and regular rhythm.      Heart sounds: Normal heart sounds.   Pulmonary:      Effort: Pulmonary effort is normal.      Breath sounds: Normal breath sounds.   Abdominal:      General: Bowel sounds are normal.      Palpations: Abdomen is soft.   Musculoskeletal:         General: Normal range of motion.      Cervical back: Normal range of motion and neck supple.      Right lower leg: Edema present.      Left lower leg: Edema present.   Skin:     General: Skin is warm and dry.      Capillary Refill: Capillary refill takes less than 2 seconds.      Coloration: Skin is jaundiced.   Neurological:      Mental Status: She is alert and oriented to person, place, and time.   Psychiatric:         Behavior: Behavior normal.         Thought Content: Thought content normal.       Procedures       Results for orders placed or performed during the hospital encounter of 07/31/23   Comprehensive Metabolic Panel    Specimen: Arm,  Left; Blood   Result Value Ref Range    Glucose 94 65 - 99 mg/dL    BUN 4 (L) 8 - 23 mg/dL    Creatinine 0.56 (L) 0.57 - 1.00 mg/dL    Sodium 124 (L) 136 - 145 mmol/L    Potassium 3.0 (L) 3.5 - 5.2 mmol/L    Chloride 85 (L) 98 - 107 mmol/L    CO2 19.1 (L) 22.0 - 29.0 mmol/L    Calcium 6.9 (L) 8.6 - 10.5 mg/dL    Total Protein 6.3 6.0 - 8.5 g/dL    Albumin 2.2 (L) 3.5 - 5.2 g/dL    ALT (SGPT) 129 (H) 1 - 33 U/L    AST (SGOT) 131 (H) 1 - 32 U/L    Alkaline Phosphatase 302 (H) 39 - 117 U/L    Total Bilirubin 13.7 (H) 0.0 - 1.2 mg/dL    Globulin 4.1 gm/dL    A/G Ratio 0.5 g/dL    BUN/Creatinine Ratio 7.1 7.0 - 25.0    Anion Gap 19.9 (H) 5.0 - 15.0 mmol/L    eGFR 104.0 >60.0 mL/min/1.73   aPTT    Specimen: Blood   Result Value Ref Range    PTT 34.4 26.5 - 34.5 seconds   Protime-INR    Specimen: Blood   Result Value Ref Range    Protime 15.5 (H) 12.1 - 14.7 Seconds    INR 1.17 (H) 0.90 - 1.10   Procalcitonin    Specimen: Arm, Left; Blood   Result Value Ref Range    Procalcitonin 0.81 (H) 0.00 - 0.25 ng/mL   Urinalysis With Culture If Indicated - Straight Cath    Specimen: Straight Cath; Urine   Result Value Ref Range    Color, UA Dark Yellow (A) Yellow, Straw    Appearance, UA Turbid (A) Clear    pH, UA 5.5 5.0 - 8.0    Specific Gravity, UA >=1.030 1.005 - 1.030    Glucose,  mg/dL (Trace) (A) Negative    Ketones, UA Trace (A) Negative    Bilirubin, UA Large (3+) (A) Negative    Blood, UA Trace (A) Negative    Protein, UA 30 mg/dL (1+) (A) Negative    Leuk Esterase, UA Moderate (2+) (A) Negative    Nitrite, UA Positive (A) Negative    Urobilinogen, UA 1.0 E.U./dL 0.2 - 1.0 E.U./dL   Urine Drug Screen - Urine, Clean Catch    Specimen: Urine, Clean Catch   Result Value Ref Range    THC, Screen, Urine Negative Negative    Phencyclidine (PCP), Urine Negative Negative    Cocaine Screen, Urine Negative Negative    Methamphetamine, Ur Negative Negative    Opiate Screen Negative Negative    Amphetamine Screen, Urine Negative  Negative    Benzodiazepine Screen, Urine Positive (A) Negative    Tricyclic Antidepressants Screen Negative Negative    Methadone Screen, Urine Negative Negative    Barbiturates Screen, Urine Negative Negative    Oxycodone Screen, Urine Negative Negative    Propoxyphene Screen Negative Negative    Buprenorphine, Screen, Urine Negative Negative   Acetaminophen Level    Specimen: Arm, Left; Blood   Result Value Ref Range    Acetaminophen <5.0 0.0 - 30.0 mcg/mL   Salicylate Level    Specimen: Arm, Left; Blood   Result Value Ref Range    Salicylate <0.3 <=30.0 mg/dL   Lactic Acid, Plasma    Specimen: Arm, Left; Blood   Result Value Ref Range    Lactate 2.6 (C) 0.5 - 2.0 mmol/L   Magnesium    Specimen: Arm, Left; Blood   Result Value Ref Range    Magnesium 1.9 1.6 - 2.4 mg/dL   Ethanol    Specimen: Arm, Left; Blood   Result Value Ref Range    Ethanol <10 0 - 10 mg/dL    Ethanol % <0.010 %   Ammonia    Specimen: Arm, Left; Blood   Result Value Ref Range    Ammonia 134 (H) 11 - 51 umol/L   High Sensitivity Troponin T    Specimen: Arm, Left; Blood   Result Value Ref Range    HS Troponin T 15 (H) <10 ng/L   BNP    Specimen: Arm, Left; Blood   Result Value Ref Range    proBNP 148.8 0.0 - 900.0 pg/mL   CBC Auto Differential    Specimen: Arm, Left; Blood   Result Value Ref Range    WBC 22.57 (H) 3.40 - 10.80 10*3/mm3    RBC 3.47 (L) 3.77 - 5.28 10*6/mm3    Hemoglobin 11.7 (L) 12.0 - 15.9 g/dL    Hematocrit 38.1 34.0 - 46.6 %    .8 (H) 79.0 - 97.0 fL    MCH 33.7 (H) 26.6 - 33.0 pg    MCHC 30.7 (L) 31.5 - 35.7 g/dL    RDW 26.0 (H) 12.3 - 15.4 %    RDW-.6 (H) 37.0 - 54.0 fl    MPV 9.5 6.0 - 12.0 fL    Platelets 352 140 - 450 10*3/mm3   Scan Slide    Specimen: Arm, Left; Blood   Result Value Ref Range    Scan Slide     Manual Differential    Specimen: Arm, Left; Blood   Result Value Ref Range    Neutrophil % 74.0 42.7 - 76.0 %    Lymphocyte % 7.0 (L) 19.6 - 45.3 %    Monocyte % 8.0 5.0 - 12.0 %    Bands %  11.0 (H) 0.0  - 5.0 %    Neutrophils Absolute 19.18 (H) 1.70 - 7.00 10*3/mm3    Lymphocytes Absolute 1.58 0.70 - 3.10 10*3/mm3    Monocytes Absolute 1.81 (H) 0.10 - 0.90 10*3/mm3    nRBC 4.0 (H) 0.0 - 0.2 /100 WBC    Anisocytosis Large/3+ None Seen    Hypochromia Slight/1+ None Seen    Macrocytes Slight/1+ None Seen    Toxic Granulation Slight/1+ None Seen    Vacuolated Neutrophils Slight/1+ None Seen    Platelet Estimate Adequate Normal    Large Platelets Slight/1+ None Seen   STAT Lactic Acid, Reflex    Specimen: Arm, Left; Blood   Result Value Ref Range    Lactate 2.0 0.5 - 2.0 mmol/L   High Sensitivity Troponin T 2Hr    Specimen: Arm, Left; Blood   Result Value Ref Range    HS Troponin T 14 (H) <10 ng/L    Troponin T Delta -1 >=-4 - <+4 ng/L   Fentanyl, Urine - Urine, Clean Catch    Specimen: Urine, Clean Catch   Result Value Ref Range    Fentanyl, Urine Negative Negative   Urinalysis, Microscopic Only - Straight Cath    Specimen: Straight Cath; Urine   Result Value Ref Range    RBC, UA 3-5 (A) None Seen, 0-2 /HPF    WBC, UA 21-30 (A) None Seen, 0-2 /HPF    Bacteria, UA Trace (A) None Seen /HPF    Squamous Epithelial Cells, UA 0-2 None Seen, 0-2 /HPF    Hyaline Casts, UA 0-2 None Seen /LPF    WBC Casts 0-2 None Seen /LPF    Methodology Manual Light Microscopy    Lipase    Specimen: Arm, Left; Blood   Result Value Ref Range    Lipase 25 13 - 60 U/L   ECG 12 Lead Tachycardia   Result Value Ref Range    QT Interval 320 ms    QTC Interval 468 ms      MRI abdomen wo contrast mrcp   Final Result   Impression:   1.  Hepatomegaly, 29 cm.  Small ascites, bilateral pleural effusions and   anasarca.  No splenomegaly or appreciable mass within the limits of this   exam.   2.  Probable portal colopathy in the ascending colon.       To TALK to On Call [phone]           This report was finalized on 7/31/2023 11:15 PM by Tatum Barton MD.          CT Abdomen Pelvis With Contrast   Final Result       1.  Mild  ascites.  Diffuse hepatic steatosis.  Status post   cholecystectomy.   2. Small right-sided and trace left-sided pleural effusion.  Small   consolidative density within the posterior right lower lobe may   represent infectious/aspiration pneumonia.       This report was finalized on 7/31/2023 8:58 PM by Harsha Burk MD.          XR Chest AP   Final Result   No radiographic evidence of acute cardiac or pulmonary disease.       This report was finalized on 7/31/2023 5:54 PM by Dr. Radames Chen MD.          CT Head Without Contrast   Final Result     b   1. Cerebral atrophy   2.b no parenchymal mass, hemorrhage, or midline shift       This report was finalized on 7/31/2023 5:12 PM by Dr. Radames Chen MD.               ED Course  ED Course as of 08/01/23 0050   Mon Jul 31, 2023   1701 EKG notes sinus tachycardia.  129 bpm.  Baseline artifact noted.  No acute ST elevation.  QTc 468.    Electronically signed by Jonathan Tinsley DO, 07/31/23, 5:02 PM EDT.   [SF]   2206 Endorsed to PAULA Holland [CIRA]   2340 MRI abdomen wo contrast mrcp  IMPRESSION:  Impression:  1.  Hepatomegaly, 29 cm.  Small ascites, bilateral pleural effusions and  anasarca.  No splenomegaly or appreciable mass within the limits of this  exam.  2.  Probable portal colopathy in the ascending colon.   [MB]   Tue Aug 01, 2023   0010 MELD Score 27. [MB]   0045 Case discussed with Dr. Dumas at [facility] MD. He accepted the patient to [facility] ER.  [MB]      ED Course User Index  [CIRA] Fercho Sy APRN  [MB] Aggie Fonseca APRN  [SF] Jonathan Tinsley DO                              Electronically signed by PAULA Mcneal, 07/31/23, 10:23 PM EDT.               Medical Decision Making  Problems Addressed:  Cirrhosis of liver with ascites, unspecified hepatic cirrhosis type: complicated acute illness or injury  Jaundice: complicated acute illness or injury    Amount and/or Complexity of Data Reviewed  Labs: ordered.  Radiology:  ordered. Decision-making details documented in ED Course.  ECG/medicine tests: ordered.    Risk  Prescription drug management.        Final diagnoses:   Cirrhosis of liver with ascites, unspecified hepatic cirrhosis type   Jaundice       ED Disposition  ED Disposition       ED Disposition   Transfer to Another Facility     Condition   --    Comment   --               No follow-up provider specified.       Medication List      No changes were made to your prescriptions during this visit.            Aggie Fonseca, APRN  08/01/23 0050
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
## 2023-08-01 VITALS
SYSTOLIC BLOOD PRESSURE: 117 MMHG | OXYGEN SATURATION: 100 % | BODY MASS INDEX: 28.32 KG/M2 | WEIGHT: 170 LBS | DIASTOLIC BLOOD PRESSURE: 77 MMHG | RESPIRATION RATE: 19 BRPM | HEIGHT: 65 IN | TEMPERATURE: 97.9 F | HEART RATE: 106 BPM

## 2023-08-01 LAB
BACTERIA SPEC AEROBE CULT: NO GROWTH
BILIRUB CONJ SERPL-MCNC: >10 MG/DL (ref 0–0.3)
CYTOLOGIST CVX/VAG CYTO: NORMAL
PATH INTERP BLD-IMP: NORMAL

## 2023-08-01 RX ADMIN — LACTULOSE 30 G: 20 SOLUTION ORAL at 01:20

## 2023-08-01 NOTE — ED NOTES
Called house supervisor Portia for transport to [facility]. She approved run, and will let the crew know.

## 2023-08-01 NOTE — ED NOTES
[facility] here to transport patient to [facility]. Patient is clean and dry at time of departure from ED. There is no acute distress that has been noted at this time. No new complaints reported by patient at this time. Family has been updated and made aware of the plans to transport patient to a higher level of care.

## 2023-08-01 NOTE — ED NOTES
Patient reports to primary nurse that she would like something for sleep and normally takes Melatonin at home. Dr. Escoto made aware, orders provided.

## 2023-08-01 NOTE — ED NOTES
Called [facility] spoke with Adela for transfer. She will call us back with the transfer Doctor shortly.

## 2023-08-03 LAB
QT INTERVAL: 320 MS
QTC INTERVAL: 468 MS

## 2023-08-05 LAB
BACTERIA SPEC AEROBE CULT: NORMAL
BACTERIA SPEC AEROBE CULT: NORMAL

## 2024-02-13 NOTE — OUTREACH NOTE
Prep Survey      Responses   [facility] patient discharged from?  [facility]   Is LACE score < 7 ?  Yes   Eligibility  [facility]   Date of Admission  08/30/20   Date of Discharge  08/31/20   Discharge Disposition  Home or Self Care   Discharge diagnosis  S/P fall w/ rt shoulder injury,  s/p right shoulder proximal humerus orif with plate and screws    COVID-19 Test Status  Negative   Does the patient have one of the following disease processes/diagnoses(primary or secondary)?  General Surgery   Does the patient have Home health ordered?  No   Is there a DME ordered?  No   Prep survey completed?  Yes          Amrita Judd RN        
Hide Additional Notes?: No
Detail Level: Zone

## (undated) DEVICE — BIT DRL QC DIA 2.5X110MM

## (undated) DEVICE — DRSNG PAD ABD 8X10IN STRL

## (undated) DEVICE — KWIRE THRD TROC/TP 1.6X5X150MM NS
Type: IMPLANTABLE DEVICE | Site: HUMERUS | Status: NON-FUNCTIONAL
Removed: 2020-08-30

## (undated) DEVICE — DRSNG WND GZ CURAD OIL EMULSION 3X8IN LF STRL 1PK

## (undated) DEVICE — DRP C/ARM W/BAND W/CLIPS 41X74IN

## (undated) DEVICE — PROXIMATE RH ROTATING HEAD SKIN STAPLERS (35 WIDE) CONTAINS 35 STAINLESS STEEL STAPLES: Brand: PROXIMATE

## (undated) DEVICE — GLV SURG SENSICARE PI ORTHO SZ8 LF STRL

## (undated) DEVICE — SCRW LK S/TAP STRDRV 3.5X40MM
Type: IMPLANTABLE DEVICE | Site: HUMERUS | Status: NON-FUNCTIONAL
Removed: 2020-08-30

## (undated) DEVICE — BNDG ELAS CO-FLEX SLF ADHR 4IN5YD LF STRL

## (undated) DEVICE — HOLDER: Brand: DEROYAL

## (undated) DEVICE — SCRW CANC LCP PT SS 4X40MM
Type: IMPLANTABLE DEVICE | Site: HUMERUS | Status: NON-FUNCTIONAL
Removed: 2020-08-30

## (undated) DEVICE — BNDG ELAS ELITE V/CLOSE 6IN 5YD LF STRL

## (undated) DEVICE — PK SHLDR 70

## (undated) DEVICE — SPNG GZ WOVN 4X4IN 12PLY 10/BX STRL

## (undated) DEVICE — CUSTOM PACK: Brand: UNBRANDED

## (undated) DEVICE — 3M™ IOBAN™ 2 ANTIMICROBIAL INCISE DRAPE 6650EZ: Brand: IOBAN™ 2

## (undated) DEVICE — SCRW CORT S/TAP 3.5X34MM
Type: IMPLANTABLE DEVICE | Site: HUMERUS | Status: NON-FUNCTIONAL
Removed: 2020-08-30

## (undated) DEVICE — BIT DRL PERC QC CALIB 2.8X200MM